# Patient Record
Sex: MALE | Race: WHITE | Employment: FULL TIME | URBAN - METROPOLITAN AREA
[De-identification: names, ages, dates, MRNs, and addresses within clinical notes are randomized per-mention and may not be internally consistent; named-entity substitution may affect disease eponyms.]

---

## 2017-12-15 ENCOUNTER — HOSPITAL ENCOUNTER (EMERGENCY)
Facility: CLINIC | Age: 52
Discharge: HOME | End: 2017-12-15
Attending: EMERGENCY MEDICINE | Admitting: EMERGENCY MEDICINE
Payer: COMMERCIAL

## 2017-12-15 ENCOUNTER — APPOINTMENT (OUTPATIENT)
Dept: GENERAL RADIOLOGY | Facility: CLINIC | Age: 52
End: 2017-12-15
Attending: EMERGENCY MEDICINE
Payer: COMMERCIAL

## 2017-12-15 VITALS
HEIGHT: 67 IN | SYSTOLIC BLOOD PRESSURE: 123 MMHG | OXYGEN SATURATION: 94 % | WEIGHT: 236 LBS | HEART RATE: 94 BPM | TEMPERATURE: 97.1 F | RESPIRATION RATE: 20 BRPM | BODY MASS INDEX: 37.04 KG/M2 | DIASTOLIC BLOOD PRESSURE: 73 MMHG

## 2017-12-15 DIAGNOSIS — R07.9 CHEST PAIN, UNSPECIFIED TYPE: ICD-10-CM

## 2017-12-15 LAB
ANION GAP SERPL CALCULATED.3IONS-SCNC: 7 MMOL/L (ref 3–14)
BASOPHILS # BLD AUTO: 0.1 10E9/L (ref 0–0.2)
BASOPHILS NFR BLD AUTO: 0.6 %
BUN SERPL-MCNC: 19 MG/DL (ref 7–30)
CALCIUM SERPL-MCNC: 8.5 MG/DL (ref 8.5–10.1)
CHLORIDE SERPL-SCNC: 105 MMOL/L (ref 94–109)
CO2 SERPL-SCNC: 27 MMOL/L (ref 20–32)
CREAT SERPL-MCNC: 1.92 MG/DL (ref 0.66–1.25)
DIFFERENTIAL METHOD BLD: NORMAL
EOSINOPHIL # BLD AUTO: 0.5 10E9/L (ref 0–0.7)
EOSINOPHIL NFR BLD AUTO: 4.5 %
ERYTHROCYTE [DISTWIDTH] IN BLOOD BY AUTOMATED COUNT: 13.2 % (ref 10–15)
GFR SERPL CREATININE-BSD FRML MDRD: 37 ML/MIN/1.7M2
GLUCOSE SERPL-MCNC: 111 MG/DL (ref 70–99)
HCT VFR BLD AUTO: 44.2 % (ref 40–53)
HGB BLD-MCNC: 14.9 G/DL (ref 13.3–17.7)
IMM GRANULOCYTES # BLD: 0 10E9/L (ref 0–0.4)
IMM GRANULOCYTES NFR BLD: 0.2 %
INTERPRETATION ECG - MUSE: NORMAL
LYMPHOCYTES # BLD AUTO: 2.5 10E9/L (ref 0.8–5.3)
LYMPHOCYTES NFR BLD AUTO: 24.3 %
MCH RBC QN AUTO: 30.8 PG (ref 26.5–33)
MCHC RBC AUTO-ENTMCNC: 33.7 G/DL (ref 31.5–36.5)
MCV RBC AUTO: 92 FL (ref 78–100)
MONOCYTES # BLD AUTO: 1.1 10E9/L (ref 0–1.3)
MONOCYTES NFR BLD AUTO: 10.5 %
NEUTROPHILS # BLD AUTO: 6 10E9/L (ref 1.6–8.3)
NEUTROPHILS NFR BLD AUTO: 59.9 %
PLATELET # BLD AUTO: 250 10E9/L (ref 150–450)
POTASSIUM SERPL-SCNC: 4.2 MMOL/L (ref 3.4–5.3)
RBC # BLD AUTO: 4.83 10E12/L (ref 4.4–5.9)
SODIUM SERPL-SCNC: 139 MMOL/L (ref 133–144)
TROPONIN I SERPL-MCNC: <0.015 UG/L (ref 0–0.04)
WBC # BLD AUTO: 10.1 10E9/L (ref 4–11)

## 2017-12-15 PROCEDURE — 71020 XR CHEST 2 VW: CPT

## 2017-12-15 PROCEDURE — 80048 BASIC METABOLIC PNL TOTAL CA: CPT | Performed by: EMERGENCY MEDICINE

## 2017-12-15 PROCEDURE — 93005 ELECTROCARDIOGRAM TRACING: CPT

## 2017-12-15 PROCEDURE — 84484 ASSAY OF TROPONIN QUANT: CPT | Performed by: EMERGENCY MEDICINE

## 2017-12-15 PROCEDURE — 85025 COMPLETE CBC W/AUTO DIFF WBC: CPT | Performed by: EMERGENCY MEDICINE

## 2017-12-15 PROCEDURE — 99285 EMERGENCY DEPT VISIT HI MDM: CPT | Mod: 25

## 2017-12-15 ASSESSMENT — ENCOUNTER SYMPTOMS
VOMITING: 0
NAUSEA: 0
COUGH: 0
SHORTNESS OF BREATH: 0
ARTHRALGIAS: 1
DIAPHORESIS: 0
LIGHT-HEADEDNESS: 0

## 2017-12-15 NOTE — ED AVS SNAPSHOT
Emergency Department    [address] [facility] [address]    Phone:  [phone]    Fax:  [phone]                                       Grayson Nam   MRN: [id number]    Department:   Emergency Department   Date of Visit:  12/15/2017           After Visit Summary Signature Page     I have received my discharge instructions, and my questions have been answered. I have discussed any challenges I see with this plan with the nurse or doctor.    ..........................................................................................................................................  Patient/Patient Representative Signature      ..........................................................................................................................................  Patient Representative Print Name and Relationship to Patient    ..................................................               ................................................  Date                                            Time    ..........................................................................................................................................  Reviewed by Signature/Title    ...................................................              ..............................................  Date                                                            Time

## 2017-12-15 NOTE — ED PROVIDER NOTES
History     Chief Complaint:  Chest Pain     HPI   Grayson Nam is a 52 year old male, with a history of pleurisy, hypertension, hyperlipidemia, and family history of cardiac disease, who presents with his wife to the ED for evaluation of left upper chest pain. The patient reports his waxing and waning chest pain began at 8:00PM yesterday while he was sitting to finish his work at home. The patient notes the pain is a dull sensation. The patient rates the pain as a 3/10. The patient reports he has left elbow pain that began a few days ago; his elbow pain now radiates into his hand. The patient denies any diaphoresis, lightheadedness, cough, shortness of breath, nausea, vomiting, or leg swelling.     CARDIAC RISK FACTORS:  Sex:    Male  Tobacco:   No  Hypertension:   Yes  Hyperlipidemia:  Yes  Diabetes:   No  Family History:  Yes    PE/DVT RISK FACTORS:  Sex:    Male  Hormones:   No  Tobacco:   No  Cancer:   No  Travel:   No  Surgery:   No  Other immobilization: No  Personal history:  No  Family history:  No    Allergies:  Atorvastatin      Medications:    PRAVASTATIN SODIUM PO   lisinopril (PRINIVIL,ZESTRIL) 5 MG tablet   Multiple Vitamin (MULTI-VITAMIN) per tablet   Omega-3 Fatty Acids (FISH OIL EXTRA STRENGTH PO)   Cholecalciferol (VITAMIN D PO)      Past Medical History:    Pleurisy   HTN  HLD    Past Surgical History:    Appendectomy     Family History:    MI    Social History:  Smoking status: Never smoker  Alcohol use: Occasional   Presents to ED with wife   Marital Status:   [2]     Review of Systems   Constitutional: Negative for diaphoresis.   Respiratory: Negative for cough and shortness of breath.    Cardiovascular: Positive for chest pain. Negative for leg swelling.   Gastrointestinal: Negative for nausea and vomiting.   Musculoskeletal: Positive for arthralgias.   Neurological: Negative for light-headedness.   All other systems reviewed and are negative.    Physical Exam     Patient  "Vitals for the past 24 hrs:   BP Temp Temp src Pulse Heart Rate Resp SpO2 Height Weight   12/15/17 0045 131/71 - - - 94 20 95 % - -   12/15/17 0026 127/78 97.1  F (36.2  C) Temporal 94 - 16 96 % 1.702 m (5' 7\") 107 kg (236 lb)     Physical Exam  Eye:  Pupils are equal, round, and reactive.  Extraocular movements intact.    ENT:  No rhinorrhea.  Moist mucus membranes.  Normal tongue and tonsil.    Cardiac:  Regular rate and rhythm.  No murmurs, gallops, or rubs.    Pulmonary:  Clear to auscultation bilaterally.  No wheezes, rales, or rhonchi.    Abdomen:  Positive bowel sounds.  Abdomen is soft and non-distended, without focal tenderness.    Musculoskeletal: No lower extremity edema or asymmetry. Normal movement of all extremities without evidence for deficit.    Skin:  Warm and dry without rashes.    Neurologic:  Non-focal exam without asymmetric weakness or numbness.     Psychiatric:  Normal affect with appropriate interaction with examiner.    Emergency Department Course     ECG (0:24:54):  Rate 95 bpm. AR interval 178. QRS duration 72. QT/QTc 334/419. P-R-T axes 54 16 55. Normal sinus rhythm. Low voltage QRS. Cannot rule out anterior infarct, age undetermined. Abnormal ECG. Interpreted at 0030 by Trierweiler, Chad A, MD.    Imaging:  Radiographic findings were communicated with the patient and family who voiced understanding of the findings.    XR Chest 2 Views  IMPRESSION: No infiltrates or other acute findings. Normal-sized  cardiac silhouette. No change. As ready by Radiology.    Laboratory:  Troponin I: <0.015  CBC: o/w WNL (WBC 10.1, HGB 14.9, )  BMP: Glucose 111(H), GFR estimate 37(L), Creatinine 1.92(H)      Emergency Department Course:  Past medical records, nursing notes, and vitals reviewed.  0037: I performed an exam of the patient and obtained history, as documented above.  IV inserted and blood drawn.    The patient was sent for a chest x-ray while in the emergency department, findings " above.    0155: I rechecked the patient. Findings and plan explained to the Patient and spouse. Patient discharged home with instructions regarding supportive care, medications, and reasons to return. The importance of close follow-up was reviewed.     Impression & Plan      Medical Decision Making:  This delightful 52-year-old gentleman with limited cardiac risk factors presents to us with foreign half hours of left sided upper chest wall discomfort.  I am able to reproduce it on exam.  He has no pulmonary embolism risk factors, and without associated pleuritic pain, shortness of breath, tachycardia, or hypoxia, I do not feel that he would require further workup for a pulmonary embolism.  Chest x-ray is clear.  EKG is normal.  Troponin is negative after 5 hours of symptoms.  With this, I feel he has essentially been ruled out for acute coronary syndrome.  He describes working out regularly, doing intense cardiovascular exercise and has not been having increasing discomfort or shortness of breath.  He has a low HEART score and would not require stress testing at this time.  The patient notes his pain is completely resolved during my final reassessment.  At this juncture, I feel he is safe for discharge home with outpatient follow-up and return cautioned for return of symptoms or other emergent concerns.     Diagnosis:    ICD-10-CM   1. Chest pain, unspecified type R07.9     Disposition: Patient discharged to home with wife     Jenny Mcallister  12/15/2017    EMERGENCY DEPARTMENT    I, Jennyharika Mcallister, am serving as a scribe at 12:37 AM on 12/15/2017 to document services personally performed by Trierweiler, Chad A, MD based on my observations and the provider's statements to me.        Trierweiler, Chad A, MD  12/15/17 0437

## 2017-12-15 NOTE — ED AVS SNAPSHOT
Emergency Department    [address] [facility] [address]    Phone:  [phone]    Fax:  [phone]                                       Grayson Nam   MRN: [id number]    Department:   Emergency Department   Date of Visit:  12/15/2017           Patient Information     Date Of Birth          1965        Your diagnoses for this visit were:     Chest pain, unspecified type        You were seen by Trierweiler, Chad A, MD.      Follow-up Information     Follow up with Aayush Bojorquez MD In 3 days.    Specialty:  Family Practice    Contact information:    [facility]  [address]  [place] [address] [phone]          Follow up with  Emergency Department.    Specialty:  EMERGENCY MEDICINE    Why:  If symptoms worsen    Contact information:    [address] [facility] [id number] [phone]        Discharge Instructions       Discharge Instructions  Chest Pain    You have been seen today for chest pain or discomfort.  At this time, your provider has found no signs that your chest pain is due to a serious or life-threatening condition, (or you have declined more testing and/or admission to the hospital). However, sometimes there is a serious problem that does not show up right away. Your evaluation today may not be complete and you may need further testing and evaluation.     Generally, every Emergency Department visit should have a follow-up clinic visit with either a primary or a specialty clinic/provider. Please follow-up as instructed by your emergency provider today.  Return to the Emergency Department if:    Your chest pain changes, gets worse, starts to happen more often, or comes with less activity.    You are newly short of breath.    You get very weak or tired.    You pass out or faint.    You have any new symptoms, like fever, cough, numb legs, or you cough up blood.    You have anything else that worries you.    Until you follow-up  with your regular provider, please do the following:    Take one aspirin daily unless you have an allergy or are told not to by your provider.    If a stress test appointment has been made, go to the appointment.    If you have questions, contact your regular provider.    Follow-up with your regular provider/clinic as directed; this is very important.    If you were given a prescription for medicine here today, be sure to read all of the information (including the package insert) that comes with your prescription.  This will include important information about the medicine, its side effects, and any warnings that you need to know about.  The pharmacist who fills the prescription can provide more information and answer questions you may have about the medicine.  If you have questions or concerns that the pharmacist cannot address, please call or return to the Emergency Department.       Remember that you can always come back to the Emergency Department if you are not able to see your regular provider in the amount of time listed above, if you get any new symptoms, or if there is anything that worries you.      24 Hour Appointment Hotline       To make an appointment at any [facility], call [phone] ([phone]). If you don't have a family doctor or clinic, we will help you find one. [facility] clinics are conveniently located to serve the needs of you and your family.             Review of your medicines      Our records show that you are taking the medicines listed below. If these are incorrect, please call your family doctor or clinic.        Dose / Directions Last dose taken    FISH OIL EXTRA STRENGTH PO        Take  by mouth daily.   Refills:  0        HYDROcodone-acetaminophen 7.5-500 MG/15ML solution   Commonly known as:  LORTAB   Dose:  10 mL   Quantity:  200 mL        Take 10 mLs by mouth 4 times daily as needed for pain.   Refills:  0        lisinopril 5 MG tablet   Commonly known as:   PRINIVIL/ZESTRIL   Dose:  5 mg        Take 5 mg by mouth daily.   Refills:  0        Multi-vitamin Tabs tablet   Dose:  1 tablet   Generic drug:  multivitamin, therapeutic with minerals        Take 1 tablet by mouth daily.   Refills:  0        PRAVASTATIN SODIUM PO   Dose:  20 mg        Take 20 mg by mouth.   Refills:  0        VITAMIN D PO        Take  by mouth daily.   Refills:  0                Procedures and tests performed during your visit     Basic metabolic panel    CBC with platelets differential    EKG 12 lead    Peripheral IV: Standard    Troponin I    XR Chest 2 Views      Orders Needing Specimen Collection     None      Pending Results     No orders found from 12/13/2017 to 12/16/2017.            Pending Culture Results     No orders found from 12/13/2017 to 12/16/2017.            Pending Results Instructions     If you had any lab results that were not finalized at the time of your Discharge, you can call the ED Lab Result RN at [phone]. You will be contacted by this team for any positive Lab results or changes in treatment. The nurses are available 7 days a week from 10A to 6:30P.  You can leave a message 24 hours per day and they will return your call.        Test Results From Your Hospital Stay        12/15/2017 12:54 AM      Component Results     Component Value Ref Range & Units Status    WBC 10.1 4.0 - 11.0 10e9/L Final    RBC Count 4.83 4.4 - 5.9 10e12/L Final    Hemoglobin 14.9 13.3 - 17.7 g/dL Final    Hematocrit 44.2 40.0 - 53.0 % Final    MCV 92 78 - 100 fl Final    MCH 30.8 26.5 - 33.0 pg Final    MCHC 33.7 31.5 - 36.5 g/dL Final    RDW 13.2 10.0 - 15.0 % Final    Platelet Count 250 150 - 450 10e9/L Final    Diff Method Automated Method  Final    % Neutrophils 59.9 % Final    % Lymphocytes 24.3 % Final    % Monocytes 10.5 % Final    % Eosinophils 4.5 % Final    % Basophils 0.6 % Final    % Immature Granulocytes 0.2 % Final    Absolute Neutrophil 6.0 1.6 - 8.3 10e9/L Final    Absolute  Lymphocytes 2.5 0.8 - 5.3 10e9/L Final    Absolute Monocytes 1.1 0.0 - 1.3 10e9/L Final    Absolute Eosinophils 0.5 0.0 - 0.7 10e9/L Final    Absolute Basophils 0.1 0.0 - 0.2 10e9/L Final    Abs Immature Granulocytes 0.0 0 - 0.4 10e9/L Final         12/15/2017  1:11 AM      Component Results     Component Value Ref Range & Units Status    Sodium 139 133 - 144 mmol/L Final    Potassium 4.2 3.4 - 5.3 mmol/L Final    Chloride 105 94 - 109 mmol/L Final    Carbon Dioxide 27 20 - 32 mmol/L Final    Anion Gap 7 3 - 14 mmol/L Final    Glucose 111 (H) 70 - 99 mg/dL Final    Urea Nitrogen 19 7 - 30 mg/dL Final    Creatinine 1.92 (H) 0.66 - 1.25 mg/dL Final    GFR Estimate 37 (L) >60 mL/min/1.7m2 Final    Non  GFR Calc    GFR Estimate If Black 44 (L) >60 mL/min/1.7m2 Final    African American GFR Calc    Calcium 8.5 8.5 - 10.1 mg/dL Final         12/15/2017  1:15 AM      Component Results     Component Value Ref Range & Units Status    Troponin I ES <0.015 0.000 - 0.045 ug/L Final    The 99th percentile for upper reference range is 0.045 ug/L.  Troponin values   in the range of 0.045 - 0.120 ug/L may be associated with risks of adverse   clinical events.           12/15/2017  1:05 AM      Narrative     XR CHEST 2 VW   12/15/2017 12:55 AM     INDICATION: Chest pain.    COMPARISON: 5/23/2013.        Impression     IMPRESSION: No infiltrates or other acute findings. Normal-sized  cardiac silhouette. No change.     ERIC KEYS MD                Clinical Quality Measure: Blood Pressure Screening     Your blood pressure was checked while you were in the emergency department today. The last reading we obtained was  BP: 131/71 . Please read the guidelines below about what these numbers mean and what you should do about them.  If your systolic blood pressure (the top number) is less than 120 and your diastolic blood pressure (the bottom number) is less than 80, then your blood pressure is normal. There is nothing  "more that you need to do about it.  If your systolic blood pressure (the top number) is 120-139 or your diastolic blood pressure (the bottom number) is 80-89, your blood pressure may be higher than it should be. You should have your blood pressure rechecked within a year by a primary care provider.  If your systolic blood pressure (the top number) is 140 or greater or your diastolic blood pressure (the bottom number) is 90 or greater, you may have high blood pressure. High blood pressure is treatable, but if left untreated over time it can put you at risk for heart attack, stroke, or kidney failure. You should have your blood pressure rechecked by a primary care provider within the next 4 weeks.  If your provider in the emergency department today gave you specific instructions to follow-up with your doctor or provider even sooner than that, you should follow that instruction and not wait for up to 4 weeks for your follow-up visit.        Thank you for choosing [facility]       Thank you for choosing [facility] for your care. Our goal is always to provide you with excellent care. Hearing back from our patients is one way we can continue to improve our services. Please take a few minutes to complete the written survey that you may receive in the mail after you visit with us. Thank you!        [employer] Information     [employer] lets you send messages to your doctor, view your test results, renew your prescriptions, schedule appointments and more. To sign up, go to [email or web address] . Click on \"Log in\" on the left side of the screen, which will take you to the Welcome page. Then click on \"Sign up Now\" on the right side of the page.     You will be asked to enter the access code listed below, as well as some personal information. Please follow the directions to create your username and password.     Your access code is: [email or web address]  Expires: 3/15/2018  2:12 AM     Your access code will  in 90 days. If you need help " or a new code, please call your [place] clinic or [phone].        Care EveryWhere ID     This is your Care EveryWhere ID. This could be used by other organizations to access your [place] medical records  [id number]        Equal Access to Services     FLORECITA MCBRIDE : Kyler Reyez, david toledo, drake gamez, ady kingston. So [facility] [phone].    ATENCIÓN: Si habla español, tiene a pugh disposición servicios gratuitos de asistencia lingüística. Llame al [phone].    We comply with applicable federal civil rights laws and [place] laws. We do not discriminate on the basis of race, color, national origin, age, disability, sex, sexual orientation, or gender identity.            After Visit Summary       This is your record. Keep this with you and show to your community pharmacist(s) and doctor(s) at your next visit.

## 2018-01-18 ENCOUNTER — TRANSFERRED RECORDS (OUTPATIENT)
Dept: HEALTH INFORMATION MANAGEMENT | Facility: CLINIC | Age: 53
End: 2018-01-18

## 2018-01-29 ENCOUNTER — OFFICE VISIT (OUTPATIENT)
Dept: SURGERY | Facility: CLINIC | Age: 53
End: 2018-01-29
Payer: COMMERCIAL

## 2018-01-29 VITALS
OXYGEN SATURATION: 96 % | HEART RATE: 78 BPM | SYSTOLIC BLOOD PRESSURE: 118 MMHG | TEMPERATURE: 97.8 F | DIASTOLIC BLOOD PRESSURE: 78 MMHG

## 2018-01-29 DIAGNOSIS — K40.90 LEFT INGUINAL HERNIA: Primary | ICD-10-CM

## 2018-01-29 PROCEDURE — 99244 OFF/OP CNSLTJ NEW/EST MOD 40: CPT | Performed by: SURGERY

## 2018-01-29 NOTE — MR AVS SNAPSHOT
"              After Visit Summary   2018    Grayson Nam    MRN: [id number]           Patient Information     Date Of Birth          1965        Visit Information        Provider Department      2018 12:30 PM Tahir Sena MD [facility] General Surgery       Follow-ups after your visit        Who to contact     If you have questions or need follow up information about today's clinic visit or your schedule please contact [facility] directly at [phone].  Normal or non-critical lab and imaging results will be communicated to you by [employer], letter or phone within 4 business days after the clinic has received the results. If you do not hear from us within 7 days, please contact the clinic through [employer] or phone. If you have a critical or abnormal lab result, we will notify you by phone as soon as possible.  Submit refill requests through [employer] or call your pharmacy and they will forward the refill request to us. Please allow 3 business days for your refill to be completed.          Additional Information About Your Visit        MyChart Information     [employer] lets you send messages to your doctor, view your test results, renew your prescriptions, schedule appointments and more. To sign up, go to [email or web address] . Click on \"Log in\" on the left side of the screen, which will take you to the Welcome page. Then click on \"Sign up Now\" on the right side of the page.     You will be asked to enter the access code listed below, as well as some personal information. Please follow the directions to create your username and password.     Your access code is: [email or web address]  Expires: 3/15/2018  2:12 AM     Your access code will  in 90 days. If you need help or a new code, please call your [place] clinic or [phone].        Care EveryWhere ID     This is your Care EveryWhere ID. This could be used by other organizations " to access your [place] medical records  [id number]        Your Vitals Were     Pulse Temperature Pulse Oximetry             78 97.8  F (36.6  C) 96%          Blood Pressure from Last 3 Encounters:   01/29/18 118/78   12/15/17 123/73   05/23/13 138/85    Weight from Last 3 Encounters:   12/15/17 236 lb (107 kg)   05/23/13 218 lb (98.9 kg)   09/22/12 229 lb 14.4 oz (104.3 kg)              Today, you had the following     No orders found for display       Primary Care Provider Office Phone # Fax #    Sadaf Kramer [phone]       [facility] [address]  [facility] [address]        Equal Access to Services     FLORECITA MCBRIDE : Hadii colin motao Soomaali, waaxda luqadaha, qaybta kaalmada adeegyada, ady cardoso . So [facility] [phone].    ATENCIÓN: Si habla español, tiene a pugh disposición servicios gratuitos de asistencia lingüística. Llame al [phone].    We comply with applicable federal civil rights laws and [place] laws. We do not discriminate on the basis of race, color, national origin, age, disability, sex, sexual orientation, or gender identity.            Thank you!     Thank you for choosing [facility]  for your care. Our goal is always to provide you with excellent care. Hearing back from our patients is one way we can continue to improve our services. Please take a few minutes to complete the written survey that you may receive in the mail after your visit with us. Thank you!             Your Updated Medication List - Protect others around you: Learn how to safely use, store and throw away your medicines at www.disposemymeds.org.          This list is accurate as of 1/29/18  1:08 PM.  Always use your most recent med list.                   Brand Name Dispense Instructions for use Diagnosis    BUPROPION HCL PO      Take 150 mg by mouth 2 times daily        FISH OIL EXTRA STRENGTH PO      Take  by mouth daily.         fluticasone-salmeterol 100-50 MCG/DOSE diskus inhaler    ADVAIR     Inhale 1 puff into the lungs 2 times daily        HYDROcodone-acetaminophen 7.5-500 MG/15ML solution    LORTAB    200 mL    Take 10 mLs by mouth 4 times daily as needed for pain.        * lisinopril 5 MG tablet    PRINIVIL/ZESTRIL     Take 5 mg by mouth daily.        * LISINOPRIL PO      Take 10 mg by mouth        Multi-vitamin Tabs tablet   Generic drug:  multivitamin, therapeutic with minerals      Take 1 tablet by mouth daily.        PRAVASTATIN SODIUM PO      Take 20 mg by mouth.        VENTOLIN HFA IN      Inhale 90 mcg into the lungs        VITAMIN D PO      Take  by mouth daily.        * Notice:  This list has 2 medication(s) that are the same as other medications prescribed for you. Read the directions carefully, and ask your doctor or other care provider to review them with you.

## 2018-01-29 NOTE — PROGRESS NOTES
Pain Location: groin    Pain type: dull    Bulge: Yes    Bulge reducible: Yes    Symptoms: None    Time Frame of Hernia: 6 month(s) ago    Ijeoma Hunter RN

## 2018-01-29 NOTE — LETTER
2018    Re: Grayson Nam, : 1965    HISTORY OF PRESENT ILLNESS:  Grayson Nam is a 52 year old male who is seen in consultation at the request of Dr. Kramer for evaluation of newly developed left inguinal hernia.  For some time now Mr. Nam has had some discomfort in his left groin but the recent did not became clear until recently.  His pain continued and he developed a bulge in the left groin.  His gastrointestinal function has remained normal.  He has been putting some effort of weight loss and has lost about 5 pounds recently.     REVIEW OF SYSTEMS:  Constitutional:  Negative for chills, fatigue, fever and weight change.  Eyes:  Negative for new vision problems.  ENT:  Negative for ENT pain.  Cardiovascular:  Negative for chest pain, palpitations.  Respiratory:  Negative for cough, dyspnea.  Gastrointestinal:  Negative for gastrointestinal disturbances.  Positive for left groin pain  Musculoskeletal:  Negative for new arthralgias or myalgias.  Integumentary: No new rashes nor masses.     Family History has been reviewed.     Vitals: /78 (BP Location: Right leg, Patient Position: Sitting, Cuff Size: Adult Regular)  Pulse 78  Temp 97.8  F (36.6  C)  SpO2 96%      EXAM:  GENERAL: Obese, alert and no distress   HEENT: moist mucus membranes, no scleral icterus  CARDIOVASCULAR:  RRR, No JVD  RESPIRATORY: non labored breathing  NECK: Neck supple. No noticeable masses.  ABDOMEN: soft, tender to palpation left groin with partially reducible bulge.  Right groin feels lax but no definitive hernias noted.  Extremities: warm and well perfused, no edema  SKIN: No suspicious lesions or rashes  LYMPH: Normal inguinal lymph nodes     LABS/Imaging: None at the moment     ASSESSMENT:  Grayson Nam suffers from partially reducible left inguinal hernia.     PLAN:  Laparoscopic left inguinal hernia repair.  He plans on losing some weight prior to proceeding to his operation, this  was highly encouraged.  Grayson Nam understands the risk, benefits, hopeful outcomes, and possible complications, both in the short and in the long term.  All his questions answered, he will like to proceed with the propose procedure in the near future.     It is my pleasure to participate in the care of Grayson Nam. Thank you for this consultation.      If you have any questions please give me a call.     Best regards,  Tahir Sena MD

## 2018-01-30 NOTE — PROGRESS NOTES
[facility] Consultation    CHIEF COMPLAINT:  Chief Complaint   Patient presents with     Consult     inguinal hernia left side       HISTORY OF PRESENT ILLNESS:  Grayson Nam is a 52 year old male who is seen in consultation at the request of Dr. Kramer for evaluation of newly developed left inguinal hernia.  For some time now Mr. Nam has had some discomfort in his left groin but the recent did not became clear until recently.  His pain continued and he developed a bulge in the left groin.  His gastrointestinal function has remained normal.  He has been putting some effort of weight loss and has lost about 5 pounds recently.    REVIEW OF SYSTEMS:  Constitutional:  Negative for chills, fatigue, fever and weight change.  Eyes:  Negative for new vision problems.  ENT:  Negative for ENT pain.  Cardiovascular:  Negative for chest pain, palpitations.  Respiratory:  Negative for cough, dyspnea.  Gastrointestinal:  Negative for gastrointestinal disturbances.  Positive for left groin pain  Musculoskeletal:  Negative for new arthralgias or myalgias.  Integumentary: No new rashes nor masses.    Past Medical History:   Diagnosis Date     Essential hypertension, benign     Hypertension, Benign     High cholesterol      History of blood transfusion      Kidney disease        Past Surgical History:   Procedure Laterality Date     APPENDECTOMY OPEN  1971       Family History has been reviewed.    Social History   Substance Use Topics     Smoking status: Never Smoker     Smokeless tobacco: Never Used     Alcohol use 0.6 - 1.2 oz/week     1 - 2 Cans of beer per week      Comment: occas.       Patient Active Problem List   Diagnosis     URI (upper respiratory infection)       Allergies   Allergen Reactions     Animal Dander      Atorvastatin      Novocain [Procaine]        Current Outpatient Prescriptions   Medication Sig Dispense Refill     BUPROPION HCL PO Take 150 mg by mouth 2 times daily        fluticasone-salmeterol (ADVAIR) 100-50 MCG/DOSE diskus inhaler Inhale 1 puff into the lungs 2 times daily       LISINOPRIL PO Take 10 mg by mouth       Albuterol Sulfate (VENTOLIN HFA IN) Inhale 90 mcg into the lungs       PRAVASTATIN SODIUM PO Take 20 mg by mouth.       HYDROcodone-acetaminophen (LORTAB) 7.5-500 MG/15ML solution Take 10 mLs by mouth 4 times daily as needed for pain. (Patient not taking: Reported on 1/29/2018) 200 mL 0     lisinopril (PRINIVIL,ZESTRIL) 5 MG tablet Take 5 mg by mouth daily.       Multiple Vitamin (MULTI-VITAMIN) per tablet Take 1 tablet by mouth daily.       Omega-3 Fatty Acids (FISH OIL EXTRA STRENGTH PO) Take  by mouth daily.       Cholecalciferol (VITAMIN D PO) Take  by mouth daily.         Vitals: /78 (BP Location: Right leg, Patient Position: Sitting, Cuff Size: Adult Regular)  Pulse 78  Temp 97.8  F (36.6  C)  SpO2 96%     EXAM:  GENERAL: Obese, alert and no distress   HEENT: moist mucus membranes, no scleral icterus  CARDIOVASCULAR:  RRR, No JVD  RESPIRATORY: non labored breathing  NECK: Neck supple. No noticeable masses.  ABDOMEN: soft, tender to palpation left groin with partially reducible bulge.  Right groin feels lax but no definitive hernias noted.  Extremities: warm and well perfused, no edema  SKIN: No suspicious lesions or rashes  LYMPH: Normal inguinal lymph nodes    LABS/Imaging: None at the moment    ASSESSMENT:  Grayson Nam suffers from partially reducible left inguinal hernia.    PLAN:  Laparoscopic left inguinal hernia repair.  He plans on losing some weight prior to proceeding to his operation, this was highly encouraged.  Grayson Nam understands the risk, benefits, hopeful outcomes, and possible complications, both in the short and in the long term.  All his questions answered, he will like to proceed with the propose procedure in the near future.    It is my pleasure to participate in the care of Grayson Nam. Thank you for  this consultation.     If you have any questions please give me a call.    Best regards,  Tahir Sena MD    Please route or send letter to:  Primary Care Provider (PCP), Referring Provider and Include Progress Note    Total time with patient visit: 30 minutes more than half spent in counseling, explanation of procedures and coordination of care.

## 2018-02-15 ENCOUNTER — ANESTHESIA EVENT (OUTPATIENT)
Dept: SURGERY | Facility: CLINIC | Age: 53
End: 2018-02-15
Payer: COMMERCIAL

## 2018-02-15 ENCOUNTER — APPOINTMENT (OUTPATIENT)
Dept: SURGERY | Facility: PHYSICIAN GROUP | Age: 53
End: 2018-02-15
Payer: COMMERCIAL

## 2018-02-15 ENCOUNTER — ANESTHESIA (OUTPATIENT)
Dept: SURGERY | Facility: CLINIC | Age: 53
End: 2018-02-15
Payer: COMMERCIAL

## 2018-02-15 ENCOUNTER — HOSPITAL ENCOUNTER (OUTPATIENT)
Facility: CLINIC | Age: 53
Discharge: HOME | End: 2018-02-15
Attending: SURGERY | Admitting: SURGERY
Payer: COMMERCIAL

## 2018-02-15 ENCOUNTER — SURGERY (OUTPATIENT)
Age: 53
End: 2018-02-15

## 2018-02-15 VITALS
RESPIRATION RATE: 16 BRPM | BODY MASS INDEX: 35 KG/M2 | WEIGHT: 223 LBS | SYSTOLIC BLOOD PRESSURE: 106 MMHG | DIASTOLIC BLOOD PRESSURE: 71 MMHG | HEIGHT: 67 IN | TEMPERATURE: 97.5 F | OXYGEN SATURATION: 95 %

## 2018-02-15 DIAGNOSIS — G89.18 ACUTE POST-OPERATIVE PAIN: ICD-10-CM

## 2018-02-15 DIAGNOSIS — K40.90 LEFT INGUINAL HERNIA: Primary | ICD-10-CM

## 2018-02-15 PROCEDURE — 27210794 ZZH OR GENERAL SUPPLY STERILE: Performed by: SURGERY

## 2018-02-15 PROCEDURE — 37000009 ZZH ANESTHESIA TECHNICAL FEE, EACH ADDTL 15 MIN: Performed by: SURGERY

## 2018-02-15 PROCEDURE — 49650 LAP ING HERNIA REPAIR INIT: CPT | Mod: LT | Performed by: SURGERY

## 2018-02-15 PROCEDURE — 36000056 ZZH SURGERY LEVEL 3 1ST 30 MIN: Performed by: SURGERY

## 2018-02-15 PROCEDURE — 71000013 ZZH RECOVERY PHASE 1 LEVEL 1 EA ADDTL HR: Performed by: SURGERY

## 2018-02-15 PROCEDURE — 25000125 ZZHC RX 250: Performed by: NURSE ANESTHETIST, CERTIFIED REGISTERED

## 2018-02-15 PROCEDURE — C1727 CATH, BAL TIS DIS, NON-VAS: HCPCS | Performed by: SURGERY

## 2018-02-15 PROCEDURE — 25000132 ZZH RX MED GY IP 250 OP 250 PS 637: Performed by: PHYSICIAN ASSISTANT

## 2018-02-15 PROCEDURE — 71000012 ZZH RECOVERY PHASE 1 LEVEL 1 FIRST HR: Performed by: SURGERY

## 2018-02-15 PROCEDURE — 71000027 ZZH RECOVERY PHASE 2 EACH 15 MINS: Performed by: SURGERY

## 2018-02-15 PROCEDURE — 25000125 ZZHC RX 250: Performed by: SURGERY

## 2018-02-15 PROCEDURE — 27210995 ZZH RX 272: Performed by: SURGERY

## 2018-02-15 PROCEDURE — 40000170 ZZH STATISTIC PRE-PROCEDURE ASSESSMENT II: Performed by: SURGERY

## 2018-02-15 PROCEDURE — 25000128 H RX IP 250 OP 636: Performed by: NURSE ANESTHETIST, CERTIFIED REGISTERED

## 2018-02-15 PROCEDURE — 25000128 H RX IP 250 OP 636: Performed by: SURGERY

## 2018-02-15 PROCEDURE — 49650 LAP ING HERNIA REPAIR INIT: CPT | Mod: AS | Performed by: PHYSICIAN ASSISTANT

## 2018-02-15 PROCEDURE — 37000008 ZZH ANESTHESIA TECHNICAL FEE, 1ST 30 MIN: Performed by: SURGERY

## 2018-02-15 PROCEDURE — C1781 MESH (IMPLANTABLE): HCPCS | Performed by: SURGERY

## 2018-02-15 PROCEDURE — 36000058 ZZH SURGERY LEVEL 3 EA 15 ADDTL MIN: Performed by: SURGERY

## 2018-02-15 PROCEDURE — 25000128 H RX IP 250 OP 636: Performed by: ANESTHESIOLOGY

## 2018-02-15 DEVICE — MESH PROGRIP LAPAROSCOPIC 5.9X3.9" PARIETEX SELF-FIX LPG1510: Type: IMPLANTABLE DEVICE | Site: GROIN | Status: FUNCTIONAL

## 2018-02-15 RX ORDER — EPHEDRINE SULFATE 50 MG/ML
INJECTION, SOLUTION INTRAMUSCULAR; INTRAVENOUS; SUBCUTANEOUS PRN
Status: DISCONTINUED | OUTPATIENT
Start: 2018-02-15 | End: 2018-02-15

## 2018-02-15 RX ORDER — SODIUM CHLORIDE, SODIUM LACTATE, POTASSIUM CHLORIDE, CALCIUM CHLORIDE 600; 310; 30; 20 MG/100ML; MG/100ML; MG/100ML; MG/100ML
INJECTION, SOLUTION INTRAVENOUS CONTINUOUS PRN
Status: DISCONTINUED | OUTPATIENT
Start: 2018-02-15 | End: 2018-02-15

## 2018-02-15 RX ORDER — SODIUM CHLORIDE, SODIUM LACTATE, POTASSIUM CHLORIDE, CALCIUM CHLORIDE 600; 310; 30; 20 MG/100ML; MG/100ML; MG/100ML; MG/100ML
INJECTION, SOLUTION INTRAVENOUS CONTINUOUS
Status: DISCONTINUED | OUTPATIENT
Start: 2018-02-15 | End: 2018-02-15 | Stop reason: HOSPADM

## 2018-02-15 RX ORDER — HYDROCODONE BITARTRATE AND ACETAMINOPHEN 7.5; 325 MG/15ML; MG/15ML
10-15 SOLUTION ORAL 4 TIMES DAILY PRN
Qty: 270 ML | Refills: 0 | Status: SHIPPED | OUTPATIENT
Start: 2018-02-15 | End: 2018-12-28

## 2018-02-15 RX ORDER — MEPERIDINE HYDROCHLORIDE 25 MG/ML
12.5 INJECTION INTRAMUSCULAR; INTRAVENOUS; SUBCUTANEOUS
Status: DISCONTINUED | OUTPATIENT
Start: 2018-02-15 | End: 2018-02-15 | Stop reason: HOSPADM

## 2018-02-15 RX ORDER — MAGNESIUM HYDROXIDE 1200 MG/15ML
LIQUID ORAL PRN
Status: DISCONTINUED | OUTPATIENT
Start: 2018-02-15 | End: 2018-02-15 | Stop reason: HOSPADM

## 2018-02-15 RX ORDER — NALOXONE HYDROCHLORIDE 0.4 MG/ML
.1-.4 INJECTION, SOLUTION INTRAMUSCULAR; INTRAVENOUS; SUBCUTANEOUS
Status: DISCONTINUED | OUTPATIENT
Start: 2018-02-15 | End: 2018-02-15 | Stop reason: HOSPADM

## 2018-02-15 RX ORDER — GLYCOPYRROLATE 0.2 MG/ML
INJECTION, SOLUTION INTRAMUSCULAR; INTRAVENOUS PRN
Status: DISCONTINUED | OUTPATIENT
Start: 2018-02-15 | End: 2018-02-15

## 2018-02-15 RX ORDER — ONDANSETRON 2 MG/ML
4 INJECTION INTRAMUSCULAR; INTRAVENOUS EVERY 30 MIN PRN
Status: DISCONTINUED | OUTPATIENT
Start: 2018-02-15 | End: 2018-02-15 | Stop reason: HOSPADM

## 2018-02-15 RX ORDER — HYDROCODONE BITARTRATE AND ACETAMINOPHEN 7.5; 325 MG/15ML; MG/15ML
15 SOLUTION ORAL
Status: COMPLETED | OUTPATIENT
Start: 2018-02-15 | End: 2018-02-15

## 2018-02-15 RX ORDER — DEXAMETHASONE SODIUM PHOSPHATE 4 MG/ML
INJECTION, SOLUTION INTRA-ARTICULAR; INTRALESIONAL; INTRAMUSCULAR; INTRAVENOUS; SOFT TISSUE PRN
Status: DISCONTINUED | OUTPATIENT
Start: 2018-02-15 | End: 2018-02-15

## 2018-02-15 RX ORDER — HYDROCODONE BITARTRATE AND ACETAMINOPHEN 7.5; 325 MG/15ML; MG/15ML
10 SOLUTION ORAL
Status: DISCONTINUED | OUTPATIENT
Start: 2018-02-15 | End: 2018-02-15

## 2018-02-15 RX ORDER — ONDANSETRON 4 MG/1
4 TABLET, ORALLY DISINTEGRATING ORAL EVERY 30 MIN PRN
Status: DISCONTINUED | OUTPATIENT
Start: 2018-02-15 | End: 2018-02-15 | Stop reason: HOSPADM

## 2018-02-15 RX ORDER — PHYSOSTIGMINE SALICYLATE 1 MG/ML
1.2 INJECTION INTRAVENOUS
Status: DISCONTINUED | OUTPATIENT
Start: 2018-02-15 | End: 2018-02-15 | Stop reason: HOSPADM

## 2018-02-15 RX ORDER — NEOSTIGMINE METHYLSULFATE 1 MG/ML
VIAL (ML) INJECTION PRN
Status: DISCONTINUED | OUTPATIENT
Start: 2018-02-15 | End: 2018-02-15

## 2018-02-15 RX ORDER — ONDANSETRON 2 MG/ML
INJECTION INTRAMUSCULAR; INTRAVENOUS PRN
Status: DISCONTINUED | OUTPATIENT
Start: 2018-02-15 | End: 2018-02-15

## 2018-02-15 RX ORDER — FENTANYL CITRATE 50 UG/ML
25-50 INJECTION, SOLUTION INTRAMUSCULAR; INTRAVENOUS
Status: DISCONTINUED | OUTPATIENT
Start: 2018-02-15 | End: 2018-02-15 | Stop reason: HOSPADM

## 2018-02-15 RX ORDER — HYDROMORPHONE HYDROCHLORIDE 1 MG/ML
.3-.5 INJECTION, SOLUTION INTRAMUSCULAR; INTRAVENOUS; SUBCUTANEOUS EVERY 10 MIN PRN
Status: DISCONTINUED | OUTPATIENT
Start: 2018-02-15 | End: 2018-02-15 | Stop reason: HOSPADM

## 2018-02-15 RX ORDER — FENTANYL CITRATE 50 UG/ML
INJECTION, SOLUTION INTRAMUSCULAR; INTRAVENOUS PRN
Status: DISCONTINUED | OUTPATIENT
Start: 2018-02-15 | End: 2018-02-15

## 2018-02-15 RX ORDER — PROPOFOL 10 MG/ML
INJECTION, EMULSION INTRAVENOUS CONTINUOUS PRN
Status: DISCONTINUED | OUTPATIENT
Start: 2018-02-15 | End: 2018-02-15

## 2018-02-15 RX ORDER — CEFAZOLIN SODIUM 1 G
1 VIAL (EA) INJECTION SEE ADMIN INSTRUCTIONS
Status: DISCONTINUED | OUTPATIENT
Start: 2018-02-15 | End: 2018-02-15 | Stop reason: HOSPADM

## 2018-02-15 RX ORDER — FENTANYL CITRATE 50 UG/ML
25-50 INJECTION, SOLUTION INTRAMUSCULAR; INTRAVENOUS EVERY 5 MIN PRN
Status: DISCONTINUED | OUTPATIENT
Start: 2018-02-15 | End: 2018-02-15 | Stop reason: HOSPADM

## 2018-02-15 RX ORDER — LIDOCAINE HYDROCHLORIDE 10 MG/ML
INJECTION, SOLUTION EPIDURAL; INFILTRATION; INTRACAUDAL; PERINEURAL
Status: DISCONTINUED
Start: 2018-02-15 | End: 2018-02-15 | Stop reason: HOSPADM

## 2018-02-15 RX ORDER — LIDOCAINE HYDROCHLORIDE 20 MG/ML
INJECTION, SOLUTION INFILTRATION; PERINEURAL PRN
Status: DISCONTINUED | OUTPATIENT
Start: 2018-02-15 | End: 2018-02-15

## 2018-02-15 RX ORDER — PROPOFOL 10 MG/ML
INJECTION, EMULSION INTRAVENOUS PRN
Status: DISCONTINUED | OUTPATIENT
Start: 2018-02-15 | End: 2018-02-15

## 2018-02-15 RX ORDER — CEFAZOLIN SODIUM 2 G/100ML
2 INJECTION, SOLUTION INTRAVENOUS
Status: COMPLETED | OUTPATIENT
Start: 2018-02-15 | End: 2018-02-15

## 2018-02-15 RX ADMIN — Medication 5 MG: at 13:32

## 2018-02-15 RX ADMIN — PHENYLEPHRINE HYDROCHLORIDE 200 MCG: 10 INJECTION INTRAVENOUS at 13:30

## 2018-02-15 RX ADMIN — FENTANYL CITRATE 50 MCG: 50 INJECTION, SOLUTION INTRAMUSCULAR; INTRAVENOUS at 15:05

## 2018-02-15 RX ADMIN — SODIUM CHLORIDE 50 ML: 900 IRRIGANT IRRIGATION at 14:09

## 2018-02-15 RX ADMIN — PROPOFOL 200 MCG/KG/MIN: 10 INJECTION, EMULSION INTRAVENOUS at 13:03

## 2018-02-15 RX ADMIN — PHENYLEPHRINE HYDROCHLORIDE 100 MCG: 10 INJECTION INTRAVENOUS at 14:17

## 2018-02-15 RX ADMIN — PHENYLEPHRINE HYDROCHLORIDE 200 MCG: 10 INJECTION INTRAVENOUS at 13:16

## 2018-02-15 RX ADMIN — FENTANYL CITRATE 50 MCG: 50 INJECTION, SOLUTION INTRAMUSCULAR; INTRAVENOUS at 13:03

## 2018-02-15 RX ADMIN — PHENYLEPHRINE HYDROCHLORIDE 200 MCG: 10 INJECTION INTRAVENOUS at 13:37

## 2018-02-15 RX ADMIN — SODIUM CHLORIDE, POTASSIUM CHLORIDE, SODIUM LACTATE AND CALCIUM CHLORIDE: 600; 310; 30; 20 INJECTION, SOLUTION INTRAVENOUS at 13:02

## 2018-02-15 RX ADMIN — PROPOFOL 200 MG: 10 INJECTION, EMULSION INTRAVENOUS at 13:03

## 2018-02-15 RX ADMIN — PHENYLEPHRINE HYDROCHLORIDE 200 MCG: 10 INJECTION INTRAVENOUS at 13:22

## 2018-02-15 RX ADMIN — ROCURONIUM BROMIDE 30 MG: 10 INJECTION INTRAVENOUS at 13:03

## 2018-02-15 RX ADMIN — MIDAZOLAM 2 MG: 1 INJECTION INTRAMUSCULAR; INTRAVENOUS at 13:02

## 2018-02-15 RX ADMIN — NEOSTIGMINE METHYLSULFATE 5 MG: 1 INJECTION INTRAMUSCULAR; INTRAVENOUS; SUBCUTANEOUS at 14:05

## 2018-02-15 RX ADMIN — HYDROCODONE BITARTRATE AND ACETAMINOPHEN 15 ML: 2.5; 108 SOLUTION ORAL at 16:15

## 2018-02-15 RX ADMIN — ONDANSETRON 4 MG: 2 INJECTION INTRAMUSCULAR; INTRAVENOUS at 13:09

## 2018-02-15 RX ADMIN — ROCURONIUM BROMIDE 10 MG: 10 INJECTION INTRAVENOUS at 13:15

## 2018-02-15 RX ADMIN — ROCURONIUM BROMIDE 10 MG: 10 INJECTION INTRAVENOUS at 13:32

## 2018-02-15 RX ADMIN — SODIUM CHLORIDE, POTASSIUM CHLORIDE, SODIUM LACTATE AND CALCIUM CHLORIDE: 600; 310; 30; 20 INJECTION, SOLUTION INTRAVENOUS at 14:04

## 2018-02-15 RX ADMIN — CEFAZOLIN SODIUM 2 G: 2 INJECTION, SOLUTION INTRAVENOUS at 13:06

## 2018-02-15 RX ADMIN — LIDOCAINE HYDROCHLORIDE 60 MG: 20 INJECTION, SOLUTION INFILTRATION; PERINEURAL at 13:03

## 2018-02-15 RX ADMIN — PHENYLEPHRINE HYDROCHLORIDE 100 MCG: 10 INJECTION INTRAVENOUS at 13:11

## 2018-02-15 RX ADMIN — LIDOCAINE HYDROCHLORIDE 35 ML: 10 INJECTION, SOLUTION INFILTRATION; PERINEURAL at 14:19

## 2018-02-15 RX ADMIN — DEXAMETHASONE SODIUM PHOSPHATE 4 MG: 4 INJECTION, SOLUTION INTRA-ARTICULAR; INTRALESIONAL; INTRAMUSCULAR; INTRAVENOUS; SOFT TISSUE at 13:09

## 2018-02-15 RX ADMIN — PHENYLEPHRINE HYDROCHLORIDE 100 MCG: 10 INJECTION INTRAVENOUS at 13:25

## 2018-02-15 RX ADMIN — PHENYLEPHRINE HYDROCHLORIDE 100 MCG: 10 INJECTION INTRAVENOUS at 14:10

## 2018-02-15 RX ADMIN — PHENYLEPHRINE HYDROCHLORIDE 100 MCG: 10 INJECTION INTRAVENOUS at 13:47

## 2018-02-15 RX ADMIN — PHENYLEPHRINE HYDROCHLORIDE 100 MCG: 10 INJECTION INTRAVENOUS at 13:55

## 2018-02-15 RX ADMIN — PHENYLEPHRINE HYDROCHLORIDE 100 MCG: 10 INJECTION INTRAVENOUS at 14:13

## 2018-02-15 RX ADMIN — PROPOFOL 50 MG: 10 INJECTION, EMULSION INTRAVENOUS at 14:12

## 2018-02-15 RX ADMIN — DEXMEDETOMIDINE HYDROCHLORIDE 12 MCG: 100 INJECTION, SOLUTION INTRAVENOUS at 13:09

## 2018-02-15 RX ADMIN — GLYCOPYRROLATE 0.8 MG: 0.2 INJECTION, SOLUTION INTRAMUSCULAR; INTRAVENOUS at 14:05

## 2018-02-15 NOTE — IP AVS SNAPSHOT
[facility]    [address]    [place] [address]    Phone:  [phone]    Fax:  [phone]                                       After Visit Summary   2/15/2018    Grayson Nam    MRN: [id number]           After Visit Summary Signature Page     I have received my discharge instructions, and my questions have been answered. I have discussed any challenges I see with this plan with the nurse or doctor.    ..........................................................................................................................................  Patient/Patient Representative Signature      ..........................................................................................................................................  Patient Representative Print Name and Relationship to Patient    ..................................................               ................................................  Date                                            Time    ..........................................................................................................................................  Reviewed by Signature/Title    ...................................................              ..............................................  Date                                                            Time

## 2018-02-15 NOTE — DISCHARGE INSTRUCTIONS
[facility]  Discharge Instructions: Post-Operative Laparoscopic Inguinal Hernia    ACTIVITY    Take frequent, short walks and increase your activity gradually.      Avoid strenuous physical activity or heavy lifting greater than 15 lbs. for 3-4 weeks.  You may climb stairs.    You may drive without restrictions when you are not using any prescription pain medication and feel comfortable in a car.    You may return to work/school when you are comfortable without any prescription pain medication.    WOUND CARE    You may remove your outer dressing or Band-Aids and shower 48 hours after the surgery.  Pat your incisions dry and leave them open to air.  Re-apply dressing (Band-Aids or gauze/tape) as needed for comfort or drainage.    You may have steri-strips (looks like white tape) on your incision.  You may peel off the steri-strips 2 weeks after your surgery if they have not peeled off on their own.     Do not soak your incisions in a tub or pool for 2 weeks.     Do not apply any lotions, creams, or ointments to your incisions.    A ridge under your incisions is normal and will gradually resolve.    DIET    Start with liquids, then gradually resume your regular diet as tolerated.     Drink plenty of fluids to stay hydrated.    PAIN    Expect some tenderness and discomfort at the incision site(s).  Use the prescribed pain medication at your discretion.  Expect gradual resolution of your pain over several days.    Do not take any additional acetaminophen/APAP/Tylenol until you are weaning off Lortab.    Do not drink alcohol or drive while you are taking pain medications.    You may apply ice to your incisions in 20 minute intervals as needed for the next 48 hours.  After that time, consider switching to heat if you prefer.    EXPECTATIONS    You may notice air in your scrotum and/or penis after the surgery.  This is due to the gas used during the surgery.  You can expect some  swelling and bruising involving the scrotum and/or penis as well as numbness.  These symptoms are expected and should gradually resolve in the next few days.  You may use ice to help with the swelling and try placing a rolled hand towel below your scrotum to help alleviate scrotal discomfort.  If you develop significant testicular or penile pain, please call our office and speak with a nurse.    Pain medications can cause constipation.  Limit use when possible.  Take over the counter stool softener/stimulant, such as Colace or Senna, 1-2 times a day with plenty of water.  You may take a mild over the counter laxative, such as Miralax or a suppository, as needed. You may discontinue these medications once you are having regular bowel movements and/or are no longer taking your narcotic pain medication.     You may have shoulder or upper back discomfort due to the gas used in surgery.  This is temporary and should resolve in 48-72 hours.  Short, frequent walks may help with this.    FOLLOW UP    Our office will contact you approximately 2 weeks to check on your progress and answer any questions you may have.  If you are doing well, you will not need to return for a follow up appointment.  If any concerns are identified over the phone, we will help you make an appointment to see a provider.     If you have not received a phone call, have any questions or concerns, or would like to be seen, please call us at [phone] and ask to speak with our nurse.  We are located at [address] [place] [address].    CALL OUR OFFICE -[phone] IF YOU HAVE:     Chills or fever above 101 F.    Increased redness, warmth, or drainage at your incisions.    Significant bleeding.    Pain not relieved by your pain medication or rest.    Increasing pain after the first 48 hours.    Any other concerns or questions.    Revised January 2018      **If you have questions or concerns about your procedure,  call   [facility] at [phone]**      Same Day Surgery Discharge Instructions for  Sedation and General Anesthesia       It's not unusual to feel dizzy, light-headed or faint for up to 24 hours after surgery or while taking pain medication.  If you have these symptoms: sit for a few minutes before standing and have someone assist you when you get up to walk or use the bathroom.      You should rest and relax for the next 24 hours. We recommend you make arrangements to have an adult stay with you for at least 24 hours after your discharge.  Avoid hazardous and strenuous activity.      DO NOT DRIVE any vehicle or operate mechanical equipment for 24 hours following the end of your surgery.  Even though you may feel normal, your reactions may be affected by the medication you have received.      Do not drink alcoholic beverages for 24 hours following surgery.       Slowly progress to your regular diet as you feel able. It's not unusual to feel nauseated and/or vomit after receiving anesthesia.  If you develop these symptoms, drink clear liquids (apple juice, ginger ale, broth, 7-up, etc. ) until you feel better.  If your nausea and vomiting persists for 24 hours, please notify your surgeon.        All narcotic pain medications, along with inactivity and anesthesia, can cause constipation. Drinking plenty of liquids and increasing fiber intake will help.      For any questions of a medical nature, call your surgeon.      Do not make important decisions for 24 hours.      If you had general anesthesia, you may have a sore throat for a couple of days related to the breathing tube used during surgery.  You may use Cepacol lozenges to help with this discomfort.  If it worsens or if you develop a fever, contact your surgeon.       If you feel your pain is not well managed with the pain medications prescribed by your surgeon, please contact your surgeon's office to let them know so they can address your concerns.

## 2018-02-15 NOTE — ANESTHESIA CARE TRANSFER NOTE
Patient: Grayson Nam    Procedure(s):  LAPAROSCOPIC LEFT INGUINAL HERNIA REPAIR WITH MESH - Wound Class: I-Clean    Diagnosis: LEFT INGUINAL HERNIA  Diagnosis Additional Information: No value filed.    Anesthesia Type:   General, LMA     Note:  Airway :Face Mask  Patient transferred to:PACU  Comments: Extubated awake in OR, exchanging well, to recovery, VSSHandoff Report: Identifed the Patient, Identified the Reponsible Provider, Reviewed the pertinent medical history, Discussed the surgical course, Reviewed Intra-OP anesthesia mangement and issues during anesthesia, Set expectations for post-procedure period and Allowed opportunity for questions and acknowledgement of understanding      Vitals: (Last set prior to Anesthesia Care Transfer)    CRNA VITALS  2/15/2018 1403 - 2/15/2018 1437      2/15/2018             Pulse: 86    SpO2: 92 %    Resp Rate (observed): 9                Electronically Signed By: LENIN Zambrano CRNA  February 15, 2018  2:37 PM

## 2018-02-15 NOTE — OP NOTE
Surgeon: Tahir Sena MD  1st Assistant: Horace Thorpe PA-C,The physicians assistant was medically necessary for their expertise in camera management, suctioning, suturing, and retraction.    PREOPERATIVE DIAGNOSIS:  left inguinal hernia.   POSTOPERATIVE DIAGNOSIS: left  indirect inguinal hernia.   PROCEDURE: Laparoscopic left inguinal hernia repair with mesh (totally extraperitoneal).   ANESTHESIA: General.   ESTIMATED BLOOD LOSS: Less than 5 mL.   OPERATIVE PROCEDURE: After induction of general endotracheal anesthesia, Grayson Nam's abdomen was prepped and draped in the usual sterile fashion. Through an infraumbilical incision, the right anterior rectus sheath was opened sharply and retrorectus dissection down to the space of Retzius. Balloon dissector was utilized and eventually pneumopelvis was established. Two additional 5 mm trocars were placed in the midline below the umbilicus. Dissection of the left groin was then conducted exposing the entire myopectineal orifice. It was then clear that the patient had an indirect inguinal hernia which was fully reduced. The vas deferens, spermatic vessels, inferior epigastric vessels and iliac vessels were all identified and protected. The peritoneum was peeled off from the retroperitoneal structures until enough space had been achieved for placement of the mesh. At this point, a progrip mesh was advanced into the surgical field and placed completely covering the myopectineal orifice. The mesh was secured in place with its adherent side to the abdominal and pelvic wall.  We then allowed the peritoneum to roll on top of the mesh holding it in good position.   We then explored the contralateral groin and vital structures were identified, no hernia noted. We then evacuated the pneumopelvis and removed the trocars under direct visualization without evidence of bleeding. The camera port was removed, small peritoneum was evacuated then the posterior and and the  anterior rectus sheath was then closed with multiple interrupted 0 Vicryl suture. Skin was approximated with 4-0 Monocryl. Steri-Strips and sterile dressing applied. No immediate complications.

## 2018-02-15 NOTE — IP AVS SNAPSHOT
MRN:[id number]                      After Visit Summary   2/15/2018    Grayson Nam    MRN: [id number]           Thank you!     Thank you for choosing [facility] for your care. Our goal is always to provide you with excellent care. Hearing back from our patients is one way we can continue to improve our services. Please take a few minutes to complete the written survey that you may receive in the mail after you visit with us. Thank you!        Patient Information     Date Of Birth          1965        About your hospital stay     You were admitted on:  February 15, 2018 You last received care in the:  [facility]    You were discharged on:  February 15, 2018       Who to Call     For medical emergencies, please call 911.  For non-urgent questions about your medical care, please call your primary care provider or clinic, [phone]  For questions related to your surgery, please call your surgery clinic        Attending Provider     Provider Tahir Sweet MD Surgery       Primary Care Provider Office Phone # Fax #    Sadaf Kramer [phone]      Further instructions from your care team       [facility]  Discharge Instructions: Post-Operative Laparoscopic Inguinal Hernia    ACTIVITY    Take frequent, short walks and increase your activity gradually.      Avoid strenuous physical activity or heavy lifting greater than 15 lbs. for 3-4 weeks.  You may climb stairs.    You may drive without restrictions when you are not using any prescription pain medication and feel comfortable in a car.    You may return to work/school when you are comfortable without any prescription pain medication.    WOUND CARE    You may remove your outer dressing or Band-Aids and shower 48 hours after the surgery.  Pat your incisions dry and leave them open to air.  Re-apply dressing (Band-Aids or gauze/tape) as needed  for comfort or drainage.    You may have steri-strips (looks like white tape) on your incision.  You may peel off the steri-strips 2 weeks after your surgery if they have not peeled off on their own.     Do not soak your incisions in a tub or pool for 2 weeks.     Do not apply any lotions, creams, or ointments to your incisions.    A ridge under your incisions is normal and will gradually resolve.    DIET    Start with liquids, then gradually resume your regular diet as tolerated.     Drink plenty of fluids to stay hydrated.    PAIN    Expect some tenderness and discomfort at the incision site(s).  Use the prescribed pain medication at your discretion.  Expect gradual resolution of your pain over several days.    Do not take any additional acetaminophen/APAP/Tylenol until you are weaning off Lortab.    Do not drink alcohol or drive while you are taking pain medications.    You may apply ice to your incisions in 20 minute intervals as needed for the next 48 hours.  After that time, consider switching to heat if you prefer.    EXPECTATIONS    You may notice air in your scrotum and/or penis after the surgery.  This is due to the gas used during the surgery.  You can expect some swelling and bruising involving the scrotum and/or penis as well as numbness.  These symptoms are expected and should gradually resolve in the next few days.  You may use ice to help with the swelling and try placing a rolled hand towel below your scrotum to help alleviate scrotal discomfort.  If you develop significant testicular or penile pain, please call our office and speak with a nurse.    Pain medications can cause constipation.  Limit use when possible.  Take over the counter stool softener/stimulant, such as Colace or Senna, 1-2 times a day with plenty of water.  You may take a mild over the counter laxative, such as Miralax or a suppository, as needed. You may discontinue these medications once you are having regular bowel movements  and/or are no longer taking your narcotic pain medication.     You may have shoulder or upper back discomfort due to the gas used in surgery.  This is temporary and should resolve in 48-72 hours.  Short, frequent walks may help with this.    FOLLOW UP    Our office will contact you approximately 2 weeks to check on your progress and answer any questions you may have.  If you are doing well, you will not need to return for a follow up appointment.  If any concerns are identified over the phone, we will help you make an appointment to see a provider.     If you have not received a phone call, have any questions or concerns, or would like to be seen, please call us at [phone] and ask to speak with our nurse.  We are located at [address] [place] [address].    CALL OUR OFFICE -[phone] IF YOU HAVE:     Chills or fever above 101 F.    Increased redness, warmth, or drainage at your incisions.    Significant bleeding.    Pain not relieved by your pain medication or rest.    Increasing pain after the first 48 hours.    Any other concerns or questions.    Revised January 2018      **If you have questions or concerns about your procedure,  call Dr. Sena at [phone]**      Same Day Surgery Discharge Instructions for  Sedation and General Anesthesia       It's not unusual to feel dizzy, light-headed or faint for up to 24 hours after surgery or while taking pain medication.  If you have these symptoms: sit for a few minutes before standing and have someone assist you when you get up to walk or use the bathroom.      You should rest and relax for the next 24 hours. We recommend you make arrangements to have an adult stay with you for at least 24 hours after your discharge.  Avoid hazardous and strenuous activity.      DO NOT DRIVE any vehicle or operate mechanical equipment for 24 hours following the end of your surgery.  Even though you may feel normal, your reactions may be affected by  "the medication you have received.      Do not drink alcoholic beverages for 24 hours following surgery.       Slowly progress to your regular diet as you feel able. It's not unusual to feel nauseated and/or vomit after receiving anesthesia.  If you develop these symptoms, drink clear liquids (apple juice, ginger ale, broth, 7-up, etc. ) until you feel better.  If your nausea and vomiting persists for 24 hours, please notify your surgeon.        All narcotic pain medications, along with inactivity and anesthesia, can cause constipation. Drinking plenty of liquids and increasing fiber intake will help.      For any questions of a medical nature, call your surgeon.      Do not make important decisions for 24 hours.      If you had general anesthesia, you may have a sore throat for a couple of days related to the breathing tube used during surgery.  You may use Cepacol lozenges to help with this discomfort.  If it worsens or if you develop a fever, contact your surgeon.       If you feel your pain is not well managed with the pain medications prescribed by your surgeon, please contact your surgeon's office to let them know so they can address your concerns.             Pending Results     Date and Time Order Name Status Description    12/15/2017 0000 EKG CARDIAC - HIM SCAN Preliminary             Admission Information     Date & Time Provider Department Dept. Phone    2/15/2018 Tahir Sena MD [facility] Same Day Surgery [phone]      Your Vitals Were     Blood Pressure Temperature Respirations Height Weight Pulse Oximetry    106/71 97.5  F (36.4  C) 16 1.702 m (5' 7\") 101.2 kg (223 lb) 95%    BMI (Body Mass Index)                   34.93 kg/m2           [employer] Information     [employer] lets you send messages to your doctor, view your test results, renew your prescriptions, schedule appointments and more. To sign up, go to [email or web address] . Click on \"Log in\" on the left side of the screen, which " "will take you to the Welcome page. Then click on \"Sign up Now\" on the right side of the page.     You will be asked to enter the access code listed below, as well as some personal information. Please follow the directions to create your username and password.     Your access code is: [email or web address]  Expires: 3/15/2018  2:12 AM     Your access code will  in 90 days. If you need help or a new code, please call your [place] clinic or [phone].        Care EveryWhere ID     This is your Care EveryWhere ID. This could be used by other organizations to access your [place] medical records  [id number]        Equal Access to Services     FLORECITA MCBRIDE : Kyler Reyez, david toledo, drake gamez, ady kingston. So [facility] [phone].    ATENCIÓN: Si habla español, tiene a pugh disposición servicios gratuitos de asistencia lingüística. Llame al [phone].    We comply with applicable federal civil rights laws and [place] laws. We do not discriminate on the basis of race, color, national origin, age, disability, sex, sexual orientation, or gender identity.               Review of your medicines      START taking        Dose / Directions    HYDROcodone-acetaminophen 7.5-325 MG/15ML solution   Used for:  Left inguinal hernia, Acute post-operative pain   Replaces:  HYDROcodone-acetaminophen 7.5-500 MG/15ML solution   Notes to Patient:  15 mL given at at 4:15PM        Dose:  10-15 mL   Take 10-15 mLs by mouth 4 times daily as needed for pain   Quantity:  270 mL   Refills:  0         CONTINUE these medicines which have NOT CHANGED        Dose / Directions    BUPROPION HCL ER (SR) PO        Dose:  150 mg   Take 150 mg by mouth 2 times daily   Refills:  0       fluticasone-salmeterol 100-50 MCG/DOSE diskus inhaler   Commonly known as:  ADVAIR        Dose:  1 puff   Inhale 1 puff into the lungs 2 times daily   Refills:  0       LISINOPRIL PO        Dose:  10 mg "   Take 10 mg by mouth daily   Refills:  0       PRAVASTATIN SODIUM PO        Dose:  20 mg   Take 20 mg by mouth every evening   Refills:  0       VENTOLIN HFA IN        Dose:  90 mcg   Inhale 90 mcg into the lungs   Refills:  0       VITAMIN D PO        Take  by mouth daily.   Refills:  0         STOP taking     HYDROcodone-acetaminophen 7.5-500 MG/15ML solution   Commonly known as:  LORTAB   Replaced by:  HYDROcodone-acetaminophen 7.5-325 MG/15ML solution                Where to get your medicines      Some of these will need a paper prescription and others can be bought over the counter. Ask your nurse if you have questions.     Bring a paper prescription for each of these medications     HYDROcodone-acetaminophen 7.5-325 MG/15ML solution                Protect others around you: Learn how to safely use, store and throw away your medicines at www.disposemymeds.org.        Information about OPIOIDS     PRESCRIPTION OPIOIDS: WHAT YOU NEED TO KNOW    Prescription opioids can be used to help relieve moderate to severe pain and are often prescribed following a surgery or injury, or for certain health conditions. These medications can be an important part of treatment but also come with serious risks. It is important to work with your health care provider to make sure you are getting the safest, most effective care.    WHAT ARE THE RISKS AND SIDE EFFECTS OF OPIOID USE?  Prescription opioids carry serious risks of addiction and overdose, especially with prolonged use. An opioid overdose, often marked by slowed breathing can cause sudden death. The use of prescription opioids can have a number of side effects as well, even when taken as directed:      Tolerance - meaning you might need to take more of a medication for the same pain relief    Physical dependence - meaning you have symptoms of withdrawal when a medication is stopped    Increased sensitivity to pain    Constipation    Nausea, vomiting, and dry  mouth    Sleepiness and dizziness    Confusion    Depression    Low levels of testosterone that can result in lower sex drive, energy, and strength    Itching and sweating    RISKS ARE GREATER WITH:    History of drug misuse, substance use disorder, or overdose    Mental health conditions (such as depression or anxiety)    Sleep apnea    Older age (65 years or older)    Pregnancy    Avoid alcohol while taking prescription opioids.   Also, unless specifically advised by your health care provider, medications to avoid include:    Benzodiazepines (such as Xanax or Valium)    Muscle relaxants (such as Soma or Flexeril)    Hypnotics (such as Ambien or Lunesta)    Other prescription opioids    KNOW YOUR OPTIONS:  Talk to your health care provider about ways to manage your pain that do not involve prescription opioids. Some of these options may actually work better and have fewer risks and side effects:    Pain relievers such as acetaminophen, ibuprofen, and naproxen    Some medications that are also used for depression or seizures    Physical therapy and exercise    Cognitive behavioral therapy, a psychological, goal-directed approach, in which patients learn how to modify physical, behavioral, and emotional triggers of pain and stress    IF YOU ARE PRESCRIBED OPIOIDS FOR PAIN:    Never take opioids in greater amounts or more often than prescribed    Follow up with your primary health care provider and work together to create a plan on how to manage your pain.    Talk about ways to help manage your pain that do not involve prescription opioids    Talk about all concerns and side effects    Help prevent misuse and abuse    Never sell or share prescription opioids    Never use another person's prescription opioids    Store prescription opioids in a secure place and out of reach of others (this may include visitors, children, friends, and family)    Visit www.cdc.gov/drugoverdose to learn about risks of opioid abuse and  overdose    If you believe you may be struggling with addiction, tell your health care provider and ask for guidance or call [facility]'s National Helpline at 2-043-168-HELP    LEARN MORE / www.cdc.gov/drugoverdose/prescribing/guideline.html    Safely dispose of unused prescription opioids: Find your local drug take-back programs and more information about the importance of safe disposal at www.doseofreality.mn.gov             Medication List: This is a list of all your medications and when to take them. Check marks below indicate your daily home schedule. Keep this list as a reference.      Medications           Morning Afternoon Evening Bedtime As Needed    BUPROPION HCL ER (SR) PO   Take 150 mg by mouth 2 times daily                                fluticasone-salmeterol 100-50 MCG/DOSE diskus inhaler   Commonly known as:  ADVAIR   Inhale 1 puff into the lungs 2 times daily                                HYDROcodone-acetaminophen 7.5-325 MG/15ML solution   Take 10-15 mLs by mouth 4 times daily as needed for pain   Last time this was given:  15 mLs on 2/15/2018  4:15 PM   Notes to Patient:  15 mL given at at 4:15PM                                LISINOPRIL PO   Take 10 mg by mouth daily                                PRAVASTATIN SODIUM PO   Take 20 mg by mouth every evening                                VENTOLIN HFA IN   Inhale 90 mcg into the lungs                                VITAMIN D PO   Take  by mouth daily.

## 2018-02-15 NOTE — ANESTHESIA POSTPROCEDURE EVALUATION
Patient: Grayson Nam    Procedure(s):  LAPAROSCOPIC LEFT INGUINAL HERNIA REPAIR WITH MESH - Wound Class: I-Clean    Diagnosis:LEFT INGUINAL HERNIA  Diagnosis Additional Information: No value filed.    Anesthesia Type:  General, LMA    Note:  Anesthesia Post Evaluation    Patient location during evaluation: PACU  Patient participation: Able to fully participate in evaluation  Level of consciousness: awake  Pain management: adequate  Airway patency: patent  Cardiovascular status: acceptable  Respiratory status: acceptable  Hydration status: acceptable  PONV: none     Anesthetic complications: None          Last vitals:  Vitals:    02/15/18 1500 02/15/18 1505 02/15/18 1510   BP: 100/67     Resp: 13 14 15   Temp: 36.4  C (97.5  F)  36.4  C (97.5  F)   SpO2: 96% 97% 97%         Electronically Signed By: Vitaliy Navarrete MD  February 15, 2018  3:25 PM

## 2018-02-15 NOTE — BRIEF OP NOTE
[facility] Brief Operative Note    Pre-operative diagnosis: LEFT INGUINAL HERNIA   Post-operative diagnosis Left Inguinal Hernia     Procedure: Procedure(s):  LAPAROSCOPIC LEFT INGUINAL HERNIA REPAIR WITH MESH - Wound Class: I-Clean   Surgeon(s): Surgeon(s) and Role:     * Tahir Sena MD - Primary     * Horace Thorpe PA-C - Assisting   Estimated blood loss: 5 mL    Specimens: * No specimens in log *   Findings: Progrip [id number] mesh used  No hernia appreciated on Right side.  See Operative Report for full details.  No complications noted.

## 2018-02-15 NOTE — ANESTHESIA PREPROCEDURE EVALUATION
Anesthesia Evaluation     . Pt has had prior anesthetic.     No history of anesthetic complications          ROS/MED HX    ENT/Pulmonary:     (+)asthma , . .    Neurologic:       Cardiovascular:     (+) hypertension----. : . . . :. .       METS/Exercise Tolerance:     Hematologic:         Musculoskeletal:         GI/Hepatic:         Renal/Genitourinary:     (+) chronic renal disease, type: CRI,       Endo:     (+) Obesity (BMI 35), .      Psychiatric:         Infectious Disease:         Malignancy:         Other:                     Physical Exam  Normal systems: cardiovascular and pulmonary    Airway   Mallampati: II  TM distance: >3 FB  Neck ROM: full    Dental     Cardiovascular       Pulmonary                     Anesthesia Plan      History & Physical Review  History and physical reviewed and following examination; no interval change.    ASA Status:  2 .    NPO Status:  > 8 hours    Plan for General and LMA with Intravenous and Propofol induction. Maintenance will be TIVA.    PONV prophylaxis:  Ondansetron (or other 5HT-3) and Dexamethasone or Solumedrol       Postoperative Care  Postoperative pain management:  IV analgesics and Oral pain medications.      Consents  Anesthetic plan, risks, benefits and alternatives discussed with:  Patient..        DPreop diagnosis: LEFT INGUINAL HERNIA  Procedure(s):  LAPAROSCOPIC HERNIORRHAPHY INGUINAL  Allergies   Allergen Reactions     Animal Dander      Atorvastatin      Novocain [Procaine]        No current facility-administered medications on file prior to encounter.   Current Outpatient Prescriptions on File Prior to Encounter:  fluticasone-salmeterol (ADVAIR) 100-50 MCG/DOSE diskus inhaler Inhale 1 puff into the lungs 2 times daily   LISINOPRIL PO Take 10 mg by mouth daily    PRAVASTATIN SODIUM PO Take 20 mg by mouth every evening    Albuterol Sulfate (VENTOLIN HFA IN) Inhale 90 mcg into the lungs   HYDROcodone-acetaminophen (LORTAB) 7.5-500 MG/15ML solution Take 10  mLs by mouth 4 times daily as needed for pain. (Patient not taking: Reported on 1/29/2018)   Cholecalciferol (VITAMIN D PO) Take  by mouth daily.     Hemoglobin   Date Value Ref Range Status   12/15/2017 14.9 13.3 - 17.7 g/dL Final     Potassium   Date Value Ref Range Status   12/15/2017 4.2 3.4 - 5.3 mmol/L Final                       .

## 2018-02-26 ENCOUNTER — OFFICE VISIT (OUTPATIENT)
Dept: SURGERY | Facility: CLINIC | Age: 53
End: 2018-02-26
Payer: COMMERCIAL

## 2018-02-26 DIAGNOSIS — Z09 SURGERY FOLLOW-UP: Primary | ICD-10-CM

## 2018-02-26 PROCEDURE — 99024 POSTOP FOLLOW-UP VISIT: CPT | Performed by: SURGERY

## 2018-02-26 NOTE — LETTER
2018    Re: Grayson Nam, : 1965    First postoperative visit for Mr. Nam after laparoscopic left inguinal hernia repair.  He has been doing quite well, pain was minimal, gastrointestinal function is back to normal.  There has been no fevers nor chills.     On exam his incisions have healed nicely without evidence of infection or hernia.     Good recovery after laparoscopic left inguinal hernia repair.  I reviewed the physical limitations he should follow for the next 2 weeks and he understands.     He will return to see us as needed, if questions please call us.     Best regards.    Tahir Sena MD    Surgeon: Tahir Sena MD  1st Assistant: Horace Thorpe PA-C,The physicians assistant was medically necessary for their expertise in camera management, suctioning, suturing, and retraction.     PREOPERATIVE DIAGNOSIS:  left inguinal hernia.   POSTOPERATIVE DIAGNOSIS: left  indirect inguinal hernia.   PROCEDURE: Laparoscopic left inguinal hernia repair with mesh (totally extraperitoneal).   ANESTHESIA: General.   ESTIMATED BLOOD LOSS: Less than 5 mL.   OPERATIVE PROCEDURE: After induction of general endotracheal anesthesia, Grayson Nam's abdomen was prepped and draped in the usual sterile fashion. Through an infraumbilical incision, the right anterior rectus sheath was opened sharply and retrorectus dissection down to the space of Retzius. Balloon dissector was utilized and eventually pneumopelvis was established. Two additional 5 mm trocars were placed in the midline below the umbilicus. Dissection of the left groin was then conducted exposing the entire myopectineal orifice. It was then clear that the patient had an indirect inguinal hernia which was fully reduced. The vas deferens, spermatic vessels, inferior epigastric vessels and iliac vessels were all identified and protected. The peritoneum was peeled off from the retroperitoneal structures until enough space had been  achieved for placement of the mesh. At this point, a progrip mesh was advanced into the surgical field and placed completely covering the myopectineal orifice. The mesh was secured in place with its adherent side to the abdominal and pelvic wall.  We then allowed the peritoneum to roll on top of the mesh holding it in good position.   We then explored the contralateral groin and vital structures were identified, no hernia noted. We then evacuated the pneumopelvis and removed the trocars under direct visualization without evidence of bleeding. The camera port was removed, small peritoneum was evacuated then the posterior and and the anterior rectus sheath was then closed with multiple interrupted 0 Vicryl suture. Skin was approximated with 4-0 Monocryl. Steri-Strips and sterile dressing applied. No immediate complications.

## 2018-02-26 NOTE — MR AVS SNAPSHOT
"              After Visit Summary   2018    Grayson Nam    MRN: [id number]           Patient Information     Date Of Birth          1965        Visit Information        Provider Department      2018 10:45 AM Tahir Sena MD [facility] General Surgery      Today's Diagnoses     Surgery follow-up    -  1       Follow-ups after your visit        Who to contact     If you have questions or need follow up information about today's clinic visit or your schedule please contact [facility] directly at [phone].  Normal or non-critical lab and imaging results will be communicated to you by [employer], letter or phone within 4 business days after the clinic has received the results. If you do not hear from us within 7 days, please contact the clinic through [employer] or phone. If you have a critical or abnormal lab result, we will notify you by phone as soon as possible.  Submit refill requests through [employer] or call your pharmacy and they will forward the refill request to us. Please allow 3 business days for your refill to be completed.          Additional Information About Your Visit        MyChart Information     [employer] lets you send messages to your doctor, view your test results, renew your prescriptions, schedule appointments and more. To sign up, go to [email or web address] . Click on \"Log in\" on the left side of the screen, which will take you to the Welcome page. Then click on \"Sign up Now\" on the right side of the page.     You will be asked to enter the access code listed below, as well as some personal information. Please follow the directions to create your username and password.     Your access code is: [email or web address]  Expires: 3/15/2018  2:12 AM     Your access code will  in 90 days. If you need help or a new code, please call your [place] clinic or [phone].        Care EveryWhere ID     This is your Care " EveryWhere ID. This could be used by other organizations to access your [place] medical records  [id number]         Blood Pressure from Last 3 Encounters:   02/15/18 106/71   01/29/18 118/78   12/15/17 123/73    Weight from Last 3 Encounters:   02/15/18 223 lb (101.2 kg)   12/15/17 236 lb (107 kg)   05/23/13 218 lb (98.9 kg)              Today, you had the following     No orders found for display       Primary Care Provider Office Phone # Fax #    Sadaf Kramer [phone]       [facility] [address]  [facility] [address]        Equal Access to Services     FLORECITA MCBRIDE : Hadii colin luis Somelissa, waaxda luqadaha, qaybta kaalmada adesheila, ady kingston. So [facility] [phone].    ATENCIÓN: Si habla español, tiene a pugh disposición servicios gratuitos de asistencia lingüística. Llame al [phone].    We comply with applicable federal civil rights laws and [place] laws. We do not discriminate on the basis of race, color, national origin, age, disability, sex, sexual orientation, or gender identity.            Thank you!     Thank you for choosing [facility]  for your care. Our goal is always to provide you with excellent care. Hearing back from our patients is one way we can continue to improve our services. Please take a few minutes to complete the written survey that you may receive in the mail after your visit with us. Thank you!             Your Updated Medication List - Protect others around you: Learn how to safely use, store and throw away your medicines at www.disposemymeds.org.          This list is accurate as of 2/26/18 10:48 AM.  Always use your most recent med list.                   Brand Name Dispense Instructions for use Diagnosis    BUPROPION HCL ER (SR) PO      Take 150 mg by mouth 2 times daily        fluticasone-salmeterol 100-50 MCG/DOSE diskus inhaler    ADVAIR     Inhale 1 puff into the lungs 2 times  daily        HYDROcodone-acetaminophen 7.5-325 MG/15ML solution     270 mL    Take 10-15 mLs by mouth 4 times daily as needed for pain    Left inguinal hernia, Acute post-operative pain       LISINOPRIL PO      Take 10 mg by mouth daily        PRAVASTATIN SODIUM PO      Take 20 mg by mouth every evening        VENTOLIN HFA IN      Inhale 90 mcg into the lungs        VITAMIN D PO      Take  by mouth daily.

## 2018-02-28 NOTE — PROGRESS NOTES
First postoperative visit for Mr. Nam after laparoscopic left inguinal hernia repair.  He has been doing quite well, pain was minimal, gastrointestinal function is back to normal.  There has been no fevers nor chills.    On exam his incisions have healed nicely without evidence of infection or hernia.    Good recovery after laparoscopic left inguinal hernia repair.  I reviewed the physical limitations he should follow for the next 2 weeks and he understands.    He will return to see us as needed, if questions please call us.    Best regards.    Please route or send letter to:  Primary Care Provider (PCP), Referring Provider, Include Op Note and Include Progress Note

## 2018-08-16 ENCOUNTER — TRANSFERRED RECORDS (OUTPATIENT)
Dept: HEALTH INFORMATION MANAGEMENT | Facility: CLINIC | Age: 53
End: 2018-08-16

## 2018-08-17 ENCOUNTER — HOSPITAL ENCOUNTER (EMERGENCY)
Facility: CLINIC | Age: 53
Discharge: HOME | End: 2018-08-17
Attending: EMERGENCY MEDICINE | Admitting: EMERGENCY MEDICINE
Payer: COMMERCIAL

## 2018-08-17 VITALS
RESPIRATION RATE: 16 BRPM | TEMPERATURE: 98.2 F | HEART RATE: 96 BPM | DIASTOLIC BLOOD PRESSURE: 88 MMHG | BODY MASS INDEX: 36.57 KG/M2 | WEIGHT: 233 LBS | HEIGHT: 67 IN | OXYGEN SATURATION: 95 % | SYSTOLIC BLOOD PRESSURE: 150 MMHG

## 2018-08-17 DIAGNOSIS — R74.8 ELEVATED CPK: ICD-10-CM

## 2018-08-17 LAB
ALBUMIN UR-MCNC: NEGATIVE MG/DL
ANION GAP SERPL CALCULATED.3IONS-SCNC: 7 MMOL/L (ref 3–14)
APPEARANCE UR: CLEAR
BASOPHILS # BLD AUTO: 0.1 10E9/L (ref 0–0.2)
BASOPHILS NFR BLD AUTO: 1.1 %
BILIRUB UR QL STRIP: NEGATIVE
BUN SERPL-MCNC: 17 MG/DL (ref 7–30)
CALCIUM SERPL-MCNC: 8.2 MG/DL (ref 8.5–10.1)
CHLORIDE SERPL-SCNC: 109 MMOL/L (ref 94–109)
CK SERPL-CCNC: 243 U/L (ref 30–300)
CO2 SERPL-SCNC: 27 MMOL/L (ref 20–32)
COLOR UR AUTO: NORMAL
CREAT SERPL-MCNC: 1.41 MG/DL (ref 0.66–1.25)
DIFFERENTIAL METHOD BLD: NORMAL
EOSINOPHIL # BLD AUTO: 0.2 10E9/L (ref 0–0.7)
EOSINOPHIL NFR BLD AUTO: 3.7 %
ERYTHROCYTE [DISTWIDTH] IN BLOOD BY AUTOMATED COUNT: 12.4 % (ref 10–15)
GFR SERPL CREATININE-BSD FRML MDRD: 52 ML/MIN/1.7M2
GLUCOSE SERPL-MCNC: 124 MG/DL (ref 70–99)
GLUCOSE UR STRIP-MCNC: NEGATIVE MG/DL
HCT VFR BLD AUTO: 43 % (ref 40–53)
HGB BLD-MCNC: 14.8 G/DL (ref 13.3–17.7)
HGB UR QL STRIP: NEGATIVE
IMM GRANULOCYTES # BLD: 0 10E9/L (ref 0–0.4)
IMM GRANULOCYTES NFR BLD: 0.5 %
KETONES UR STRIP-MCNC: NEGATIVE MG/DL
LEUKOCYTE ESTERASE UR QL STRIP: NEGATIVE
LYMPHOCYTES # BLD AUTO: 3.3 10E9/L (ref 0.8–5.3)
LYMPHOCYTES NFR BLD AUTO: 50.5 %
MCH RBC QN AUTO: 31 PG (ref 26.5–33)
MCHC RBC AUTO-ENTMCNC: 34.4 G/DL (ref 31.5–36.5)
MCV RBC AUTO: 90 FL (ref 78–100)
MONOCYTES # BLD AUTO: 0.6 10E9/L (ref 0–1.3)
MONOCYTES NFR BLD AUTO: 9.9 %
NEUTROPHILS # BLD AUTO: 2.2 10E9/L (ref 1.6–8.3)
NEUTROPHILS NFR BLD AUTO: 34.3 %
NITRATE UR QL: NEGATIVE
NRBC # BLD AUTO: 0 10*3/UL
NRBC BLD AUTO-RTO: 0 /100
PH UR STRIP: 5.5 PH (ref 5–7)
PLATELET # BLD AUTO: 172 10E9/L (ref 150–450)
POTASSIUM SERPL-SCNC: 4.1 MMOL/L (ref 3.4–5.3)
RBC # BLD AUTO: 4.78 10E12/L (ref 4.4–5.9)
RBC #/AREA URNS AUTO: 0 /HPF (ref 0–2)
SODIUM SERPL-SCNC: 143 MMOL/L (ref 133–144)
SOURCE: NORMAL
SP GR UR STRIP: 1.01 (ref 1–1.03)
UROBILINOGEN UR STRIP-MCNC: NORMAL MG/DL (ref 0–2)
WBC # BLD AUTO: 6.4 10E9/L (ref 4–11)
WBC #/AREA URNS AUTO: <1 /HPF (ref 0–5)

## 2018-08-17 PROCEDURE — 96360 HYDRATION IV INFUSION INIT: CPT

## 2018-08-17 PROCEDURE — 36415 COLL VENOUS BLD VENIPUNCTURE: CPT

## 2018-08-17 PROCEDURE — 80048 BASIC METABOLIC PNL TOTAL CA: CPT | Performed by: EMERGENCY MEDICINE

## 2018-08-17 PROCEDURE — 82550 ASSAY OF CK (CPK): CPT | Performed by: EMERGENCY MEDICINE

## 2018-08-17 PROCEDURE — 25000128 H RX IP 250 OP 636: Performed by: EMERGENCY MEDICINE

## 2018-08-17 PROCEDURE — 81001 URINALYSIS AUTO W/SCOPE: CPT | Performed by: EMERGENCY MEDICINE

## 2018-08-17 PROCEDURE — 99284 EMERGENCY DEPT VISIT MOD MDM: CPT | Mod: 25

## 2018-08-17 PROCEDURE — 85025 COMPLETE CBC W/AUTO DIFF WBC: CPT | Performed by: EMERGENCY MEDICINE

## 2018-08-17 PROCEDURE — 96361 HYDRATE IV INFUSION ADD-ON: CPT

## 2018-08-17 RX ORDER — SODIUM CHLORIDE 9 MG/ML
1000 INJECTION, SOLUTION INTRAVENOUS CONTINUOUS
Status: DISCONTINUED | OUTPATIENT
Start: 2018-08-17 | End: 2018-08-17 | Stop reason: HOSPADM

## 2018-08-17 RX ADMIN — SODIUM CHLORIDE 1000 ML: 9 INJECTION, SOLUTION INTRAVENOUS at 18:30

## 2018-08-17 ASSESSMENT — ENCOUNTER SYMPTOMS
ARTHRALGIAS: 1
MYALGIAS: 1
ACTIVITY CHANGE: 0
NECK STIFFNESS: 1

## 2018-08-17 NOTE — ED AVS SNAPSHOT
Emergency Department    [address] [place] [address]    Phone:  [phone]    Fax:  [phone]                                       Grayson Nam   MRN: [id number]    Department:   Emergency Department   Date of Visit:  8/17/2018           After Visit Summary Signature Page     I have received my discharge instructions, and my questions have been answered. I have discussed any challenges I see with this plan with the nurse or doctor.    ..........................................................................................................................................  Patient/Patient Representative Signature      ..........................................................................................................................................  Patient Representative Print Name and Relationship to Patient    ..................................................               ................................................  Date                                            Time    ..........................................................................................................................................  Reviewed by Signature/Title    ...................................................              ..............................................  Date                                                            Time

## 2018-08-17 NOTE — ED AVS SNAPSHOT
Emergency Department    [address] [facility] [address]    Phone:  [phone]    Fax:  [phone]                                       Grayson Nam   MRN: [id number]    Department:   Emergency Department   Date of Visit:  8/17/2018           Patient Information     Date Of Birth          1965        Your diagnoses for this visit were:     Elevated CPK        You were seen by Catina Montalvo MD.      Follow-up Information     Follow up with Sadaf Kramer    Why:  2 weeks.     Contact information:    [facility]  [address]  [facility] [address] [phone]          Discharge Instructions       Discontinue Pravastatin. Drink adequate fluids.       24 Hour Appointment Hotline       To make an appointment at any [facility], call [phone] ([phone]). If you don't have a family doctor or clinic, we will help you find one. [facility] clinics are conveniently located to serve the needs of you and your family.             Review of your medicines      Our records show that you are taking the medicines listed below. If these are incorrect, please call your family doctor or clinic.        Dose / Directions Last dose taken    BUPROPION HCL ER (SR) PO   Dose:  150 mg        Take 150 mg by mouth 2 times daily   Refills:  0        fluticasone-salmeterol 100-50 MCG/DOSE diskus inhaler   Commonly known as:  ADVAIR   Dose:  1 puff        Inhale 1 puff into the lungs 2 times daily   Refills:  0        HYDROcodone-acetaminophen 7.5-325 MG/15ML solution   Dose:  10-15 mL   Quantity:  270 mL        Take 10-15 mLs by mouth 4 times daily as needed for pain   Refills:  0        LISINOPRIL PO   Dose:  10 mg        Take 10 mg by mouth daily   Refills:  0        PRAVASTATIN SODIUM PO   Dose:  20 mg        Take 20 mg by mouth every evening   Refills:  0        VENTOLIN HFA IN   Dose:  90 mcg        Inhale 90 mcg into the lungs   Refills:  0        VITAMIN D  PO        Take  by mouth daily.   Refills:  0                Procedures and tests performed during your visit     Basic metabolic panel    CBC with platelets differential    CK total    UA with Microscopic      Orders Needing Specimen Collection     None      Pending Results     No orders found from 8/15/2018 to 8/18/2018.            Pending Culture Results     No orders found from 8/15/2018 to 8/18/2018.            Pending Results Instructions     If you had any lab results that were not finalized at the time of your Discharge, you can call the ED Lab Result RN at [phone]. You will be contacted by this team for any positive Lab results or changes in treatment. The nurses are available 7 days a week from 10A to 6:30P.  You can leave a message 24 hours per day and they will return your call.        Test Results From Your Hospital Stay        8/17/2018  8:37 PM      Component Results     Component Value Ref Range & Units Status    WBC 6.4 4.0 - 11.0 10e9/L Final    RBC Count 4.78 4.4 - 5.9 10e12/L Final    Hemoglobin 14.8 13.3 - 17.7 g/dL Final    Hematocrit 43.0 40.0 - 53.0 % Final    MCV 90 78 - 100 fl Final    MCH 31.0 26.5 - 33.0 pg Final    MCHC 34.4 31.5 - 36.5 g/dL Final    RDW 12.4 10.0 - 15.0 % Final    Platelet Count 172 150 - 450 10e9/L Final    Diff Method Automated Method  Final    % Neutrophils 34.3 % Final    % Lymphocytes 50.5 % Final    % Monocytes 9.9 % Final    % Eosinophils 3.7 % Final    % Basophils 1.1 % Final    % Immature Granulocytes 0.5 % Final    Nucleated RBCs 0 0 /100 Final    Absolute Neutrophil 2.2 1.6 - 8.3 10e9/L Final    Absolute Lymphocytes 3.3 0.8 - 5.3 10e9/L Final    Absolute Monocytes 0.6 0.0 - 1.3 10e9/L Final    Absolute Eosinophils 0.2 0.0 - 0.7 10e9/L Final    Absolute Basophils 0.1 0.0 - 0.2 10e9/L Final    Abs Immature Granulocytes 0.0 0 - 0.4 10e9/L Final    Absolute Nucleated RBC 0.0  Final         8/17/2018  8:50 PM      Component Results     Component Value Ref  Range & Units Status    Sodium 143 133 - 144 mmol/L Final    Potassium 4.1 3.4 - 5.3 mmol/L Final    Chloride 109 94 - 109 mmol/L Final    Carbon Dioxide 27 20 - 32 mmol/L Final    Anion Gap 7 3 - 14 mmol/L Final    Glucose 124 (H) 70 - 99 mg/dL Final    Urea Nitrogen 17 7 - 30 mg/dL Final    Creatinine 1.41 (H) 0.66 - 1.25 mg/dL Final    GFR Estimate 52 (L) >60 mL/min/1.7m2 Final    Non  GFR Calc    GFR Estimate If Black 63 >60 mL/min/1.7m2 Final    African American GFR Calc    Calcium 8.2 (L) 8.5 - 10.1 mg/dL Final         8/17/2018  8:53 PM      Component Results     Component Value Ref Range & Units Status    CK Total 243 30 - 300 U/L Final         8/17/2018  8:20 PM      Component Results     Component Value Ref Range & Units Status    Color Urine Straw  Final    Appearance Urine Clear  Final    Glucose Urine Negative NEG^Negative mg/dL Final    Bilirubin Urine Negative NEG^Negative Final    Ketones Urine Negative NEG^Negative mg/dL Final    Specific Gravity Urine 1.006 1.003 - 1.035 Final    Blood Urine Negative NEG^Negative Final    pH Urine 5.5 5.0 - 7.0 pH Final    Protein Albumin Urine Negative NEG^Negative mg/dL Final    Urobilinogen mg/dL Normal 0.0 - 2.0 mg/dL Final    Nitrite Urine Negative NEG^Negative Final    Leukocyte Esterase Urine Negative NEG^Negative Final    Source Midstream Urine  Final    WBC Urine <1 0 - 5 /HPF Final    RBC Urine 0 0 - 2 /HPF Final                Clinical Quality Measure: Blood Pressure Screening     Your blood pressure was checked while you were in the emergency department today. The last reading we obtained was  BP: 145/87 . Please read the guidelines below about what these numbers mean and what you should do about them.  If your systolic blood pressure (the top number) is less than 120 and your diastolic blood pressure (the bottom number) is less than 80, then your blood pressure is normal. There is nothing more that you need to do about it.  If your  "systolic blood pressure (the top number) is 120-139 or your diastolic blood pressure (the bottom number) is 80-89, your blood pressure may be higher than it should be. You should have your blood pressure rechecked within a year by a primary care provider.  If your systolic blood pressure (the top number) is 140 or greater or your diastolic blood pressure (the bottom number) is 90 or greater, you may have high blood pressure. High blood pressure is treatable, but if left untreated over time it can put you at risk for heart attack, stroke, or kidney failure. You should have your blood pressure rechecked by a primary care provider within the next 4 weeks.  If your provider in the emergency department today gave you specific instructions to follow-up with your doctor or provider even sooner than that, you should follow that instruction and not wait for up to 4 weeks for your follow-up visit.        Thank you for choosing [facility]       Thank you for choosing [facility] for your care. Our goal is always to provide you with excellent care. Hearing back from our patients is one way we can continue to improve our services. Please take a few minutes to complete the written survey that you may receive in the mail after you visit with us. Thank you!        [employer] Information     [employer] lets you send messages to your doctor, view your test results, renew your prescriptions, schedule appointments and more. To sign up, go to [email or web address] . Click on \"Log in\" on the left side of the screen, which will take you to the Welcome page. Then click on \"Sign up Now\" on the right side of the page.     You will be asked to enter the access code listed below, as well as some personal information. Please follow the directions to create your username and password.     Your access code is: [email or web address]  Expires: 11/15/2018  9:03 PM     Your access code will  in 90 days. If you need help or a new code, please call your [facility] " [facility] or [phone].        Care EveryWhere ID     This is your Care EveryWhere ID. This could be used by other organizations to access your [place] medical records  [id number]        Equal Access to Services     FLORECITA MCBRIDE : Kyler Reyez, wapurada luqadaha, qaybta kaalmada franco, ady kingston. So [facility] [phone].    ATENCIÓN: Si habla español, tiene a pugh disposición servicios gratuitos de asistencia lingüística. Llame al [phone].    We comply with applicable federal civil rights laws and [place] laws. We do not discriminate on the basis of race, color, national origin, age, disability, sex, sexual orientation, or gender identity.            After Visit Summary       This is your record. Keep this with you and show to your community pharmacist(s) and doctor(s) at your next visit.

## 2018-08-18 NOTE — ED PROVIDER NOTES
History     Chief Complaint:  Abnormal Labs    HPI   Grayson Nam is a 53 year old male, with hypertension, who presents with abnormal labs. Two days ago, he had some muscle aches in his upper back, base of neck and a headache, along with muscle aches in his arms and legs. He was seen at [facility] in [place] yesterday, and got a call today from MD that his labs are showing Rhabdo and advised to present to the ED. CK was 334, Creatinine was 1.62(close to baseline for him) , lyme testing was negative, sed rate was 6. He does endorse having muscle pain in legs, and forearms ongoing for the past 3 months. He also endorses joint pain in his fingers, elbows, and knees but denies any history of rheumatoid arthritis, lupus, or lyme's. Yesterday he drank 3 liters of water as advised. Denies recent falls or trauma. He was seen recently at 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 He has not recently had any physical exertion, no mowing and he has a desk job as an . He was on vacation last week, but was not near any woods and denies any tick bites. He is on Pravastatin for cholesterol.    Allergies:  Animal dander  Atorvastatin  Novocain     Medications:    Ventolin  Bupropion  Vitamin D  Advair  Hydrocodone  Lisinopril  Pravastatin     Past Medical History:    Adjustment disorder with anxiety  Diverticulosis  Essential hypertension, benign  High cholesterol  History of blood transfusion  Kidney disease  Mixed hyperlipemia  Proteinuria    Past Surgical History:    Appendectomy open  Colonoscopy  Laparoscopic herniorrhaphy inguinal, left    Family History:    Hypertension  Cerebrovascular disease    Social History:  Relationship status:   Tobacco use: Rarely.  Alcohol use: Yes, 0.6-1.2 oz per week. States rare alcohol and tobacco use.  The patient presents with his wife, son, daughter-in-law, and grandson.    He works as an , and does not do any physical labor at work,  "and rarely mows the lawn at home.  Marital Status:   [2]     Review of Systems   Constitutional: Negative for activity change.   Musculoskeletal: Positive for arthralgias, myalgias and neck stiffness.   Skin: Negative for rash.   All other systems reviewed and are negative.    Physical Exam   Vitals:  Patient Vitals for the past 24 hrs:   BP Temp Temp src Pulse Resp SpO2 Height Weight   08/17/18 2108 150/88 - - 96 - 95 % - -   08/17/18 1817 145/87 98.2  F (36.8  C) Oral 91 16 96 % 1.702 m (5' 7\") 105.7 kg (233 lb)        Physical Exam  Constitutional:  Oriented to person, place, and time.      Appears well-developed and well-nourished.   HENT:   Head:    Normocephalic and atraumatic.   Right Ear:   Tympanic membrane and external ear normal.   Left Ear:   Tympanic membrane and external ear normal.   Mouth/Throat:   Oropharynx is clear and moist.      Mucous membranes are normal.   Eyes:    Conjunctivae normal and EOM are normal.      Pupils are equal, round, and reactive to light.   Neck:    Normal range of motion. Neck supple.   Cardiovascular:  Normal rate, regular rhythm, S1 normal and S2 normal.      No gallop and no friction rub. No murmur heard.  Pulmonary/Chest:  Breath sounds normal. No respiratory distress.      No wheezes. No rhonchi. No rales.   Abdominal:   Soft. No hepatosplenomegaly. No tenderness.      No rebound and no CVA tenderness.   Musculoskeletal:  Mild right paracervical spasm. Right forearm tenderness.Normal range of motion.   Neurological:   Alert and oriented to person, place, and time. Normal strength.      GCS eye subscore is 4. GCS verbal subscore is 5.      GCS motor subscore is 6.   Skin:    Skin is warm and dry. No rash or abrasion.  Psychiatric:   Normal mood and affect.      Speech is normal and behavior is normal.      Judgment and thought content normal.      Cognition and memory are normal.      Emergency Department Course     Laboratory:  Laboratory findings were " communicated with the patient who voiced understanding of the findings.  CBC: AWNL (WBC 6.4, HGB 14.8, )  BMP: Glucose: 124(H), Creatinine: 1.41(H), GFR: 52(L), Calcium: 8.2(L) o/w WNL   CK total: 243  UA with microscopic: AWNL    Interventions:  2008 Normal Saline 1000 mL IV      Emergency Department Course:  Nursing notes and vitals reviewed.  I performed an exam of the patient as documented above.   IV was inserted and blood was drawn for laboratory testing, results above.  The patient provided a urine sample here in the emergency department. This was sent for laboratory testing, findings above.   2102 I updated the patient, and discussed discharge.  Findings and plan explained to the Patient. Patient discharged home with instructions regarding supportive care, medications, and reasons to return. The importance of close follow-up was reviewed.    I personally reviewed the laboratory results with the Patient and answered all related questions prior to discharge.    Impression & Plan      Medical Decision Making:  The patient is a 53-year-old male who is referred in for recheck of CPK and renal function.  He was seen in the clinic yesterday with muscle aches over the last 3 months and some headache and neck pain.  She dipak labs and called him today that his CPK was 334 and his creatinine was elevated although similar to previous and he was advised to report to the emergency department.  The patient's headache and neck pain have gotten better and he has only mild muscle aches at this time.  Lyme testing was done which was negative and the report of Ehrlichia was testing done which I do not see resulted.  The patient however denies that he goes outside where he thinks would have tick exposure.  Thankfully his laboratory work here is unremarkable.  He is on pravastatin and I will have him discontinue that.  He will follow-up with his clinic in 2 weeks as recommended by his doctor.  He can follow-up sooner if  worse.    Diagnosis:    ICD-10-CM    1. Elevated CPK R74.8         Disposition:   Discharged to home    CMS Diagnoses: None     Scribe Disclosure:  I, Nicole Terry, am serving as a scribe at 8:00 PM on 8/17/2018 to document services personally performed by Catina Montalvo MD, based on my observations and the provider's statements to me.     Nicole Terry  8/17/2018    EMERGENCY DEPARTMENT       Catina Montalvo MD  08/18/18 0942

## 2018-12-28 ENCOUNTER — TRANSFERRED RECORDS (OUTPATIENT)
Dept: HEALTH INFORMATION MANAGEMENT | Facility: CLINIC | Age: 53
End: 2018-12-28

## 2018-12-28 ENCOUNTER — OFFICE VISIT (OUTPATIENT)
Dept: ORTHOPEDICS | Facility: CLINIC | Age: 53
End: 2018-12-28
Payer: COMMERCIAL

## 2018-12-28 ENCOUNTER — ANCILLARY PROCEDURE (OUTPATIENT)
Dept: GENERAL RADIOLOGY | Facility: CLINIC | Age: 53
End: 2018-12-28
Attending: FAMILY MEDICINE
Payer: COMMERCIAL

## 2018-12-28 VITALS
WEIGHT: 233 LBS | HEIGHT: 67 IN | SYSTOLIC BLOOD PRESSURE: 133 MMHG | BODY MASS INDEX: 36.57 KG/M2 | DIASTOLIC BLOOD PRESSURE: 87 MMHG

## 2018-12-28 DIAGNOSIS — M25.562 LEFT KNEE PAIN, UNSPECIFIED CHRONICITY: Primary | ICD-10-CM

## 2018-12-28 DIAGNOSIS — M25.562 LEFT KNEE PAIN, UNSPECIFIED CHRONICITY: ICD-10-CM

## 2018-12-28 PROCEDURE — 99204 OFFICE O/P NEW MOD 45 MIN: CPT | Performed by: FAMILY MEDICINE

## 2018-12-28 PROCEDURE — 73562 X-RAY EXAM OF KNEE 3: CPT | Mod: FY | Performed by: FAMILY MEDICINE

## 2018-12-28 RX ORDER — OMEGA-3 FATTY ACIDS/FISH OIL 300-1000MG
CAPSULE ORAL
Status: ON HOLD | COMMUNITY
End: 2019-05-01

## 2018-12-28 RX ORDER — LOSARTAN POTASSIUM 25 MG/1
25 TABLET ORAL EVERY EVENING
COMMUNITY
Start: 2018-12-18 | End: 2019-05-22

## 2018-12-28 ASSESSMENT — MIFFLIN-ST. JEOR: SCORE: 1855.51

## 2018-12-28 NOTE — LETTER
12/28/2018         RE: Grayson Nam  [id number]  [facility] [address]        Dear Colleague,    Thank you for referring your patient, Grayson Nam, to the [facility]. Please see a copy of my visit note below.    HPI     [facility]   Clinic Visit s Dec 28, 2018    PCP: Sadaf Kramer is a 53 year old male who is seen as self referral for   Chief Complaint   Patient presents with     Left Knee - Pain       Injury: Patient describes injury from kneeling on his other knee.        Location of Pain: left knee posterior, nonradiating   Duration of Pain: 1 day(s)  Rating of Pain at worst: 8/10  Rating of Pain Currently: 3/10  Pain is better with: activity avoidance   Pain is worse with: stair climbing  Treatment so far consists of: no treatment tried to date  Associated symptoms: no distal numbness or tingling; denies swelling or warmth  Recent imaging completed: xrays form [facility] 7/2018  Prior History of related problems: noticed similar pain 6 months ago, he did some physical therapy     Social History: is employed as a/an     Past Medical History:   Diagnosis Date     Adjustment disorder with anxiety      Cough variant asthma      Diverticulosis      Essential hypertension, benign     Hypertension, Benign     High cholesterol      History of blood transfusion      Kidney disease     CKD stage 3     Mixed hyperlipidemia      Proteinuria        Patient Active Problem List    Diagnosis Date Noted     URI (upper respiratory infection) 09/22/2012     Priority: Medium       Family History   Problem Relation Age of Onset     Hypertension Father      Cerebrovascular Disease Father      Diabetes Maternal Grandmother      Cerebrovascular Disease Maternal Grandfather      Unknown/Adopted Paternal Grandmother      Unknown/Adopted Paternal Grandfather        Social History     Socioeconomic History     Marital  "status:      Spouse name: Not on file     Number of children: Not on file     Years of education: Not on file     Highest education level: Not on file   Social Needs     Financial resource strain: Not on file     Food insecurity - worry: Not on file     Food insecurity - inability: Not on file     Transportation needs - medical: Not on file     Transportation needs - non-medical: Not on file   Occupational History     Not on file   Tobacco Use     Smoking status: Never Smoker     Smokeless tobacco: Never Used   Substance and Sexual Activity     Alcohol use: Yes     Alcohol/week: 0.6 - 1.2 oz     Types: 1 - 2 Cans of beer per week     Comment: occas.       Past Surgical History:   Procedure Laterality Date     APPENDECTOMY OPEN  1971     COLONOSCOPY       LAPAROSCOPIC HERNIORRHAPHY INGUINAL Left 2/15/2018    Procedure: LAPAROSCOPIC HERNIORRHAPHY INGUINAL;  LAPAROSCOPIC LEFT INGUINAL HERNIA REPAIR WITH MESH;  Surgeon: Tahir Sena MD;  Location: [facility]           Review of Systems   Musculoskeletal: Positive for joint pain.   All other systems reviewed and are negative.        Physical Exam   Musculoskeletal:        Left knee: He exhibits no effusion.       /87   Ht 1.702 m (5' 7\")   Wt 105.7 kg (233 lb)   BMI 36.49 kg/m     Constitutional:well-developed, well-nourished, and in no distress.   Cardiovascular: Intact distal pulses.    Neurological: alert. Gait Normal:   Gait, station, stance, and balance appear normal for age  Skin: Skin is warm and dry.   Psychiatric: Mood and affect normal.   Respiratory: unlabored, speaks in full sentences  Lymph: no LAD, no lymphangitis          Left Knee Exam     Tenderness   The patient is experiencing tenderness in the lateral joint line (posterior capsule).    Range of Motion   Extension: normal   Flexion: normal     Tests   Lex:  Medial - negative Lateral - positive  Varus: negative Valgus: negative  Lachman:  Anterior - negative      Drawer:  Anterior - " negative     Posterior - negative  Patellar apprehension: negative    Other   Erythema: absent  Scars: absent  Sensation: normal  Pulse: present  Swelling: none  Effusion: no effusion present    Comments:  Intermittent locking symptoms are noted as occasionally he gets sharp pain with lateral Lex's type maneuver but repeating this provocative test seems to relieve these sharp flareups.  Multiple episodes observed during the visit today.          XR Knee Lt 3 Views7/26/2018  [facility]  Result Narrative   COMPARISON:  None.    FINDINGS:  3 views are obtained of the left knee. No fracture or malalignment. Minimal degenerative narrowing in the medial compartment. Small amount of fluid in the suprapatellar recess.    2 views obtained of the contralateral right knee for comparison. Minimal degenerative narrowing in the medial compartment.   Other Result Information   Jermain, Rad Results In - 07/26/2018  9:41 AM CDT  COMPARISON:  None.    FINDINGS:  3 views are obtained of the left knee. No fracture or malalignment. Minimal degenerative narrowing in the medial compartment. Small amount of fluid in the suprapatellar recess.    2 views obtained of the contralateral right knee for comparison. Minimal degenerative narrowing in the medial compartment.       X-ray images Ordered and independently reviewed by me in the office today with the patient. X-ray shows:   Recent Results (from the past 48 hour(s))   XR Knee Standing AP Bilat Annada Bilat Lat Left    Narrative    12/28/2018    No fracture, dislocation and or lesion. Normal alignment.  Joint space   maintained no significant arthritis. No appreciable soft tissue   abnormality       ASSESSMENT/PLAN    ICD-10-CM    1. Left knee pain, unspecified chronicity M25.562 XR Knee Standing AP Bilat Annada Bilat Lat Left     MR Knee Left w/o Contrast     Normal appearing x-rays but with intermittent catching symptoms, a loose body or unstable meniscus flap is a strong  possibility.  Discussed options, will send for an MRI and call with results, possible surgical referral if positive.    Again, thank you for allowing me to participate in the care of your patient.        Sincerely,        Eduardo Sepulveda MD

## 2018-12-28 NOTE — PROGRESS NOTES
[facility]   Clinic Visit s Dec 28, 2018    PCP: Sadaf Kramer      Grayson is a 53 year old male who is seen as self referral for   Chief Complaint   Patient presents with     Left Knee - Pain       Injury: Patient describes injury from kneeling on his other knee.        Location of Pain: left knee posterior, nonradiating   Duration of Pain: 1 day(s)  Rating of Pain at worst: 8/10  Rating of Pain Currently: 3/10  Pain is better with: activity avoidance   Pain is worse with: stair climbing  Treatment so far consists of: no treatment tried to date  Associated symptoms: no distal numbness or tingling; denies swelling or warmth  Recent imaging completed: xrays form [facility] 7/2018  Prior History of related problems: noticed similar pain 6 months ago, he did some physical therapy     Social History: is employed as a/an     Past Medical History:   Diagnosis Date     Adjustment disorder with anxiety      Cough variant asthma      Diverticulosis      Essential hypertension, benign     Hypertension, Benign     High cholesterol      History of blood transfusion      Kidney disease     CKD stage 3     Mixed hyperlipidemia      Proteinuria        Patient Active Problem List    Diagnosis Date Noted     URI (upper respiratory infection) 09/22/2012     Priority: Medium       Family History   Problem Relation Age of Onset     Hypertension Father      Cerebrovascular Disease Father      Diabetes Maternal Grandmother      Cerebrovascular Disease Maternal Grandfather      Unknown/Adopted Paternal Grandmother      Unknown/Adopted Paternal Grandfather        Social History     Socioeconomic History     Marital status:      Spouse name: Not on file     Number of children: Not on file     Years of education: Not on file     Highest education level: Not on file   Social Needs     Financial resource strain: Not on file     Food insecurity - worry: Not on file     Food insecurity -  "inability: Not on file     Transportation needs - medical: Not on file     Transportation needs - non-medical: Not on file   Occupational History     Not on file   Tobacco Use     Smoking status: Never Smoker     Smokeless tobacco: Never Used   Substance and Sexual Activity     Alcohol use: Yes     Alcohol/week: 0.6 - 1.2 oz     Types: 1 - 2 Cans of beer per week     Comment: occas.       Past Surgical History:   Procedure Laterality Date     APPENDECTOMY OPEN  1971     COLONOSCOPY       LAPAROSCOPIC HERNIORRHAPHY INGUINAL Left 2/15/2018    Procedure: LAPAROSCOPIC HERNIORRHAPHY INGUINAL;  LAPAROSCOPIC LEFT INGUINAL HERNIA REPAIR WITH MESH;  Surgeon: Tahir Sena MD;  Location: [facility]           Review of Systems   Musculoskeletal: Positive for joint pain.   All other systems reviewed and are negative.        Physical Exam   Musculoskeletal:        Left knee: He exhibits no effusion.       /87   Ht 1.702 m (5' 7\")   Wt 105.7 kg (233 lb)   BMI 36.49 kg/m    Constitutional:well-developed, well-nourished, and in no distress.   Cardiovascular: Intact distal pulses.    Neurological: alert. Gait Normal:   Gait, station, stance, and balance appear normal for age  Skin: Skin is warm and dry.   Psychiatric: Mood and affect normal.   Respiratory: unlabored, speaks in full sentences  Lymph: no LAD, no lymphangitis          Left Knee Exam     Tenderness   The patient is experiencing tenderness in the lateral joint line (posterior capsule).    Range of Motion   Extension: normal   Flexion: normal     Tests   Lex:  Medial - negative Lateral - positive  Varus: negative Valgus: negative  Lachman:  Anterior - negative      Drawer:  Anterior - negative     Posterior - negative  Patellar apprehension: negative    Other   Erythema: absent  Scars: absent  Sensation: normal  Pulse: present  Swelling: none  Effusion: no effusion present    Comments:  Intermittent locking symptoms are noted as occasionally he gets sharp pain " with lateral Lex's type maneuver but repeating this provocative test seems to relieve these sharp flareups.  Multiple episodes observed during the visit today.          XR Knee Lt 3 Views7/26/2018  [facility]  Result Narrative   COMPARISON:  None.    FINDINGS:  3 views are obtained of the left knee. No fracture or malalignment. Minimal degenerative narrowing in the medial compartment. Small amount of fluid in the suprapatellar recess.    2 views obtained of the contralateral right knee for comparison. Minimal degenerative narrowing in the medial compartment.   Other Result Information   Jermain, Rad Results In - 07/26/2018  9:41 AM CDT  COMPARISON:  None.    FINDINGS:  3 views are obtained of the left knee. No fracture or malalignment. Minimal degenerative narrowing in the medial compartment. Small amount of fluid in the suprapatellar recess.    2 views obtained of the contralateral right knee for comparison. Minimal degenerative narrowing in the medial compartment.       X-ray images Ordered and independently reviewed by me in the office today with the patient. X-ray shows:   Recent Results (from the past 48 hour(s))   XR Knee Standing AP Bilat Ola Bilat Lat Left    Narrative    12/28/2018    No fracture, dislocation and or lesion. Normal alignment.  Joint space   maintained no significant arthritis. No appreciable soft tissue   abnormality       ASSESSMENT/PLAN    ICD-10-CM    1. Left knee pain, unspecified chronicity M25.562 XR Knee Standing AP Bilat Ola Bilat Lat Left     MR Knee Left w/o Contrast     Normal appearing x-rays but with intermittent catching symptoms, a loose body or unstable meniscus flap is a strong possibility.  Discussed options, will send for an MRI and call with results, possible surgical referral if positive.

## 2019-01-08 ENCOUNTER — TELEPHONE (OUTPATIENT)
Dept: ORTHOPEDICS | Facility: CLINIC | Age: 54
End: 2019-01-08

## 2019-01-08 NOTE — TELEPHONE ENCOUNTER
Notified of MRI showing no evidence of internal derangement or chondromalacia, loose bodies, or meniscus tears.  The isolated finding of a Baker's cyst without known etiology.  Upon speaking with patient, he states that he has not had a locking episode since his visit here, and pain overall is better.  Therefore continued observation is recommended and avoidance of prolonged kneeling or deep squatting in general.  Follow-up as needed.

## 2019-04-16 NOTE — TELEPHONE ENCOUNTER
ONCOLOGY INTAKE: Records Information      APPT INFORMATION: 4/24/19 at 1:45PM  Referring provider:  Dr. Andrés Cheng  Referring provider s clinic:  [facility]  Reason for visit/diagnosis:  Kidney Mass    RECORDS INFORMATION:  Were the records received with the referral (via [employer])? No    Has patient been seen for any external appt for this diagnosis? Yes    If yes, where? [facility]    Has patient had any imaging or procedures outside of [facility] for this condition? Yes      If Yes, where? [facility]    ADDITIONAL INFORMATION:  Per pt's wife, pt was only seen at [facility] ([facility]). [facility] has been updated. Please obtain all imaging for review. No biopsy has been taken.

## 2019-04-22 NOTE — TELEPHONE ENCOUNTER
RECORDS STATUS - ALL OTHER DIAGNOSIS      RECORDS RECEIVED FROM: [facility]   DATE RECEIVED: 04/24/2019   NOTES STATUS DETAILS   OFFICE NOTE from referring provider Complete [facility] 04/16/19 Dr. Cheng   OFFICE NOTE from medical oncologist n/a    DISCHARGE SUMMARY from hospital n/a    DISCHARGE REPORT from the ER Complete [facility] 04/16/19 Dr. Cheng   OPERATIVE REPORT n/a    MEDICATION LIST Complete [facility]   CLINICAL TRIAL TREATMENTS TO DATE n/a    LABS     PATHOLOGY REPORTS n/a    ANYTHING RELATED TO DIAGNOSIS Complete [facility]   GENONOMIC TESTING     TYPE: n/a    IMAGING (NEED IMAGES & REPORT)     CT SCANS Complete [facility] ([facility]) 04/16/19 PACS   MRI n/a    MAMMO n/a    ULTRASOUND Complete [facility] (2009) PACS   PET n/a

## 2019-04-22 NOTE — TELEPHONE ENCOUNTER
Both the [facility] [place] and [facility] film room are closed. Need to call back during business hours, to request images. Appt not pending any other records. Will call tomorrow am.

## 2019-04-23 NOTE — TELEPHONE ENCOUNTER
[facility] US was pushed and resolved. In PACS.    [facility] CT was pushed and resovled. In PACS.

## 2019-04-24 ENCOUNTER — PRE VISIT (OUTPATIENT)
Dept: ONCOLOGY | Facility: CLINIC | Age: 54
End: 2019-04-24

## 2019-04-24 ENCOUNTER — ONCOLOGY VISIT (OUTPATIENT)
Dept: ONCOLOGY | Facility: CLINIC | Age: 54
End: 2019-04-24
Attending: UROLOGY
Payer: COMMERCIAL

## 2019-04-24 ENCOUNTER — HOSPITAL ENCOUNTER (OUTPATIENT)
Facility: CLINIC | Age: 54
Setting detail: SPECIMEN
End: 2019-04-24
Attending: UROLOGY
Payer: COMMERCIAL

## 2019-04-24 VITALS
SYSTOLIC BLOOD PRESSURE: 128 MMHG | DIASTOLIC BLOOD PRESSURE: 87 MMHG | RESPIRATION RATE: 18 BRPM | HEIGHT: 67 IN | TEMPERATURE: 98.9 F | OXYGEN SATURATION: 97 % | BODY MASS INDEX: 36.32 KG/M2 | HEART RATE: 89 BPM | WEIGHT: 231.4 LBS

## 2019-04-24 DIAGNOSIS — N28.89 RIGHT RENAL MASS: Primary | ICD-10-CM

## 2019-04-24 PROBLEM — E78.5 HYPERLIPIDEMIA: Status: ACTIVE | Noted: 2018-01-18

## 2019-04-24 PROBLEM — K57.30 DIVERTICULOSIS OF SIGMOID COLON: Status: ACTIVE | Noted: 2019-04-24

## 2019-04-24 PROBLEM — N18.2 CKD (CHRONIC KIDNEY DISEASE) STAGE 2, GFR 60-89 ML/MIN: Status: ACTIVE | Noted: 2018-08-16

## 2019-04-24 PROCEDURE — G0463 HOSPITAL OUTPT CLINIC VISIT: HCPCS

## 2019-04-24 PROCEDURE — 99204 OFFICE O/P NEW MOD 45 MIN: CPT | Performed by: UROLOGY

## 2019-04-24 RX ORDER — CEFAZOLIN SODIUM 1 G
1 VIAL (EA) INJECTION SEE ADMIN INSTRUCTIONS
Status: CANCELLED | OUTPATIENT
Start: 2019-04-24

## 2019-04-24 RX ORDER — TRAMADOL HYDROCHLORIDE 50 MG/1
TABLET ORAL
Refills: 0 | Status: ON HOLD | COMMUNITY
Start: 2019-04-18 | End: 2019-05-01

## 2019-04-24 ASSESSMENT — PAIN SCALES - GENERAL: PAINLEVEL: MILD PAIN (3)

## 2019-04-24 ASSESSMENT — MIFFLIN-ST. JEOR: SCORE: 1843.25

## 2019-04-24 NOTE — LETTER
4/24/2019         RE: Grayson Nam  [id number]  [facility] [address]        Dear Colleague,    Thank you for referring your patient, Grayson Nam, to the [facility]. Please see a copy of my visit note below.          Chief Complaint:   Right Renal Mass         History of Present Illness:    Grayson Nam is a very pleasant 54 year old male who presents with a history of an incidentally discovered right renal mass. He noted right flank/back pain approximately 2 weeks ago. This was bothersome enough that he presented to ED on 4/16 and was incidentally noted to have a large right renal mass. This mass is approximately 8 cm and is rather centrally located in kidney. No vein thrombus noted. No evidence of lymphadenopathy or other obvious evidence of metastatic disease. He has no hematuria or dysuria.    He does note finding of painless right-sided scrotal swelling that has been gradually worsening over past few weeks.         Past Medical History:     Past Medical History:   Diagnosis Date     Adjustment disorder with anxiety      Cough variant asthma      Diverticulosis      Essential hypertension, benign     Hypertension, Benign     High cholesterol      History of blood transfusion      Kidney disease     CKD stage 3     Mixed hyperlipidemia      Proteinuria           Past Surgical History:     Past Surgical History:   Procedure Laterality Date     APPENDECTOMY OPEN  1971     COLONOSCOPY       LAPAROSCOPIC HERNIORRHAPHY INGUINAL Left 2/15/2018    Procedure: LAPAROSCOPIC HERNIORRHAPHY INGUINAL;  LAPAROSCOPIC LEFT INGUINAL HERNIA REPAIR WITH MESH;  Surgeon: Tahir Sena MD;  Location: [facility]          Medications     Current Outpatient Medications   Medication     Albuterol Sulfate (VENTOLIN HFA IN)     BUPROPION HCL ER, SR, PO     Cholecalciferol (VITAMIN D PO)     fluticasone-salmeterol (ADVAIR) 100-50 MCG/DOSE diskus inhaler     losartan (COZAAR) 25 MG  tablet     omega 3 1000 MG CAPS     ranitidine (ZANTAC) 150 MG tablet     traMADol (ULTRAM) 50 MG tablet     No current facility-administered medications for this visit.           Family History:     Family History   Problem Relation Age of Onset     Hypertension Father      Cerebrovascular Disease Father      Diabetes Maternal Grandmother      Cerebrovascular Disease Maternal Grandfather      Unknown/Adopted Paternal Grandmother      Unknown/Adopted Paternal Grandfather    No  Malignancy         Social History:     Social History     Socioeconomic History     Marital status:      Spouse name: Not on file     Number of children: Not on file     Years of education: Not on file     Highest education level: Not on file   Occupational History     Not on file   Social Needs     Financial resource strain: Not on file     Food insecurity:     Worry: Not on file     Inability: Not on file     Transportation needs:     Medical: Not on file     Non-medical: Not on file   Tobacco Use     Smoking status: Never Smoker     Smokeless tobacco: Never Used   Substance and Sexual Activity     Alcohol use: Yes     Alcohol/week: 0.6 - 1.2 oz     Types: 1 - 2 Cans of beer per week     Comment: occas.     Drug use: No     Sexual activity: Not on file   Lifestyle     Physical activity:     Days per week: Not on file     Minutes per session: Not on file     Stress: Not on file   Relationships     Social connections:     Talks on phone: Not on file     Gets together: Not on file     Attends [employer] service: Not on file     Active member of club or organization: Not on file     Attends meetings of clubs or organizations: Not on file     Relationship status: Not on file     Intimate partner violence:     Fear of current or ex partner: Not on file     Emotionally abused: Not on file     Physically abused: Not on file     Forced sexual activity: Not on file   Other Topics Concern     Not on file   Social History Narrative     Not on  "file   Works as  for [employer] [place]         Allergies:   Animal dander; Atorvastatin; and Novocain [procaine]         Review of Systems:  From intake questionnaire     Skin: negative  Eyes: negative  Ears/Nose/Throat: negative  Respiratory: No shortness of breath, dyspnea on exertion, cough, or hemoptysis  Cardiovascular: No chest pain or palpitations  Gastrointestinal: negative; no nausea/vomiting, constipation or diarrhea  Genitourinary: as per HPI  Musculoskeletal: negative  Neurologic: negative  Psychiatric: negative  Hematologic/Lymphatic/Immunologic: negative  Endocrine: negative         Physical Exam:     Patient is a 54 year old  male   Vitals: Blood pressure 128/87, pulse 89, temperature 98.9  F (37.2  C), temperature source Oral, resp. rate 18, height 1.702 m (5' 7\"), weight 105 kg (231 lb 6.4 oz), SpO2 97 %.  Constitutional: Body mass index is 36.24 kg/m .  Alert, no acute distress, oriented, conversant  Eyes: no scleral icterus; extraocular muscles intact, moist conjunctivae  Neck: trachea midline, no thyromegaly  Ears/nose/mouth: throat/mouth:normal, good dentition  Respiratory: no respiratory distress, or pursed lip breathing  Cardiovascular: pulses strong and intact; no obvious jugular venous distension present  Gastrointestinal: soft, nontender, no organomegaly or masses. Right-sided mass not clearly palpable.  Lymphatics: No inguinal adenopathy  Musculoskeltal: extremities normal, no peripheral edema  Skin: no suspicious lesions or rashes  Neuro: Alert, oriented, speech and mentation normal  Psych: affect and mood normal, alert and oriented to person, place and time  Gait: Normal  : Bilateral testes descended and palpable with no lesions or tenderness. Right varicocele noted, Grade III.      Labs and Pathology:    I reviewed all applicable laboratory and pathology data and went over findings with patient  Significant for   Lab Results   Component Value Date    CR 1.41 " 08/17/2018    CR 1.92 12/15/2017    CR 1.31 05/23/2013       Imaging:    I reviewed all applicable imaging and went over findings with patient.    CT abd/pelvis 4/16/2019  IMPRESSION:  1. Enhancing 8 cm mass in the right kidney is a neoplasm likely representing renal cell carcinoma.   2. Indeterminate 7 mm pulmonary nodule in the right lower lobe.   3. No other signs of metastatic disease or other significant finding in the lower chest, abdomen or pelvis.      Outside and Past Medical records:    I spent 10 minutes reviewing outside and past medical records.         Assessment and Plan:     Assessment: 54 year old male with newly discovered right renal mass, suspicious for renal cell carcinoma. We had an extensive discussion about the significance of a localized, enhancing kidney mass.  We discussed that approximately 80% of enhancing renal masses turn out to be kidney cancer upon removal, while 20% are found to be benign.  Although certain features such as size, gender and tumor characteristics can change these risks. Given size and appearance of this mass, it likely represents renal cell carcinoma.    We discussed the role of renal mass biopsy including the fact that renal mass biopsy is very safe with current techniques (risk of tumor seeding far below 1%).  Though renal mass biopsy is usually quite accurate 90-95% of the time, it can be inaccurate and it can be non-diagnostic.  Furthermore, the majority of patients find they just need to proceed to surgery anyway.     We discussed the options for treatment of a localized kidney mass including active surveillance, thermal ablation, and surgical extirpation. Given size of this mass and location, surveillance and ablation are not appropriate options.    Furthermore, we discussed the relative merits of partial nephrectomy vs. radical nephrectomy and the theoretic basis behind maximal nephron preservation.  Mass is not amenable to partial nephrectomy. We also  discussed the advantages and disadvantages and roles of open surgery vs. laparoscopic (and Da Jesse assisted) surgery.    Risks and benefits of laparoscopic nephrectomy were discussed in details. Risks of surgery including bleeding, infection, MI, stroke, DVT/PE, bowel injury and injury to other surrounding structures were discussed. In addition, we discussed potential for positive surgical margins,vascular injury, and potential need for conversion to an open procedure.  All questions were answered.  A written informed consent will be finalized on the morning of the procedure. The risks, benefits, alternatives, and personnel involved in the procedure were discussed. The anticipated post-operative course was explained.    Patient states he has had chest CT, and will obtain these images for me to review, to allow for completion of staging.    Plan:  Schedule for right laparoscopic nephrectomy    Orders  Orders Placed This Encounter   Procedures     Christy-Operative Worksheet  (Urology General)     Lino Esparza MD  Urology  [facility]  Clinic Phone [phone]        Again, thank you for allowing me to participate in the care of your patient.        Sincerely,        Lino Esparza MD

## 2019-04-24 NOTE — PROGRESS NOTES
Chief Complaint:   Right Renal Mass         History of Present Illness:    Grayson Nam is a very pleasant 54 year old male who presents with a history of an incidentally discovered right renal mass. He noted right flank/back pain approximately 2 weeks ago. This was bothersome enough that he presented to ED on 4/16 and was incidentally noted to have a large right renal mass. This mass is approximately 8 cm and is rather centrally located in kidney. No vein thrombus noted. No evidence of lymphadenopathy or other obvious evidence of metastatic disease. He has no hematuria or dysuria.    He does note finding of painless right-sided scrotal swelling that has been gradually worsening over past few weeks.         Past Medical History:     Past Medical History:   Diagnosis Date     Adjustment disorder with anxiety      Cough variant asthma      Diverticulosis      Essential hypertension, benign     Hypertension, Benign     High cholesterol      History of blood transfusion      Kidney disease     CKD stage 3     Mixed hyperlipidemia      Proteinuria           Past Surgical History:     Past Surgical History:   Procedure Laterality Date     APPENDECTOMY OPEN  1971     COLONOSCOPY       LAPAROSCOPIC HERNIORRHAPHY INGUINAL Left 2/15/2018    Procedure: LAPAROSCOPIC HERNIORRHAPHY INGUINAL;  LAPAROSCOPIC LEFT INGUINAL HERNIA REPAIR WITH MESH;  Surgeon: Tahir Sena MD;  Location: [facility]          Medications     Current Outpatient Medications   Medication     Albuterol Sulfate (VENTOLIN HFA IN)     BUPROPION HCL ER, SR, PO     Cholecalciferol (VITAMIN D PO)     fluticasone-salmeterol (ADVAIR) 100-50 MCG/DOSE diskus inhaler     losartan (COZAAR) 25 MG tablet     omega 3 1000 MG CAPS     ranitidine (ZANTAC) 150 MG tablet     traMADol (ULTRAM) 50 MG tablet     No current facility-administered medications for this visit.           Family History:     Family History   Problem Relation Age of Onset     Hypertension  Father      Cerebrovascular Disease Father      Diabetes Maternal Grandmother      Cerebrovascular Disease Maternal Grandfather      Unknown/Adopted Paternal Grandmother      Unknown/Adopted Paternal Grandfather    No  Malignancy         Social History:     Social History     Socioeconomic History     Marital status:      Spouse name: Not on file     Number of children: Not on file     Years of education: Not on file     Highest education level: Not on file   Occupational History     Not on file   Social Needs     Financial resource strain: Not on file     Food insecurity:     Worry: Not on file     Inability: Not on file     Transportation needs:     Medical: Not on file     Non-medical: Not on file   Tobacco Use     Smoking status: Never Smoker     Smokeless tobacco: Never Used   Substance and Sexual Activity     Alcohol use: Yes     Alcohol/week: 0.6 - 1.2 oz     Types: 1 - 2 Cans of beer per week     Comment: occas.     Drug use: No     Sexual activity: Not on file   Lifestyle     Physical activity:     Days per week: Not on file     Minutes per session: Not on file     Stress: Not on file   Relationships     Social connections:     Talks on phone: Not on file     Gets together: Not on file     Attends Jew service: Not on file     Active member of club or organization: Not on file     Attends meetings of clubs or organizations: Not on file     Relationship status: Not on file     Intimate partner violence:     Fear of current or ex partner: Not on file     Emotionally abused: Not on file     Physically abused: Not on file     Forced sexual activity: Not on file   Other Topics Concern     Not on file   Social History Narrative     Not on file   Works as  for [employer]         Allergies:   Animal dander; Atorvastatin; and Novocain [procaine]         Review of Systems:  From intake questionnaire     Skin: negative  Eyes: negative  Ears/Nose/Throat: negative  Respiratory: No  "shortness of breath, dyspnea on exertion, cough, or hemoptysis  Cardiovascular: No chest pain or palpitations  Gastrointestinal: negative; no nausea/vomiting, constipation or diarrhea  Genitourinary: as per HPI  Musculoskeletal: negative  Neurologic: negative  Psychiatric: negative  Hematologic/Lymphatic/Immunologic: negative  Endocrine: negative         Physical Exam:     Patient is a 54 year old  male   Vitals: Blood pressure 128/87, pulse 89, temperature 98.9  F (37.2  C), temperature source Oral, resp. rate 18, height 1.702 m (5' 7\"), weight 105 kg (231 lb 6.4 oz), SpO2 97 %.  Constitutional: Body mass index is 36.24 kg/m .  Alert, no acute distress, oriented, conversant  Eyes: no scleral icterus; extraocular muscles intact, moist conjunctivae  Neck: trachea midline, no thyromegaly  Ears/nose/mouth: throat/mouth:normal, good dentition  Respiratory: no respiratory distress, or pursed lip breathing  Cardiovascular: pulses strong and intact; no obvious jugular venous distension present  Gastrointestinal: soft, nontender, no organomegaly or masses. Right-sided mass not clearly palpable.  Lymphatics: No inguinal adenopathy  Musculoskeltal: extremities normal, no peripheral edema  Skin: no suspicious lesions or rashes  Neuro: Alert, oriented, speech and mentation normal  Psych: affect and mood normal, alert and oriented to person, place and time  Gait: Normal  : Bilateral testes descended and palpable with no lesions or tenderness. Right varicocele noted, Grade III.      Labs and Pathology:    I reviewed all applicable laboratory and pathology data and went over findings with patient  Significant for   Lab Results   Component Value Date    CR 1.41 08/17/2018    CR 1.92 12/15/2017    CR 1.31 05/23/2013       Imaging:    I reviewed all applicable imaging and went over findings with patient.    CT abd/pelvis 4/16/2019  IMPRESSION:  1. Enhancing 8 cm mass in the right kidney is a neoplasm likely representing renal cell " carcinoma.   2. Indeterminate 7 mm pulmonary nodule in the right lower lobe.   3. No other signs of metastatic disease or other significant finding in the lower chest, abdomen or pelvis.      Outside and Past Medical records:    I spent 10 minutes reviewing outside and past medical records.         Assessment and Plan:     Assessment: 54 year old male with newly discovered right renal mass, suspicious for renal cell carcinoma. We had an extensive discussion about the significance of a localized, enhancing kidney mass.  We discussed that approximately 80% of enhancing renal masses turn out to be kidney cancer upon removal, while 20% are found to be benign.  Although certain features such as size, gender and tumor characteristics can change these risks. Given size and appearance of this mass, it likely represents renal cell carcinoma.    We discussed the role of renal mass biopsy including the fact that renal mass biopsy is very safe with current techniques (risk of tumor seeding far below 1%).  Though renal mass biopsy is usually quite accurate 90-95% of the time, it can be inaccurate and it can be non-diagnostic.  Furthermore, the majority of patients find they just need to proceed to surgery anyway.     We discussed the options for treatment of a localized kidney mass including active surveillance, thermal ablation, and surgical extirpation. Given size of this mass and location, surveillance and ablation are not appropriate options.    Furthermore, we discussed the relative merits of partial nephrectomy vs. radical nephrectomy and the theoretic basis behind maximal nephron preservation.  Mass is not amenable to partial nephrectomy. We also discussed the advantages and disadvantages and roles of open surgery vs. laparoscopic (and Da Jesse assisted) surgery.    Risks and benefits of laparoscopic nephrectomy were discussed in details. Risks of surgery including bleeding, infection, MI, stroke, DVT/PE, bowel injury and  injury to other surrounding structures were discussed. In addition, we discussed potential for positive surgical margins,vascular injury, and potential need for conversion to an open procedure.  All questions were answered.  A written informed consent will be finalized on the morning of the procedure. The risks, benefits, alternatives, and personnel involved in the procedure were discussed. The anticipated post-operative course was explained.    Patient states he has had chest CT, and will obtain these images for me to review, to allow for completion of staging.    Plan:  Schedule for right laparoscopic nephrectomy    Orders  Orders Placed This Encounter   Procedures     Christy-Operative Worksheet  (Urology General)     Lino Esparza MD  Urology  [facility]  Clinic Phone [phone]

## 2019-04-24 NOTE — NURSING NOTE
"Oncology Rooming Note    April 24, 2019 1:45 PM   Grayson Nam is a 54 year old male who presents for:    Chief Complaint   Patient presents with     Oncology Clinic Visit     NEW PATIENT CONSULT     Initial Vitals: /87   Pulse 89   Temp 98.9  F (37.2  C) (Oral)   Resp 18   Ht 1.702 m (5' 7\")   Wt 105 kg (231 lb 6.4 oz)   SpO2 97%   BMI 36.24 kg/m   Estimated body mass index is 36.24 kg/m  as calculated from the following:    Height as of this encounter: 1.702 m (5' 7\").    Weight as of this encounter: 105 kg (231 lb 6.4 oz). Body surface area is 2.23 meters squared.  Mild Pain (3) Comment: Data Unavailable   No LMP for male patient.  Allergies reviewed: Yes  Medications reviewed: Yes    Medications: Medication refills not needed today.  Pharmacy [facility] entered into [facility]: [facility] [address] [place] [address] [address] [place] [facility] [place] [facility] [address]    Clinical concerns: new patient consult  - scrotum issue      Lima Nice CMA              "

## 2019-04-26 ENCOUNTER — TRANSFERRED RECORDS (OUTPATIENT)
Dept: HEALTH INFORMATION MANAGEMENT | Facility: CLINIC | Age: 54
End: 2019-04-26

## 2019-05-01 ENCOUNTER — ANESTHESIA EVENT (OUTPATIENT)
Dept: SURGERY | Facility: CLINIC | Age: 54
DRG: 657 | End: 2019-05-01
Payer: COMMERCIAL

## 2019-05-01 ENCOUNTER — ANESTHESIA (OUTPATIENT)
Dept: SURGERY | Facility: CLINIC | Age: 54
DRG: 657 | End: 2019-05-01
Payer: COMMERCIAL

## 2019-05-01 ENCOUNTER — HOSPITAL ENCOUNTER (INPATIENT)
Facility: CLINIC | Age: 54
LOS: 3 days | Discharge: HOME | DRG: 657 | End: 2019-05-04
Attending: UROLOGY | Admitting: UROLOGY
Payer: COMMERCIAL

## 2019-05-01 DIAGNOSIS — N28.89 RIGHT RENAL MASS: ICD-10-CM

## 2019-05-01 DIAGNOSIS — N18.2 CKD (CHRONIC KIDNEY DISEASE) STAGE 2, GFR 60-89 ML/MIN: Primary | ICD-10-CM

## 2019-05-01 LAB
ABO + RH BLD: NORMAL
ABO + RH BLD: NORMAL
ANION GAP SERPL CALCULATED.3IONS-SCNC: 8 MMOL/L (ref 3–14)
BLD GP AB SCN SERPL QL: NORMAL
BLOOD BANK CMNT PATIENT-IMP: NORMAL
BUN SERPL-MCNC: 16 MG/DL (ref 7–30)
CALCIUM SERPL-MCNC: 8.4 MG/DL (ref 8.5–10.1)
CHLORIDE SERPL-SCNC: 107 MMOL/L (ref 94–109)
CO2 SERPL-SCNC: 27 MMOL/L (ref 20–32)
CREAT SERPL-MCNC: 1.56 MG/DL (ref 0.66–1.25)
GFR SERPL CREATININE-BSD FRML MDRD: 49 ML/MIN/{1.73_M2}
GLUCOSE SERPL-MCNC: 140 MG/DL (ref 70–99)
POTASSIUM SERPL-SCNC: 4 MMOL/L (ref 3.4–5.3)
POTASSIUM SERPL-SCNC: 4.1 MMOL/L (ref 3.4–5.3)
SODIUM SERPL-SCNC: 142 MMOL/L (ref 133–144)
SPECIMEN EXP DATE BLD: NORMAL

## 2019-05-01 PROCEDURE — 25000128 H RX IP 250 OP 636: Performed by: NURSE ANESTHETIST, CERTIFIED REGISTERED

## 2019-05-01 PROCEDURE — 88341 IMHCHEM/IMCYTCHM EA ADD ANTB: CPT | Performed by: UROLOGY

## 2019-05-01 PROCEDURE — 36000093 ZZH SURGERY LEVEL 4 1ST 30 MIN: Performed by: UROLOGY

## 2019-05-01 PROCEDURE — 25000132 ZZH RX MED GY IP 250 OP 250 PS 637: Performed by: ANESTHESIOLOGY

## 2019-05-01 PROCEDURE — 25800025 ZZH RX 258: Performed by: UROLOGY

## 2019-05-01 PROCEDURE — 37000008 ZZH ANESTHESIA TECHNICAL FEE, 1ST 30 MIN: Performed by: UROLOGY

## 2019-05-01 PROCEDURE — 84132 ASSAY OF SERUM POTASSIUM: CPT | Performed by: ANESTHESIOLOGY

## 2019-05-01 PROCEDURE — 25000125 ZZHC RX 250: Performed by: NURSE ANESTHETIST, CERTIFIED REGISTERED

## 2019-05-01 PROCEDURE — 37000009 ZZH ANESTHESIA TECHNICAL FEE, EACH ADDTL 15 MIN: Performed by: UROLOGY

## 2019-05-01 PROCEDURE — 80048 BASIC METABOLIC PNL TOTAL CA: CPT | Performed by: UROLOGY

## 2019-05-01 PROCEDURE — 40000170 ZZH STATISTIC PRE-PROCEDURE ASSESSMENT II: Performed by: UROLOGY

## 2019-05-01 PROCEDURE — 25000132 ZZH RX MED GY IP 250 OP 250 PS 637: Performed by: NURSE ANESTHETIST, CERTIFIED REGISTERED

## 2019-05-01 PROCEDURE — 25800030 ZZH RX IP 258 OP 636: Performed by: UROLOGY

## 2019-05-01 PROCEDURE — 25000125 ZZHC RX 250: Performed by: UROLOGY

## 2019-05-01 PROCEDURE — 88307 TISSUE EXAM BY PATHOLOGIST: CPT | Performed by: UROLOGY

## 2019-05-01 PROCEDURE — 71000012 ZZH RECOVERY PHASE 1 LEVEL 1 FIRST HR: Performed by: UROLOGY

## 2019-05-01 PROCEDURE — 36415 COLL VENOUS BLD VENIPUNCTURE: CPT | Performed by: ANESTHESIOLOGY

## 2019-05-01 PROCEDURE — 36415 COLL VENOUS BLD VENIPUNCTURE: CPT | Performed by: UROLOGY

## 2019-05-01 PROCEDURE — 25000132 ZZH RX MED GY IP 250 OP 250 PS 637: Performed by: UROLOGY

## 2019-05-01 PROCEDURE — 86901 BLOOD TYPING SEROLOGIC RH(D): CPT | Performed by: ANESTHESIOLOGY

## 2019-05-01 PROCEDURE — 25800030 ZZH RX IP 258 OP 636: Performed by: ANESTHESIOLOGY

## 2019-05-01 PROCEDURE — 25800030 ZZH RX IP 258 OP 636: Performed by: NURSE ANESTHETIST, CERTIFIED REGISTERED

## 2019-05-01 PROCEDURE — 25000566 ZZH SEVOFLURANE, EA 15 MIN: Performed by: UROLOGY

## 2019-05-01 PROCEDURE — 36000063 ZZH SURGERY LEVEL 4 EA 15 ADDTL MIN: Performed by: UROLOGY

## 2019-05-01 PROCEDURE — 12000000 ZZH R&B MED SURG/OB

## 2019-05-01 PROCEDURE — 86850 RBC ANTIBODY SCREEN: CPT | Performed by: ANESTHESIOLOGY

## 2019-05-01 PROCEDURE — 25000128 H RX IP 250 OP 636: Performed by: UROLOGY

## 2019-05-01 PROCEDURE — 88342 IMHCHEM/IMCYTCHM 1ST ANTB: CPT | Mod: 26 | Performed by: UROLOGY

## 2019-05-01 PROCEDURE — 50545 LAPARO RADICAL NEPHRECTOMY: CPT | Mod: RT | Performed by: UROLOGY

## 2019-05-01 PROCEDURE — 85027 COMPLETE CBC AUTOMATED: CPT | Performed by: UROLOGY

## 2019-05-01 PROCEDURE — 88342 IMHCHEM/IMCYTCHM 1ST ANTB: CPT | Performed by: UROLOGY

## 2019-05-01 PROCEDURE — 0TT00ZZ RESECTION OF RIGHT KIDNEY, OPEN APPROACH: ICD-10-PCS | Performed by: UROLOGY

## 2019-05-01 PROCEDURE — 86900 BLOOD TYPING SEROLOGIC ABO: CPT | Performed by: ANESTHESIOLOGY

## 2019-05-01 PROCEDURE — 27210794 ZZH OR GENERAL SUPPLY STERILE: Performed by: UROLOGY

## 2019-05-01 RX ORDER — PROCHLORPERAZINE MALEATE 10 MG
10 TABLET ORAL EVERY 6 HOURS PRN
Status: DISCONTINUED | OUTPATIENT
Start: 2019-05-01 | End: 2019-05-04 | Stop reason: HOSPADM

## 2019-05-01 RX ORDER — MAGNESIUM HYDROXIDE 1200 MG/15ML
LIQUID ORAL PRN
Status: DISCONTINUED | OUTPATIENT
Start: 2019-05-01 | End: 2019-05-01 | Stop reason: HOSPADM

## 2019-05-01 RX ORDER — ONDANSETRON 4 MG/1
4 TABLET, ORALLY DISINTEGRATING ORAL EVERY 6 HOURS PRN
Status: DISCONTINUED | OUTPATIENT
Start: 2019-05-01 | End: 2019-05-04 | Stop reason: HOSPADM

## 2019-05-01 RX ORDER — ALBUTEROL SULFATE 90 UG/1
1-2 AEROSOL, METERED RESPIRATORY (INHALATION) EVERY 6 HOURS PRN
Status: DISCONTINUED | OUTPATIENT
Start: 2019-05-01 | End: 2019-05-04 | Stop reason: HOSPADM

## 2019-05-01 RX ORDER — ALBUTEROL SULFATE 0.83 MG/ML
2.5 SOLUTION RESPIRATORY (INHALATION) EVERY 4 HOURS PRN
Status: DISCONTINUED | OUTPATIENT
Start: 2019-05-01 | End: 2019-05-01 | Stop reason: HOSPADM

## 2019-05-01 RX ORDER — FENTANYL CITRATE 50 UG/ML
25-50 INJECTION, SOLUTION INTRAMUSCULAR; INTRAVENOUS
Status: DISCONTINUED | OUTPATIENT
Start: 2019-05-01 | End: 2019-05-01 | Stop reason: HOSPADM

## 2019-05-01 RX ORDER — LIDOCAINE HYDROCHLORIDE 20 MG/ML
INJECTION, SOLUTION INFILTRATION; PERINEURAL PRN
Status: DISCONTINUED | OUTPATIENT
Start: 2019-05-01 | End: 2019-05-01

## 2019-05-01 RX ORDER — ACETAMINOPHEN 325 MG/1
650 TABLET ORAL EVERY 4 HOURS PRN
Status: DISCONTINUED | OUTPATIENT
Start: 2019-05-04 | End: 2019-05-04 | Stop reason: HOSPADM

## 2019-05-01 RX ORDER — ERGOCALCIFEROL 1.25 MG/1
50000 CAPSULE, LIQUID FILLED ORAL WEEKLY
COMMUNITY

## 2019-05-01 RX ORDER — HYDRALAZINE HYDROCHLORIDE 20 MG/ML
2.5-5 INJECTION INTRAMUSCULAR; INTRAVENOUS EVERY 10 MIN PRN
Status: DISCONTINUED | OUTPATIENT
Start: 2019-05-01 | End: 2019-05-01 | Stop reason: HOSPADM

## 2019-05-01 RX ORDER — SODIUM CHLORIDE, SODIUM LACTATE, POTASSIUM CHLORIDE, CALCIUM CHLORIDE 600; 310; 30; 20 MG/100ML; MG/100ML; MG/100ML; MG/100ML
INJECTION, SOLUTION INTRAVENOUS CONTINUOUS
Status: DISCONTINUED | OUTPATIENT
Start: 2019-05-01 | End: 2019-05-01 | Stop reason: HOSPADM

## 2019-05-01 RX ORDER — LOSARTAN POTASSIUM 25 MG/1
25 TABLET ORAL EVERY EVENING
Status: DISCONTINUED | OUTPATIENT
Start: 2019-05-01 | End: 2019-05-04 | Stop reason: HOSPADM

## 2019-05-01 RX ORDER — GLYCOPYRROLATE 0.2 MG/ML
INJECTION, SOLUTION INTRAMUSCULAR; INTRAVENOUS PRN
Status: DISCONTINUED | OUTPATIENT
Start: 2019-05-01 | End: 2019-05-01

## 2019-05-01 RX ORDER — PROPOFOL 10 MG/ML
INJECTION, EMULSION INTRAVENOUS PRN
Status: DISCONTINUED | OUTPATIENT
Start: 2019-05-01 | End: 2019-05-01

## 2019-05-01 RX ORDER — BUPIVACAINE HYDROCHLORIDE 5 MG/ML
INJECTION, SOLUTION PERINEURAL PRN
Status: DISCONTINUED | OUTPATIENT
Start: 2019-05-01 | End: 2019-05-01 | Stop reason: HOSPADM

## 2019-05-01 RX ORDER — CHLORAL HYDRATE 500 MG
2 CAPSULE ORAL 2 TIMES DAILY
COMMUNITY
End: 2019-12-06

## 2019-05-01 RX ORDER — LOSARTAN POTASSIUM 50 MG/1
50 TABLET ORAL EVERY MORNING
Status: DISCONTINUED | OUTPATIENT
Start: 2019-05-02 | End: 2019-05-04 | Stop reason: HOSPADM

## 2019-05-01 RX ORDER — NALOXONE HYDROCHLORIDE 0.4 MG/ML
.1-.4 INJECTION, SOLUTION INTRAMUSCULAR; INTRAVENOUS; SUBCUTANEOUS
Status: DISCONTINUED | OUTPATIENT
Start: 2019-05-01 | End: 2019-05-04 | Stop reason: HOSPADM

## 2019-05-01 RX ORDER — ALBUTEROL SULFATE 90 UG/1
1-2 AEROSOL, METERED RESPIRATORY (INHALATION) EVERY 6 HOURS PRN
COMMUNITY

## 2019-05-01 RX ORDER — ALBUTEROL SULFATE 90 UG/1
AEROSOL, METERED RESPIRATORY (INHALATION) PRN
Status: DISCONTINUED | OUTPATIENT
Start: 2019-05-01 | End: 2019-05-01

## 2019-05-01 RX ORDER — FENTANYL CITRATE 50 UG/ML
INJECTION, SOLUTION INTRAMUSCULAR; INTRAVENOUS PRN
Status: DISCONTINUED | OUTPATIENT
Start: 2019-05-01 | End: 2019-05-01

## 2019-05-01 RX ORDER — NALOXONE HYDROCHLORIDE 0.4 MG/ML
.1-.4 INJECTION, SOLUTION INTRAMUSCULAR; INTRAVENOUS; SUBCUTANEOUS
Status: DISCONTINUED | OUTPATIENT
Start: 2019-05-01 | End: 2019-05-01

## 2019-05-01 RX ORDER — CEFAZOLIN SODIUM 2 G/100ML
2 INJECTION, SOLUTION INTRAVENOUS
Status: COMPLETED | OUTPATIENT
Start: 2019-05-01 | End: 2019-05-01

## 2019-05-01 RX ORDER — SODIUM CHLORIDE, SODIUM LACTATE, POTASSIUM CHLORIDE, CALCIUM CHLORIDE 600; 310; 30; 20 MG/100ML; MG/100ML; MG/100ML; MG/100ML
INJECTION, SOLUTION INTRAVENOUS CONTINUOUS PRN
Status: DISCONTINUED | OUTPATIENT
Start: 2019-05-01 | End: 2019-05-01

## 2019-05-01 RX ORDER — ONDANSETRON 2 MG/ML
INJECTION INTRAMUSCULAR; INTRAVENOUS PRN
Status: DISCONTINUED | OUTPATIENT
Start: 2019-05-01 | End: 2019-05-01

## 2019-05-01 RX ORDER — SODIUM CHLORIDE 9 MG/ML
INJECTION, SOLUTION INTRAVENOUS CONTINUOUS
Status: DISCONTINUED | OUTPATIENT
Start: 2019-05-01 | End: 2019-05-02

## 2019-05-01 RX ORDER — HYDROMORPHONE HYDROCHLORIDE 1 MG/ML
.3-.5 INJECTION, SOLUTION INTRAMUSCULAR; INTRAVENOUS; SUBCUTANEOUS
Status: DISCONTINUED | OUTPATIENT
Start: 2019-05-01 | End: 2019-05-04 | Stop reason: HOSPADM

## 2019-05-01 RX ORDER — ONDANSETRON 2 MG/ML
4 INJECTION INTRAMUSCULAR; INTRAVENOUS EVERY 30 MIN PRN
Status: DISCONTINUED | OUTPATIENT
Start: 2019-05-01 | End: 2019-05-01 | Stop reason: HOSPADM

## 2019-05-01 RX ORDER — BUPROPION HYDROCHLORIDE 150 MG/1
150 TABLET, EXTENDED RELEASE ORAL 2 TIMES DAILY
Status: DISCONTINUED | OUTPATIENT
Start: 2019-05-01 | End: 2019-05-04 | Stop reason: HOSPADM

## 2019-05-01 RX ORDER — NEOSTIGMINE METHYLSULFATE 1 MG/ML
VIAL (ML) INJECTION PRN
Status: DISCONTINUED | OUTPATIENT
Start: 2019-05-01 | End: 2019-05-01

## 2019-05-01 RX ORDER — ACETAMINOPHEN 325 MG/1
975 TABLET ORAL EVERY 8 HOURS
Status: COMPLETED | OUTPATIENT
Start: 2019-05-01 | End: 2019-05-04

## 2019-05-01 RX ORDER — CALCIUM CARBONATE 500 MG/1
1000 TABLET, CHEWABLE ORAL 4 TIMES DAILY PRN
Status: DISCONTINUED | OUTPATIENT
Start: 2019-05-01 | End: 2019-05-04 | Stop reason: HOSPADM

## 2019-05-01 RX ORDER — ONDANSETRON 2 MG/ML
4 INJECTION INTRAMUSCULAR; INTRAVENOUS EVERY 6 HOURS PRN
Status: DISCONTINUED | OUTPATIENT
Start: 2019-05-01 | End: 2019-05-04 | Stop reason: HOSPADM

## 2019-05-01 RX ORDER — HYDROMORPHONE HYDROCHLORIDE 1 MG/ML
.3-.5 INJECTION, SOLUTION INTRAMUSCULAR; INTRAVENOUS; SUBCUTANEOUS EVERY 5 MIN PRN
Status: DISCONTINUED | OUTPATIENT
Start: 2019-05-01 | End: 2019-05-01 | Stop reason: HOSPADM

## 2019-05-01 RX ORDER — GABAPENTIN 300 MG/1
300 CAPSULE ORAL ONCE
Status: COMPLETED | OUTPATIENT
Start: 2019-05-01 | End: 2019-05-01

## 2019-05-01 RX ORDER — BUPROPION HYDROCHLORIDE 150 MG/1
150 TABLET, EXTENDED RELEASE ORAL 2 TIMES DAILY
COMMUNITY
End: 2019-12-06

## 2019-05-01 RX ORDER — OXYCODONE HYDROCHLORIDE 5 MG/1
5-10 TABLET ORAL
Status: DISCONTINUED | OUTPATIENT
Start: 2019-05-01 | End: 2019-05-04 | Stop reason: HOSPADM

## 2019-05-01 RX ORDER — LIDOCAINE 40 MG/G
CREAM TOPICAL
Status: DISCONTINUED | OUTPATIENT
Start: 2019-05-01 | End: 2019-05-04 | Stop reason: HOSPADM

## 2019-05-01 RX ORDER — ACETAMINOPHEN 500 MG
1000 TABLET ORAL ONCE
Status: COMPLETED | OUTPATIENT
Start: 2019-05-01 | End: 2019-05-01

## 2019-05-01 RX ORDER — PROPOFOL 10 MG/ML
INJECTION, EMULSION INTRAVENOUS CONTINUOUS PRN
Status: DISCONTINUED | OUTPATIENT
Start: 2019-05-01 | End: 2019-05-01

## 2019-05-01 RX ORDER — CEFAZOLIN SODIUM 1 G/3ML
1 INJECTION, POWDER, FOR SOLUTION INTRAMUSCULAR; INTRAVENOUS SEE ADMIN INSTRUCTIONS
Status: DISCONTINUED | OUTPATIENT
Start: 2019-05-01 | End: 2019-05-01 | Stop reason: HOSPADM

## 2019-05-01 RX ORDER — AMOXICILLIN 250 MG
2 CAPSULE ORAL 2 TIMES DAILY
Status: DISCONTINUED | OUTPATIENT
Start: 2019-05-01 | End: 2019-05-04 | Stop reason: HOSPADM

## 2019-05-01 RX ORDER — HYDROXYZINE HYDROCHLORIDE 25 MG/1
25 TABLET, FILM COATED ORAL ONCE
Status: COMPLETED | OUTPATIENT
Start: 2019-05-01 | End: 2019-05-01

## 2019-05-01 RX ORDER — DEXAMETHASONE SODIUM PHOSPHATE 4 MG/ML
INJECTION, SOLUTION INTRA-ARTICULAR; INTRALESIONAL; INTRAMUSCULAR; INTRAVENOUS; SOFT TISSUE PRN
Status: DISCONTINUED | OUTPATIENT
Start: 2019-05-01 | End: 2019-05-01

## 2019-05-01 RX ORDER — HEPARIN SODIUM 5000 [USP'U]/.5ML
5000 INJECTION, SOLUTION INTRAVENOUS; SUBCUTANEOUS EVERY 8 HOURS
Status: DISCONTINUED | OUTPATIENT
Start: 2019-05-02 | End: 2019-05-04 | Stop reason: HOSPADM

## 2019-05-01 RX ORDER — VECURONIUM BROMIDE 1 MG/ML
INJECTION, POWDER, LYOPHILIZED, FOR SOLUTION INTRAVENOUS PRN
Status: DISCONTINUED | OUTPATIENT
Start: 2019-05-01 | End: 2019-05-01

## 2019-05-01 RX ORDER — AMOXICILLIN 250 MG
1 CAPSULE ORAL 2 TIMES DAILY
Status: DISCONTINUED | OUTPATIENT
Start: 2019-05-01 | End: 2019-05-04 | Stop reason: HOSPADM

## 2019-05-01 RX ORDER — ONDANSETRON 4 MG/1
4 TABLET, ORALLY DISINTEGRATING ORAL EVERY 30 MIN PRN
Status: DISCONTINUED | OUTPATIENT
Start: 2019-05-01 | End: 2019-05-01 | Stop reason: HOSPADM

## 2019-05-01 RX ORDER — LOSARTAN POTASSIUM 50 MG/1
50 TABLET ORAL DAILY
COMMUNITY
End: 2019-05-22

## 2019-05-01 RX ADMIN — PHENYLEPHRINE HYDROCHLORIDE 100 MCG: 10 INJECTION, SOLUTION INTRAMUSCULAR; INTRAVENOUS; SUBCUTANEOUS at 15:13

## 2019-05-01 RX ADMIN — GLYCOPYRROLATE 1 MG: 0.2 INJECTION, SOLUTION INTRAMUSCULAR; INTRAVENOUS at 18:11

## 2019-05-01 RX ADMIN — SODIUM CHLORIDE: 9 INJECTION, SOLUTION INTRAVENOUS at 20:28

## 2019-05-01 RX ADMIN — HYDROMORPHONE HYDROCHLORIDE 0.25 MG: 1 INJECTION, SOLUTION INTRAMUSCULAR; INTRAVENOUS; SUBCUTANEOUS at 16:38

## 2019-05-01 RX ADMIN — PHENYLEPHRINE HYDROCHLORIDE 200 MCG: 10 INJECTION, SOLUTION INTRAMUSCULAR; INTRAVENOUS; SUBCUTANEOUS at 15:03

## 2019-05-01 RX ADMIN — SODIUM CHLORIDE, POTASSIUM CHLORIDE, SODIUM LACTATE AND CALCIUM CHLORIDE: 600; 310; 30; 20 INJECTION, SOLUTION INTRAVENOUS at 13:52

## 2019-05-01 RX ADMIN — SODIUM CHLORIDE, POTASSIUM CHLORIDE, SODIUM LACTATE AND CALCIUM CHLORIDE: 600; 310; 30; 20 INJECTION, SOLUTION INTRAVENOUS at 15:40

## 2019-05-01 RX ADMIN — ACETAMINOPHEN 1000 MG: 500 TABLET, FILM COATED ORAL at 14:45

## 2019-05-01 RX ADMIN — LOSARTAN POTASSIUM 25 MG: 25 TABLET ORAL at 21:56

## 2019-05-01 RX ADMIN — ACETAMINOPHEN 975 MG: 325 TABLET, FILM COATED ORAL at 21:52

## 2019-05-01 RX ADMIN — VECURONIUM BROMIDE 1 MG: 1 INJECTION, POWDER, LYOPHILIZED, FOR SOLUTION INTRAVENOUS at 17:14

## 2019-05-01 RX ADMIN — BUPROPION HYDROCHLORIDE 150 MG: 150 TABLET, EXTENDED RELEASE ORAL at 21:53

## 2019-05-01 RX ADMIN — HYDROMORPHONE HYDROCHLORIDE 0.25 MG: 1 INJECTION, SOLUTION INTRAMUSCULAR; INTRAVENOUS; SUBCUTANEOUS at 16:33

## 2019-05-01 RX ADMIN — SENNOSIDES AND DOCUSATE SODIUM 2 TABLET: 8.6; 5 TABLET ORAL at 21:53

## 2019-05-01 RX ADMIN — VECURONIUM BROMIDE 1 MG: 1 INJECTION, POWDER, LYOPHILIZED, FOR SOLUTION INTRAVENOUS at 15:43

## 2019-05-01 RX ADMIN — SODIUM CHLORIDE, POTASSIUM CHLORIDE, SODIUM LACTATE AND CALCIUM CHLORIDE: 600; 310; 30; 20 INJECTION, SOLUTION INTRAVENOUS at 14:56

## 2019-05-01 RX ADMIN — VECURONIUM BROMIDE 2 MG: 1 INJECTION, POWDER, LYOPHILIZED, FOR SOLUTION INTRAVENOUS at 16:50

## 2019-05-01 RX ADMIN — DEXMEDETOMIDINE HYDROCHLORIDE 8 MCG: 100 INJECTION, SOLUTION INTRAVENOUS at 16:46

## 2019-05-01 RX ADMIN — RANITIDINE 150 MG: 150 TABLET ORAL at 21:53

## 2019-05-01 RX ADMIN — PHENYLEPHRINE HYDROCHLORIDE 100 MCG: 10 INJECTION, SOLUTION INTRAMUSCULAR; INTRAVENOUS; SUBCUTANEOUS at 15:20

## 2019-05-01 RX ADMIN — PHENYLEPHRINE HYDROCHLORIDE 0.3 MCG/KG/MIN: 10 INJECTION, SOLUTION INTRAMUSCULAR; INTRAVENOUS; SUBCUTANEOUS at 15:46

## 2019-05-01 RX ADMIN — ONDANSETRON 4 MG: 2 INJECTION INTRAMUSCULAR; INTRAVENOUS at 15:25

## 2019-05-01 RX ADMIN — DEXMEDETOMIDINE HYDROCHLORIDE 8 MCG: 100 INJECTION, SOLUTION INTRAVENOUS at 15:50

## 2019-05-01 RX ADMIN — CEFAZOLIN SODIUM 2 G: 2 INJECTION, SOLUTION INTRAVENOUS at 15:20

## 2019-05-01 RX ADMIN — HYDROMORPHONE HYDROCHLORIDE 0.5 MG: 1 INJECTION, SOLUTION INTRAMUSCULAR; INTRAVENOUS; SUBCUTANEOUS at 20:55

## 2019-05-01 RX ADMIN — DEXMEDETOMIDINE HYDROCHLORIDE 4 MCG: 100 INJECTION, SOLUTION INTRAVENOUS at 16:47

## 2019-05-01 RX ADMIN — PROPOFOL 25 MCG/KG/MIN: 10 INJECTION, EMULSION INTRAVENOUS at 15:10

## 2019-05-01 RX ADMIN — VECURONIUM BROMIDE 3 MG: 1 INJECTION, POWDER, LYOPHILIZED, FOR SOLUTION INTRAVENOUS at 15:40

## 2019-05-01 RX ADMIN — HYDROXYZINE HYDROCHLORIDE 25 MG: 25 TABLET ORAL at 14:45

## 2019-05-01 RX ADMIN — ROCURONIUM BROMIDE 50 MG: 10 INJECTION INTRAVENOUS at 14:57

## 2019-05-01 RX ADMIN — LIDOCAINE HYDROCHLORIDE 100 MG: 20 INJECTION, SOLUTION INFILTRATION; PERINEURAL at 14:57

## 2019-05-01 RX ADMIN — ONDANSETRON 4 MG: 2 INJECTION INTRAMUSCULAR; INTRAVENOUS at 18:11

## 2019-05-01 RX ADMIN — CEFAZOLIN SODIUM 1 G: 2 INJECTION, SOLUTION INTRAVENOUS at 17:20

## 2019-05-01 RX ADMIN — FENTANYL CITRATE 100 MCG: 50 INJECTION, SOLUTION INTRAMUSCULAR; INTRAVENOUS at 14:57

## 2019-05-01 RX ADMIN — PHENYLEPHRINE HYDROCHLORIDE 100 MCG: 10 INJECTION, SOLUTION INTRAMUSCULAR; INTRAVENOUS; SUBCUTANEOUS at 15:32

## 2019-05-01 RX ADMIN — PHENYLEPHRINE HYDROCHLORIDE 100 MCG: 10 INJECTION, SOLUTION INTRAMUSCULAR; INTRAVENOUS; SUBCUTANEOUS at 15:39

## 2019-05-01 RX ADMIN — NEOSTIGMINE METHYLSULFATE 5 MG: 1 INJECTION, SOLUTION INTRAVENOUS at 18:11

## 2019-05-01 RX ADMIN — PHENYLEPHRINE HYDROCHLORIDE 100 MCG: 10 INJECTION, SOLUTION INTRAMUSCULAR; INTRAVENOUS; SUBCUTANEOUS at 15:44

## 2019-05-01 RX ADMIN — VECURONIUM BROMIDE 3 MG: 1 INJECTION, POWDER, LYOPHILIZED, FOR SOLUTION INTRAVENOUS at 16:34

## 2019-05-01 RX ADMIN — PROPOFOL 150 MG: 10 INJECTION, EMULSION INTRAVENOUS at 14:57

## 2019-05-01 RX ADMIN — ALBUTEROL SULFATE 6 PUFF: 90 AEROSOL, METERED RESPIRATORY (INHALATION) at 18:14

## 2019-05-01 RX ADMIN — MIDAZOLAM 2 MG: 1 INJECTION INTRAMUSCULAR; INTRAVENOUS at 14:52

## 2019-05-01 RX ADMIN — DEXAMETHASONE SODIUM PHOSPHATE 4 MG: 4 INJECTION, SOLUTION INTRA-ARTICULAR; INTRALESIONAL; INTRAMUSCULAR; INTRAVENOUS; SOFT TISSUE at 15:30

## 2019-05-01 RX ADMIN — SODIUM CHLORIDE, POTASSIUM CHLORIDE, SODIUM LACTATE AND CALCIUM CHLORIDE: 600; 310; 30; 20 INJECTION, SOLUTION INTRAVENOUS at 17:13

## 2019-05-01 RX ADMIN — GABAPENTIN 300 MG: 300 CAPSULE ORAL at 14:45

## 2019-05-01 RX ADMIN — ONDANSETRON 4 MG: 2 INJECTION INTRAMUSCULAR; INTRAVENOUS at 20:55

## 2019-05-01 ASSESSMENT — LIFESTYLE VARIABLES: TOBACCO_USE: 0

## 2019-05-01 ASSESSMENT — COPD QUESTIONNAIRES: COPD: 0

## 2019-05-01 ASSESSMENT — ACTIVITIES OF DAILY LIVING (ADL): ADLS_ACUITY_SCORE: 11

## 2019-05-01 ASSESSMENT — ENCOUNTER SYMPTOMS
DYSRHYTHMIAS: 0
SEIZURES: 0

## 2019-05-01 ASSESSMENT — MIFFLIN-ST. JEOR: SCORE: 1830.38

## 2019-05-01 NOTE — OP NOTE
OPERATIVE REPORT  5/1/2019     PREOPERATIVE DIAGNOSIS:  Right renal mass    POSTOPERATIVE DIAGNOSIS:  Right renal mass    PROCEDURES PERFORMED:   1. Right laparoscopic radical nephrectomy    STAFF SURGEON: Dr. Lino Esparza MD   ASSISTANT(S): Kirt Martines MD  ANESTHESIA: General    ESTIMATED BLOOD LOSS: 25  cc  DRAINS/TUBES: 16Fr siegel catheter    IV FLUIDS: Please see anesthesia record  COMPLICATIONS: None.   SPECIMEN:  ID Type Source Tests Collected by Time Destination   A : RIGHT KIDNEY Tissue Kidney, Right SURGICAL PATHOLOGY EXAM Lino Esparza MD 5/1/2019  5:33 PM        SIGNIFICANT FINDINGS:   1.  Gross examination of the kidney showed renal mass without significant involvement of surrounding organs.    BRIEF OPERATIVE INDICATIONS: Garyson Nam is a(n) 54 year old male with incidental finding of a right renal mass. After a discussion of all risks, benefits, and alternatives, the patient elected to proceed with the above stated procedure.     OPERATIVE DETAILS: Informed consent was obtained and the patient was brought to the operating room where general anesthesia was induced. She was given appropriate preoperative antibiotics. She was initially positioned modified flank position where she was prepped and draped in the standard sterile fashion.  We were careful to pad all pressure points.  We then performed a timeout, verifying the correct patient's site and procedure to be performed.     The Veress needle was inserted into the abdomen. After confirmatory drop test, the abdomen was insufflated. We then proceeded to place three 12 mm non-dilating ports to triangulate the kidney. An additional 5 mm port was placed for a liver retractor. We mobilized the colon from  the lateral aspect of the abdominal wall and reflect it medially. Gerota's fascia was then opened and the lower pole of the kidney mobilized. The gonadal vein was identified and dissected away from the kidney. The ureter was also  identified and divided with Hem-o-lock clips. Dissection was carried cephalad to the renal hilum. The following vessels were identified:  Renal Vein: 1 and Renal Artery: 1.  These were carefully dissected and freed from surrounding strctures. The artery was notable for a superior insertion point, and early branching. Given the anatomy of the hilum, the artery, while visible, was no easily accessible for clips or stapler given large vein. As such, the artery and vein were divided with a 45 mm Endo-KIRA stapler with the vein and artery taken together. A second load was used additional hilar lymphatic and vascular tissue. Dissection was then carried towards the upper pole where the remaining attachments were divided using cautery. The adrenal gland was spared. The lateral attachments until the kidney were divided and kidney was completely mobilized was completely free. At this point it was placed into a 15 mm EndoCatch bag. Hemostasis was obtained. The specimen was extracted through a modified, muscle-splitting, Song incision and sent to pathology.   The extraction incision was closed with #0 Vicryl for the fascia. The skin incisions were closed with 4-0 Monocryl. The wounds were dressed with Dermabond. The patient was then awakened and taken to the PACU in stable condition.    The patient tolerated the procedure well and there were no apparent complications. The patient  was transported to the postanesthesia care unit in stable condition.      Lino Esparza MD  Urology  [facility]  Clinic Phone [phone]

## 2019-05-01 NOTE — PROGRESS NOTES
Admission medication history interview status for the 5/1/2019  admission is complete. See EPIC admission navigator for prior to admission medications     Medication history source reliability:Good    Medication history interview source(s):Patient    Medication history resources (including written lists, pill bottles, clinic record):Patient brought a list with him- Verified Losartan with [facility] pharmacist    Primary pharmacy[facility]    Additional medication history information not noted on PTA med list :None    Time spent in this activity: 60 minutes    Prior to Admission medications    Medication Sig Last Dose Taking? Auth Provider   albuterol (PROAIR HFA/PROVENTIL HFA/VENTOLIN HFA) 108 (90 Base) MCG/ACT inhaler Inhale 1-2 puffs into the lungs every 6 hours as needed for shortness of breath / dyspnea or wheezing More than a Month at PRN Yes Reported, Patient   buPROPion (WELLBUTRIN SR) 150 MG 12 hr tablet Take 150 mg by mouth 2 times daily 5/1/2019 at 0930 Yes Reported, Patient   fish oil-omega-3 fatty acids 1000 MG capsule Take 2 g by mouth 2 times daily 4/24/2019 at PM Yes Reported, Patient   fluticasone-salmeterol (ADVAIR) 100-50 MCG/DOSE diskus inhaler Inhale 1 puff into the lungs 2 times daily as needed  More than a Month at PRN Yes Reported, Patient   losartan (COZAAR) 25 MG tablet Take 25 mg by mouth every evening  4/30/2019 at PM Yes Reported, Patient   losartan (COZAAR) 50 MG tablet Take 50 mg by mouth daily 5/1/2019 at 0930 Yes Reported, Patient   ranitidine (ZANTAC) 150 MG tablet Take 150 mg by mouth 2 times daily  5/1/2019 at 0930 Yes Reported, Patient   vitamin D2 (ERGOCALCIFEROL) 25444 units (1250 mcg) capsule Take 50,000 Units by mouth once a week 4/28/2019 at AM Yes Reported, Patient

## 2019-05-01 NOTE — ANESTHESIA PREPROCEDURE EVALUATION
Anesthesia Pre-Procedure Evaluation    Patient: Grayson Nam   MRN: [id number] : 1965          Preoperative Diagnosis: RIGHT RENAL MASS    Procedure(s):  RIGHT RADICAL LAPAROSCOPIC NEPHRECTOMY, POSSIBLE OPEN    Past Medical History:   Diagnosis Date     Adjustment disorder with anxiety      Cough variant asthma      Depressive disorder      Diverticulosis      Essential hypertension, benign     Hypertension, Benign     High cholesterol      History of blood transfusion      Kidney disease     CKD stage 3     Mixed hyperlipidemia      Proteinuria      Past Surgical History:   Procedure Laterality Date     APPENDECTOMY OPEN       COLONOSCOPY       LAPAROSCOPIC HERNIORRHAPHY INGUINAL Left 2/15/2018    Procedure: LAPAROSCOPIC HERNIORRHAPHY INGUINAL;  LAPAROSCOPIC LEFT INGUINAL HERNIA REPAIR WITH MESH;  Surgeon: Tahir Sena MD;  [facility]     Allergies   Allergen Reactions     Animal Dander      Atorvastatin      Novocain [Procaine]      Prior to Admission medications    Medication Sig Start Date End Date Taking? Authorizing Provider   albuterol (PROAIR HFA/PROVENTIL HFA/VENTOLIN HFA) 108 (90 Base) MCG/ACT inhaler Inhale 1-2 puffs into the lungs every 6 hours as needed for shortness of breath / dyspnea or wheezing   Yes Reported, Patient   buPROPion (WELLBUTRIN SR) 150 MG 12 hr tablet Take 150 mg by mouth 2 times daily   Yes Reported, Patient   fish oil-omega-3 fatty acids 1000 MG capsule Take 2 g by mouth 2 times daily   Yes Reported, Patient   fluticasone-salmeterol (ADVAIR) 100-50 MCG/DOSE diskus inhaler Inhale 1 puff into the lungs 2 times daily as needed    Yes Reported, Patient   losartan (COZAAR) 25 MG tablet Take 25 mg by mouth every evening  18  Yes Reported, Patient   losartan (COZAAR) 50 MG tablet Take 50 mg by mouth daily   Yes Reported, Patient   ranitidine (ZANTAC) 150 MG tablet Take 150 mg by mouth 2 times daily  18  Yes Reported, Patient   vitamin D2  (ERGOCALCIFEROL) 97526 units (1250 mcg) capsule Take 50,000 Units by mouth once a week   Yes Reported, Patient     ECG: NSR, no st or twave abnormalities.   Stress: Interpretation Summary    * STRESS ECHO.    * Stress Echo is negative for inducible wall motion abnormalities.    * Stress EKG is negative for ischemic changes.    * No chest pain noted.    * 10 METS and  85 % max predicted heart rate (MPHR) achieved.    * Average aerobic capacity.    * RESTING ECHO.    * The left ventricular systolic function is normal, estimated LVEF 60-65%.    * There is no hemodynamically significant disease.    Anesthesia Evaluation     . Pt has had prior anesthetic. Type: General    No history of anesthetic complications          ROS/MED HX    ENT/Pulmonary:     (+)asthma , . .   (-) tobacco use, COPD, sleep apnea and recent URI   Neurologic:      (-) seizures, CVA, Neuropathy and migraines   Cardiovascular:     (+) Dyslipidemia, hypertension----. : . . . :. .      (-) CAD, ENGLE, arrhythmias and valvular problems/murmurs   METS/Exercise Tolerance:  >4 METS   Hematologic:        (-) history of blood clots, anemia and other hematologic disorder   Musculoskeletal:        (-) arthritis   GI/Hepatic:        (-) GERD and liver disease   Renal/Genitourinary:     (+) chronic renal disease, type: CRI,    (-) nephrolithiasis   Endo:     (+) Obesity, .   (-) Type I DM, Type II DM and thyroid disease   Psychiatric:     (+) psychiatric history anxiety      Infectious Disease:        (-) Recent Fever   Malignancy:         Other:                          Physical Exam  Normal systems: cardiovascular, pulmonary and dental    Airway   Mallampati: II  TM distance: >3 FB  Neck ROM: full    Dental     Cardiovascular   Rhythm and rate: regular and normal  (-) no murmur    Pulmonary    breath sounds clear to auscultation            Lab Results   Component Value Date    WBC 6.4 08/17/2018    HGB 14.8 08/17/2018    HCT 43.0 08/17/2018     08/17/2018  "    08/17/2018    POTASSIUM 4.0 05/01/2019    CHLORIDE 109 08/17/2018    CO2 27 08/17/2018    BUN 17 08/17/2018    CR 1.41 (H) 08/17/2018     (H) 08/17/2018    JOSE 8.2 (L) 08/17/2018       Preop Vitals  BP Readings from Last 3 Encounters:   04/24/19 128/87   12/28/18 133/87   08/17/18 150/88    Pulse Readings from Last 3 Encounters:   04/24/19 89   08/17/18 96   01/29/18 78      Resp Readings from Last 3 Encounters:   04/24/19 18   08/17/18 16   02/15/18 16    SpO2 Readings from Last 3 Encounters:   04/24/19 97%   08/17/18 95%   02/15/18 95%      Temp Readings from Last 1 Encounters:   04/24/19 37.2  C (98.9  F) (Oral)    Ht Readings from Last 1 Encounters:   05/01/19 1.702 m (5' 7\")      Wt Readings from Last 1 Encounters:   05/01/19 103.7 kg (228 lb 9 oz)    Estimated body mass index is 35.8 kg/m  as calculated from the following:    Height as of this encounter: 1.702 m (5' 7\").    Weight as of this encounter: 103.7 kg (228 lb 9 oz).       Anesthesia Plan      History & Physical Review      ASA Status:  2 .    NPO Status:  > 8 hours    Plan for General and ETT with Propofol induction. Maintenance will be Balanced.    PONV prophylaxis:  Ondansetron (or other 5HT-3) and Dexamethasone or Solumedrol  Additional equipment: 2nd IV and Videolaryngoscope Pre-op tylenol, gabapentin, hydroxyzine  Background propofol infusion  Toradol  Hydromorphone  Albuterol prior to emergence        Postoperative Care  Postoperative pain management:  Multi-modal analgesia.      Consents  Anesthetic plan, risks, benefits and alternatives discussed with:  Patient..                 Phuong Stephenson MD  "

## 2019-05-01 NOTE — ANESTHESIA CARE TRANSFER NOTE
Patient: Grayson Nam    Procedure(s):  RIGHT LAPAROSCOPIC RADICAL NEPHRECTOMY    Diagnosis: RIGHT RENAL MASS  Diagnosis Additional Information: No value filed.    Anesthesia Type:   General, ETT     Note:  Airway :Face Mask  Patient transferred to:PACU  Handoff Report: Identifed the Patient, Identified the Reponsible Provider, Reviewed the pertinent medical history, Discussed the surgical course, Reviewed Intra-OP anesthesia mangement and issues during anesthesia, Set expectations for post-procedure period and Allowed opportunity for questions and acknowledgement of understanding      Vitals: (Last set prior to Anesthesia Care Transfer)    CRNA VITALS  5/1/2019 1808 - 5/1/2019 1846      5/1/2019             Pulse:  87    SpO2:  100 %    Resp Rate (observed):  21                Electronically Signed By: LENIN Yanez CRNA  May 1, 2019  6:46 PM

## 2019-05-02 LAB
ANION GAP SERPL CALCULATED.3IONS-SCNC: 8 MMOL/L (ref 3–14)
BUN SERPL-MCNC: 16 MG/DL (ref 7–30)
CALCIUM SERPL-MCNC: 8.1 MG/DL (ref 8.5–10.1)
CHLORIDE SERPL-SCNC: 107 MMOL/L (ref 94–109)
CO2 SERPL-SCNC: 24 MMOL/L (ref 20–32)
CREAT SERPL-MCNC: 1.83 MG/DL (ref 0.66–1.25)
ERYTHROCYTE [DISTWIDTH] IN BLOOD BY AUTOMATED COUNT: 12.5 % (ref 10–15)
ERYTHROCYTE [DISTWIDTH] IN BLOOD BY AUTOMATED COUNT: 12.7 % (ref 10–15)
GFR SERPL CREATININE-BSD FRML MDRD: 41 ML/MIN/{1.73_M2}
GLUCOSE SERPL-MCNC: 127 MG/DL (ref 70–99)
HCT VFR BLD AUTO: 40.3 % (ref 40–53)
HCT VFR BLD AUTO: 41.8 % (ref 40–53)
HGB BLD-MCNC: 13.8 G/DL (ref 13.3–17.7)
HGB BLD-MCNC: 14.3 G/DL (ref 13.3–17.7)
MCH RBC QN AUTO: 31.2 PG (ref 26.5–33)
MCH RBC QN AUTO: 31.4 PG (ref 26.5–33)
MCHC RBC AUTO-ENTMCNC: 34.2 G/DL (ref 31.5–36.5)
MCHC RBC AUTO-ENTMCNC: 34.2 G/DL (ref 31.5–36.5)
MCV RBC AUTO: 91 FL (ref 78–100)
MCV RBC AUTO: 92 FL (ref 78–100)
PLATELET # BLD AUTO: 284 10E9/L (ref 150–450)
PLATELET # BLD AUTO: 303 10E9/L (ref 150–450)
POTASSIUM SERPL-SCNC: 4.2 MMOL/L (ref 3.4–5.3)
RBC # BLD AUTO: 4.43 10E12/L (ref 4.4–5.9)
RBC # BLD AUTO: 4.56 10E12/L (ref 4.4–5.9)
SODIUM SERPL-SCNC: 139 MMOL/L (ref 133–144)
WBC # BLD AUTO: 12.7 10E9/L (ref 4–11)
WBC # BLD AUTO: 13.3 10E9/L (ref 4–11)

## 2019-05-02 PROCEDURE — 36415 COLL VENOUS BLD VENIPUNCTURE: CPT | Performed by: UROLOGY

## 2019-05-02 PROCEDURE — 25800030 ZZH RX IP 258 OP 636: Performed by: UROLOGY

## 2019-05-02 PROCEDURE — 25000128 H RX IP 250 OP 636: Performed by: UROLOGY

## 2019-05-02 PROCEDURE — 12000000 ZZH R&B MED SURG/OB

## 2019-05-02 PROCEDURE — 85027 COMPLETE CBC AUTOMATED: CPT | Performed by: UROLOGY

## 2019-05-02 PROCEDURE — 25000132 ZZH RX MED GY IP 250 OP 250 PS 637: Performed by: UROLOGY

## 2019-05-02 PROCEDURE — 80048 BASIC METABOLIC PNL TOTAL CA: CPT | Performed by: UROLOGY

## 2019-05-02 RX ORDER — OXYCODONE HYDROCHLORIDE 5 MG/1
5-10 TABLET ORAL
Qty: 20 TABLET | Refills: 0 | Status: SHIPPED | OUTPATIENT
Start: 2019-05-02 | End: 2019-05-22

## 2019-05-02 RX ADMIN — LOSARTAN POTASSIUM 50 MG: 50 TABLET ORAL at 08:38

## 2019-05-02 RX ADMIN — SODIUM CHLORIDE: 9 INJECTION, SOLUTION INTRAVENOUS at 13:02

## 2019-05-02 RX ADMIN — HEPARIN SODIUM 5000 UNITS: 5000 INJECTION, SOLUTION INTRAVENOUS; SUBCUTANEOUS at 22:13

## 2019-05-02 RX ADMIN — LOSARTAN POTASSIUM 25 MG: 25 TABLET ORAL at 22:14

## 2019-05-02 RX ADMIN — RANITIDINE 150 MG: 150 TABLET ORAL at 08:37

## 2019-05-02 RX ADMIN — SENNOSIDES AND DOCUSATE SODIUM 2 TABLET: 8.6; 5 TABLET ORAL at 22:13

## 2019-05-02 RX ADMIN — OXYCODONE HYDROCHLORIDE 5 MG: 5 TABLET ORAL at 04:40

## 2019-05-02 RX ADMIN — HYDROMORPHONE HYDROCHLORIDE 0.5 MG: 1 INJECTION, SOLUTION INTRAMUSCULAR; INTRAVENOUS; SUBCUTANEOUS at 00:31

## 2019-05-02 RX ADMIN — ACETAMINOPHEN 975 MG: 325 TABLET, FILM COATED ORAL at 04:40

## 2019-05-02 RX ADMIN — HEPARIN SODIUM 5000 UNITS: 5000 INJECTION, SOLUTION INTRAVENOUS; SUBCUTANEOUS at 14:03

## 2019-05-02 RX ADMIN — BUPROPION HYDROCHLORIDE 150 MG: 150 TABLET, EXTENDED RELEASE ORAL at 08:37

## 2019-05-02 RX ADMIN — OXYCODONE HYDROCHLORIDE 5 MG: 5 TABLET ORAL at 08:34

## 2019-05-02 RX ADMIN — ACETAMINOPHEN 975 MG: 325 TABLET, FILM COATED ORAL at 19:24

## 2019-05-02 RX ADMIN — OXYCODONE HYDROCHLORIDE 5 MG: 5 TABLET ORAL at 19:23

## 2019-05-02 RX ADMIN — SENNOSIDES AND DOCUSATE SODIUM 1 TABLET: 8.6; 5 TABLET ORAL at 08:37

## 2019-05-02 RX ADMIN — ACETAMINOPHEN 975 MG: 325 TABLET, FILM COATED ORAL at 12:11

## 2019-05-02 RX ADMIN — BUPROPION HYDROCHLORIDE 150 MG: 150 TABLET, EXTENDED RELEASE ORAL at 22:13

## 2019-05-02 RX ADMIN — OXYCODONE HYDROCHLORIDE 10 MG: 5 TABLET ORAL at 22:13

## 2019-05-02 RX ADMIN — SODIUM CHLORIDE: 9 INJECTION, SOLUTION INTRAVENOUS at 05:05

## 2019-05-02 RX ADMIN — OXYCODONE HYDROCHLORIDE 5 MG: 5 TABLET ORAL at 15:14

## 2019-05-02 ASSESSMENT — ACTIVITIES OF DAILY LIVING (ADL)
ADLS_ACUITY_SCORE: 11
ADLS_ACUITY_SCORE: 13
ADLS_ACUITY_SCORE: 11
ADLS_ACUITY_SCORE: 11

## 2019-05-02 ASSESSMENT — MIFFLIN-ST. JEOR: SCORE: 1295.76

## 2019-05-02 NOTE — PLAN OF CARE
Pain controlled with PRN Oxycodone 5 mg x2 and scheduled tylenol. Tolerating CL diet. Burping, no flatus yet. Ambulated in gan with SBA x 2, up in chair. Voided after siegel removed. Plan for discharge tomorrow if passing gas, tolerating diet.

## 2019-05-02 NOTE — PLAN OF CARE
A&Ox4. VSS on 2L NC, except Temp max 99.0. On capnography-WNL. L/s diminished. Lap/incisions w/ dermabond-CDI ex scant dried drainage. CMS intact. Pain managed w/ iv dilaudid, oxycodone and scheduled Tylenol. Gave iv zofran x1 for nausea w/ good relief. Hypoactive BS. No flatus yet. Bond patent-clear yellow urine. PIV infusing. IS at 2000. Urology following. Continue to monitor.

## 2019-05-02 NOTE — ANESTHESIA POSTPROCEDURE EVALUATION
Patient: Grayson Nam    Procedure(s):  RIGHT LAPAROSCOPIC RADICAL NEPHRECTOMY    Diagnosis:RIGHT RENAL MASS  Diagnosis Additional Information: No value filed.    Anesthesia Type:  General, ETT    Note:  Anesthesia Post Evaluation    Patient location during evaluation: PACU  Patient participation: Able to fully participate in evaluation  Level of consciousness: awake  Pain management: adequate  Airway patency: patent  Cardiovascular status: acceptable  Respiratory status: acceptable  Hydration status: acceptable  PONV: controlled     Anesthetic complications: None          Last vitals:  Vitals:    05/01/19 2033 05/01/19 2055 05/01/19 2058   BP: 131/73  133/74   Pulse:      Resp: 19 18 18   Temp: 35.2  C (95.3  F)     SpO2: 93%  97%         Electronically Signed By: Meagan Castro MD  May 1, 2019  9:51 PM

## 2019-05-02 NOTE — PROGRESS NOTES
Urology    No flatus  Pain controlled with oxycodone  Tolerating clears    Abd S/NT/ND  Incisions C/D/I  Urine clear  hgb 13.8    A/P: Doing well  Wait to advance diet  Ambulate

## 2019-05-03 LAB
ANION GAP SERPL CALCULATED.3IONS-SCNC: 6 MMOL/L (ref 3–14)
BUN SERPL-MCNC: 17 MG/DL (ref 7–30)
CALCIUM SERPL-MCNC: 8.7 MG/DL (ref 8.5–10.1)
CHLORIDE SERPL-SCNC: 108 MMOL/L (ref 94–109)
CO2 SERPL-SCNC: 25 MMOL/L (ref 20–32)
CREAT SERPL-MCNC: 2.02 MG/DL (ref 0.66–1.25)
CREAT SERPL-MCNC: 2.1 MG/DL (ref 0.66–1.25)
GFR SERPL CREATININE-BSD FRML MDRD: 34 ML/MIN/{1.73_M2}
GFR SERPL CREATININE-BSD FRML MDRD: 36 ML/MIN/{1.73_M2}
GLUCOSE SERPL-MCNC: 99 MG/DL (ref 70–99)
POTASSIUM SERPL-SCNC: 4.1 MMOL/L (ref 3.4–5.3)
SODIUM SERPL-SCNC: 139 MMOL/L (ref 133–144)

## 2019-05-03 PROCEDURE — 25000128 H RX IP 250 OP 636: Performed by: UROLOGY

## 2019-05-03 PROCEDURE — 36415 COLL VENOUS BLD VENIPUNCTURE: CPT | Performed by: PHYSICIAN ASSISTANT

## 2019-05-03 PROCEDURE — 80048 BASIC METABOLIC PNL TOTAL CA: CPT | Performed by: UROLOGY

## 2019-05-03 PROCEDURE — 25000132 ZZH RX MED GY IP 250 OP 250 PS 637: Performed by: PHYSICIAN ASSISTANT

## 2019-05-03 PROCEDURE — 12000000 ZZH R&B MED SURG/OB

## 2019-05-03 PROCEDURE — 25000132 ZZH RX MED GY IP 250 OP 250 PS 637: Performed by: UROLOGY

## 2019-05-03 PROCEDURE — 82565 ASSAY OF CREATININE: CPT | Performed by: PHYSICIAN ASSISTANT

## 2019-05-03 PROCEDURE — 36415 COLL VENOUS BLD VENIPUNCTURE: CPT | Performed by: UROLOGY

## 2019-05-03 RX ORDER — BISACODYL 10 MG
10 SUPPOSITORY, RECTAL RECTAL DAILY PRN
Status: DISCONTINUED | OUTPATIENT
Start: 2019-05-03 | End: 2019-05-04 | Stop reason: HOSPADM

## 2019-05-03 RX ADMIN — OXYCODONE HYDROCHLORIDE 10 MG: 5 TABLET ORAL at 02:03

## 2019-05-03 RX ADMIN — HEPARIN SODIUM 5000 UNITS: 5000 INJECTION, SOLUTION INTRAVENOUS; SUBCUTANEOUS at 05:01

## 2019-05-03 RX ADMIN — HEPARIN SODIUM 5000 UNITS: 5000 INJECTION, SOLUTION INTRAVENOUS; SUBCUTANEOUS at 22:50

## 2019-05-03 RX ADMIN — HEPARIN SODIUM 5000 UNITS: 5000 INJECTION, SOLUTION INTRAVENOUS; SUBCUTANEOUS at 15:24

## 2019-05-03 RX ADMIN — ACETAMINOPHEN 975 MG: 325 TABLET, FILM COATED ORAL at 21:38

## 2019-05-03 RX ADMIN — SENNOSIDES AND DOCUSATE SODIUM 2 TABLET: 8.6; 5 TABLET ORAL at 21:39

## 2019-05-03 RX ADMIN — LOSARTAN POTASSIUM 25 MG: 25 TABLET ORAL at 21:39

## 2019-05-03 RX ADMIN — ACETAMINOPHEN 975 MG: 325 TABLET, FILM COATED ORAL at 05:02

## 2019-05-03 RX ADMIN — Medication 10 MG: at 15:13

## 2019-05-03 RX ADMIN — BUPROPION HYDROCHLORIDE 150 MG: 150 TABLET, EXTENDED RELEASE ORAL at 08:33

## 2019-05-03 RX ADMIN — ACETAMINOPHEN 975 MG: 325 TABLET, FILM COATED ORAL at 11:52

## 2019-05-03 RX ADMIN — BUPROPION HYDROCHLORIDE 150 MG: 150 TABLET, EXTENDED RELEASE ORAL at 21:39

## 2019-05-03 RX ADMIN — SENNOSIDES AND DOCUSATE SODIUM 2 TABLET: 8.6; 5 TABLET ORAL at 08:32

## 2019-05-03 RX ADMIN — LOSARTAN POTASSIUM 50 MG: 50 TABLET ORAL at 08:32

## 2019-05-03 RX ADMIN — OXYCODONE HYDROCHLORIDE 5 MG: 5 TABLET ORAL at 15:12

## 2019-05-03 RX ADMIN — RANITIDINE 150 MG: 150 TABLET ORAL at 08:32

## 2019-05-03 RX ADMIN — OXYCODONE HYDROCHLORIDE 5 MG: 5 TABLET ORAL at 08:32

## 2019-05-03 ASSESSMENT — ACTIVITIES OF DAILY LIVING (ADL)
ADLS_ACUITY_SCORE: 11
ADLS_ACUITY_SCORE: 10
ADLS_ACUITY_SCORE: 11
ADLS_ACUITY_SCORE: 10
ADLS_ACUITY_SCORE: 11
ADLS_ACUITY_SCORE: 10

## 2019-05-03 ASSESSMENT — MIFFLIN-ST. JEOR: SCORE: 1852.33

## 2019-05-03 NOTE — PLAN OF CARE
Up with SBA, ambulate in halls. Tolerating clears. No passing flatus, abdomen distended.   Tylenol and oxy for pain.

## 2019-05-03 NOTE — PLAN OF CARE
Patient is POD 2 of right lap nephrectomy. Patient A&Ox4. VSS. Up SBA to bathroom.R PIV painful while flushing, removed IV. C/o abdominal pain, gave oxycodone x1 and on scheduled Tylenol. No flatus. Lap sites and right incision, CDI with dried drainage. Calls appropriately and family at bedside.

## 2019-05-03 NOTE — PROVIDER NOTIFICATION
Text page sent to Doctor Esparza after attempting to call the clinic:    Pt feeling constipated, received suppository at 1513, no results. Pt requesting an enema or other intervention.    Dr. Esparza called and stated pt likely has ileus, and enema is not an option at this time, ambulation and time needed.  MD spoke to pt over the phone.

## 2019-05-03 NOTE — PROVIDER NOTIFICATION
MD Notification    Notified Person: MD    Notified Person Name: Dr. Clarke    Notification Date/Time: 5/2/2019 9:27 PM     Notification Interaction:    Purpose of Notification: Pt requesting IVF stopped    Orders Received: ok to stop IVF     Comments:

## 2019-05-03 NOTE — PLAN OF CARE
Abdominal surgical pain controlled with Oxycodone 5 mg PRN and scheduled tylenol. Tolerated full liquid diet well  this AM, good bowel sounds, no flatus yet. Orders to ADAT, low fiber ordered, explained to patient to go slowly, small servings. Has been ambulating in room and gan independently, up in chair, has been out of bed since early AM. Voiding adequately, encouraged fluids. Creat level has creeping up, plan discharge home pending creat results tomorrow.

## 2019-05-03 NOTE — PROGRESS NOTES
Urology    Hungry  Pain controlled with oxycodone  Tolerated clears  Ambulating  Making good urine    Abd S/NT/ND  Incisions C/D/I  Urine clear      A/P: Doing well, POD 2 Right laparoscopic radical nephrectomy  Full liquids, YARELY  Ambulate  AURY, expected, but will follow closely. Recheck at 12pm  Dispo: Late today or early AM.    Aleah Velazquez PA-C  [facility]  [phone]

## 2019-05-04 VITALS
HEIGHT: 67 IN | TEMPERATURE: 95.9 F | RESPIRATION RATE: 14 BRPM | SYSTOLIC BLOOD PRESSURE: 126 MMHG | WEIGHT: 228.4 LBS | BODY MASS INDEX: 35.85 KG/M2 | OXYGEN SATURATION: 98 % | HEART RATE: 89 BPM | DIASTOLIC BLOOD PRESSURE: 79 MMHG

## 2019-05-04 LAB
CREAT SERPL-MCNC: 2.17 MG/DL (ref 0.66–1.25)
GFR SERPL CREATININE-BSD FRML MDRD: 33 ML/MIN/{1.73_M2}
PLATELET # BLD AUTO: 267 10E9/L (ref 150–450)

## 2019-05-04 PROCEDURE — 25000132 ZZH RX MED GY IP 250 OP 250 PS 637: Performed by: UROLOGY

## 2019-05-04 PROCEDURE — 36415 COLL VENOUS BLD VENIPUNCTURE: CPT | Performed by: UROLOGY

## 2019-05-04 PROCEDURE — 25000131 ZZH RX MED GY IP 250 OP 636 PS 637: Performed by: UROLOGY

## 2019-05-04 PROCEDURE — 25000128 H RX IP 250 OP 636: Performed by: UROLOGY

## 2019-05-04 PROCEDURE — 82565 ASSAY OF CREATININE: CPT | Performed by: UROLOGY

## 2019-05-04 PROCEDURE — 85049 AUTOMATED PLATELET COUNT: CPT | Performed by: UROLOGY

## 2019-05-04 RX ORDER — POLYETHYLENE GLYCOL 3350 17 G/17G
17 POWDER, FOR SOLUTION ORAL 2 TIMES DAILY PRN
Status: DISCONTINUED | OUTPATIENT
Start: 2019-05-04 | End: 2019-05-04 | Stop reason: HOSPADM

## 2019-05-04 RX ADMIN — ONDANSETRON 4 MG: 4 TABLET, ORALLY DISINTEGRATING ORAL at 03:41

## 2019-05-04 RX ADMIN — ACETAMINOPHEN 975 MG: 325 TABLET, FILM COATED ORAL at 12:41

## 2019-05-04 RX ADMIN — LOSARTAN POTASSIUM 50 MG: 50 TABLET ORAL at 08:53

## 2019-05-04 RX ADMIN — MAGNESIUM HYDROXIDE 30 ML: 400 SUSPENSION ORAL at 08:53

## 2019-05-04 RX ADMIN — HEPARIN SODIUM 5000 UNITS: 5000 INJECTION, SOLUTION INTRAVENOUS; SUBCUTANEOUS at 05:01

## 2019-05-04 RX ADMIN — BUPROPION HYDROCHLORIDE 150 MG: 150 TABLET, EXTENDED RELEASE ORAL at 08:53

## 2019-05-04 RX ADMIN — SENNOSIDES AND DOCUSATE SODIUM 2 TABLET: 8.6; 5 TABLET ORAL at 08:53

## 2019-05-04 RX ADMIN — ACETAMINOPHEN 975 MG: 325 TABLET, FILM COATED ORAL at 03:18

## 2019-05-04 RX ADMIN — RANITIDINE 150 MG: 150 TABLET ORAL at 08:53

## 2019-05-04 ASSESSMENT — ACTIVITIES OF DAILY LIVING (ADL)
ADLS_ACUITY_SCORE: 10

## 2019-05-04 ASSESSMENT — MIFFLIN-ST. JEOR: SCORE: 1829.65

## 2019-05-04 NOTE — PLAN OF CARE
VSS, pain well controlled, Discharge instructions reviewed with patient and spouse. OP nutrition consult placed and instructed to follow up with PMD re:labs and hospitilization. Will follow up with surgery as scheduled.

## 2019-05-04 NOTE — PLAN OF CARE
A&O. VSS. Lung sounds clear. Bowel sounds faint and hypoactive, flatus-, BM-. Pt stated the abdominal pain/fullness that he thought was constipation has started to improve. Pt educated about bowel function post surgery, encouraged to ambulate frequently, and limit diet and go slowly. Pt did not have dinner, intake of ice/water. Incisions with liquid bandage, bruising/ erythema around. Declined oxycodone for pain this shift, had scheduled tylenol and used ice pack. Up independently.

## 2019-05-04 NOTE — PLAN OF CARE
Patient is POD3 of right laparoscopic nephrectomy. Patient is A&Ox4. VSS. Denies significant pain. Up independently to bathroom. On low fiber diet, no flatus and hypoactive bowels. Patient does c/o some constipation. Patient c/o some nausea, gave Zofran x1. No PIV. 3 lap sites noted and right side incision, ecchymotic around sites noted. Possible discharge home 5/4 if bowel functions adequate.

## 2019-05-07 ENCOUNTER — DOCUMENTATION ONLY (OUTPATIENT)
Dept: OTHER | Facility: CLINIC | Age: 54
End: 2019-05-07

## 2019-05-07 NOTE — DISCHARGE SUMMARY
[facility] Discharge Summary    Patient: Grayson Nam    MRN: [id number]   : 1965         Date of Admission:  2019  Date of Discharge::  2019 12:44 PM  Admitting Physician:  Lino Esparza  Discharge Physician:  Lino Esparza             Admission Diagnoses:   Right renal mass  Past Medical History:   Diagnosis Date     Adjustment disorder with anxiety      Cough variant asthma      Depressive disorder      Diverticulosis      Essential hypertension, benign     Hypertension, Benign     High cholesterol      History of blood transfusion      Kidney disease     CKD stage 3     Mixed hyperlipidemia      Proteinuria            Discharge Diagnosis:   Right Renal Cell Carcinoma    Past Medical History:   Diagnosis Date     Adjustment disorder with anxiety      Cough variant asthma      Depressive disorder      Diverticulosis      Essential hypertension, benign     Hypertension, Benign     High cholesterol      History of blood transfusion      Kidney disease     CKD stage 3     Mixed hyperlipidemia      Proteinuria              Procedures:   Procedure(s): Right laparoscopic nephrectomy              Discharge Medications:     Discharge Medication List as of 2019 11:55 AM      START taking these medications    Details   oxyCODONE (ROXICODONE) 5 MG tablet Take 1-2 tablets (5-10 mg) by mouth every 3 hours as needed for moderate to severe pain, Disp-20 tablet, R-0, Local Print         CONTINUE these medications which have NOT CHANGED    Details   albuterol (PROAIR HFA/PROVENTIL HFA/VENTOLIN HFA) 108 (90 Base) MCG/ACT inhaler Inhale 1-2 puffs into the lungs every 6 hours as needed for shortness of breath / dyspnea or wheezing, Historical      buPROPion (WELLBUTRIN SR) 150 MG 12 hr tablet Take 150 mg by mouth 2 times daily, Historical      fish oil-omega-3 fatty acids 1000 MG capsule Take 2 g by mouth 2 times daily, Historical      fluticasone-salmeterol (ADVAIR) 100-50 MCG/DOSE diskus inhaler  Inhale 1 puff into the lungs 2 times daily as needed , Historical      !! losartan (COZAAR) 25 MG tablet Take 25 mg by mouth every evening , Historical      !! losartan (COZAAR) 50 MG tablet Take 50 mg by mouth daily, Historical      ranitidine (ZANTAC) 150 MG tablet Take 150 mg by mouth 2 times daily , Historical      vitamin D2 (ERGOCALCIFEROL) 69169 units (1250 mcg) capsule Take 50,000 Units by mouth once a week, Historical       !! - Potential duplicate medications found. Please discuss with provider.               Consultations:   No consultations were requested during this admission          Brief History of Illness:   54 year old male with incidental finding of right renal mass who presents for nephrectomy and is admitted post-op.           Hospital Course:   The patient was admitted to the hospital and underwent the above named procedures.  For specific details, please refer to the dictated operative report in the patient's chart.  In summary, the patient tolerated the procedure well and there were no intraoperative complications.  Following the procedure the patient was admitted to the floor for routine post operative care.      The patient's overall hospital course was uneventful, and they remained hemodynamically stable, and afebrile for the hospital stay.  Diet was able to be advanced as tolerated to a regular diet, which was tolerated well before discharge. Patient also had return of antegrade bowel function prior to discharging from the hospital.  Labs also remained stable.  At this time the patient was determined stable for discharge from the hospital.            Discharge Instructions and Follow-Up:   Discharge diet: Regular   Discharge activity: No lifting, or strenuous exercise for 6 week(s)   Discharge follow-up: Follow up with me in 2 weeks   Wound care: Keep wound clean and dry  May get incision wet in shower but do not soak or scrub           Discharge Disposition:   Discharged to home       Attestation: I have reviewed today's vital signs, notes, medications, labs and imaging.    Lino Esparza MD  May 7, 2019

## 2019-05-08 LAB — COPATH REPORT: NORMAL

## 2019-05-10 ENCOUNTER — PATIENT OUTREACH (OUTPATIENT)
Dept: ONCOLOGY | Facility: CLINIC | Age: 54
End: 2019-05-10

## 2019-05-10 NOTE — PROGRESS NOTES
S-(situation): Pt called regarding he received his pathology and labs which were abnormal. He was instructed to contact the surgeon to address elevated creatinine and decreased GFR. Pt wondering if nephrologist may be recommended?    B-(background): Chromophobe renal cell carcinoma.     A-(assessment): PT states he feeling better after surgery.     R-(recommendations): Communication will be sent to Dr. Esparza.     Return communication received from Dr. Esparza with response, labs are as expected. Plan to follow-up as scheduled, labs will be repeated and evaluated.     Writer contacted Pt, reassured Pt with plan, Dr. Esparza is pleased he is recovering and feeling well. No further questions of concerns. Pt agrees with plan.    Felecia Chinchilla, RICHARDN, RN, OCN  RN Cancer Care Coordinator  [facility]  [phone]

## 2019-05-22 ENCOUNTER — ONCOLOGY VISIT (OUTPATIENT)
Dept: ONCOLOGY | Facility: CLINIC | Age: 54
End: 2019-05-22
Attending: UROLOGY
Payer: COMMERCIAL

## 2019-05-22 ENCOUNTER — HOSPITAL ENCOUNTER (OUTPATIENT)
Facility: CLINIC | Age: 54
Setting detail: SPECIMEN
Discharge: HOME | End: 2019-05-22
Attending: UROLOGY | Admitting: UROLOGY
Payer: COMMERCIAL

## 2019-05-22 VITALS
HEART RATE: 77 BPM | WEIGHT: 219.2 LBS | DIASTOLIC BLOOD PRESSURE: 83 MMHG | OXYGEN SATURATION: 97 % | HEIGHT: 67 IN | BODY MASS INDEX: 34.4 KG/M2 | RESPIRATION RATE: 18 BRPM | TEMPERATURE: 98 F | SYSTOLIC BLOOD PRESSURE: 124 MMHG

## 2019-05-22 DIAGNOSIS — C64.1 RENAL CELL CARCINOMA, RIGHT (H): Primary | ICD-10-CM

## 2019-05-22 LAB
ANION GAP SERPL CALCULATED.3IONS-SCNC: 6 MMOL/L (ref 3–14)
BUN SERPL-MCNC: 20 MG/DL (ref 7–30)
CALCIUM SERPL-MCNC: 9.5 MG/DL (ref 8.5–10.1)
CHLORIDE SERPL-SCNC: 105 MMOL/L (ref 94–109)
CO2 SERPL-SCNC: 28 MMOL/L (ref 20–32)
CREAT SERPL-MCNC: 2.23 MG/DL (ref 0.66–1.25)
GFR SERPL CREATININE-BSD FRML MDRD: 32 ML/MIN/{1.73_M2}
GLUCOSE SERPL-MCNC: 90 MG/DL (ref 70–99)
POTASSIUM SERPL-SCNC: 4.6 MMOL/L (ref 3.4–5.3)
SODIUM SERPL-SCNC: 139 MMOL/L (ref 133–144)

## 2019-05-22 PROCEDURE — 99024 POSTOP FOLLOW-UP VISIT: CPT | Performed by: UROLOGY

## 2019-05-22 PROCEDURE — 36415 COLL VENOUS BLD VENIPUNCTURE: CPT

## 2019-05-22 PROCEDURE — 80048 BASIC METABOLIC PNL TOTAL CA: CPT | Performed by: UROLOGY

## 2019-05-22 PROCEDURE — G0463 HOSPITAL OUTPT CLINIC VISIT: HCPCS

## 2019-05-22 RX ORDER — AMLODIPINE BESYLATE 2.5 MG/1
2.5 TABLET ORAL AT BEDTIME
Refills: 1 | COMMUNITY
Start: 2019-05-18

## 2019-05-22 ASSESSMENT — MIFFLIN-ST. JEOR: SCORE: 1787.91

## 2019-05-22 ASSESSMENT — PAIN SCALES - GENERAL: PAINLEVEL: NO PAIN (0)

## 2019-05-22 NOTE — NURSING NOTE
"Oncology Rooming Note    May 22, 2019 1:41 PM   Grayson Nam is a 54 year old male who presents for:    Chief Complaint   Patient presents with     Oncology Clinic Visit     Right renal mass      Initial Vitals: /83   Pulse 77   Temp 98  F (36.7  C) (Oral)   Resp 18   Ht 1.702 m (5' 7\")   Wt 99.4 kg (219 lb 3.2 oz)   SpO2 97%   BMI 34.33 kg/m   Estimated body mass index is 34.33 kg/m  as calculated from the following:    Height as of this encounter: 1.702 m (5' 7\").    Weight as of this encounter: 99.4 kg (219 lb 3.2 oz). Body surface area is 2.17 meters squared.  No Pain (0) Comment: Data Unavailable   No LMP for male patient.  Allergies reviewed: Yes  Medications reviewed: Yes    Medications: Medication refills not needed today.  Pharmacy name entered into lifecake: [facility] [address] [place] [address] [address] AT [facility] [address]    Clinical concerns: f/u      Lima Nice CMA              "

## 2019-05-22 NOTE — PROGRESS NOTES
"  CHIEF COMPLAINT   It was my pleasure to see Grayson Nam who is a 54 year old male for follow-up of renal cell carcinoma.      HPI   Grayson Nam is a very pleasant 54 year old male who presents with a history of right renal cell carcinoma. He is s/p right lap nephrectomy 5/1/2019. He has recovered well, without complication. Minimal pain at this point. Overall feeling well.     Right Renal Cell Carcinoma  FINAL DIAGNOSIS:   Right kidney, radical nephrectomy   - Chromophobe renal cell carcinoma   - Tumor size: 10.0 x 7.5 x 6.5 cm   - Margins: Negative for tumor   - Renal sinus negative for tumor invasion   - No evidence of renal vein involvement   - No Lymphovascular space invasion identified   - No adrenal gland present   - pT2a, pNX     PHYSICAL EXAM  Patient is a 54 year old  male   Vitals: Blood pressure 124/83, pulse 77, temperature 98  F (36.7  C), temperature source Oral, resp. rate 18, height 1.702 m (5' 7\"), weight 99.4 kg (219 lb 3.2 oz), SpO2 97 %.  General Appearance Adult: Body mass index is 34.33 kg/m .  Alert, no acute distress, oriented  HENT: throat/mouth:normal, good dentition  Lungs: no respiratory distress, or pursed lip breathing  Heart: No obvious jugular venous distension present  Abdomen: soft, nontender, no organomegaly or masses  Incisions healing well  Back: no CVAT  Musculoskeltal: extremities normal, no peripheral edema  Skin: no suspicious lesions or rashes  Neuro: Alert, oriented, speech and mentation normal  Psych: affect and mood normal  Gait: Normal    ASSESSMENT and PLAN  54 year old male with pT2a renal cell carcinoma, chromophobe, s/p right lap radical nephrectomy 5/1/2019. We dicussed this pathologic finding in detail, and plans for subsequent disease surveillance. Given this final pathology of his tumor, we discussed that there is no standard adjuvant therapy in this setting. We discussed plans for follow-up with surveillance scans. Will plan CT, CXR, and labs " in 3 months. We also discussed this creatinine levels and that it is expected for them increase some following surgery, but that this should level out at a new baseline. For now is doing well. I offered a referral to nephrology if Cr continues to rise, but he declines at this time.    - Follow-up 3 months with CT, CXR, and labs    Lino Esparza MD  Urology  [facility]  Clinic Phone [phone]

## 2019-05-22 NOTE — LETTER
"    5/22/2019         RE: Grayson Nam  [address] [facility] [address]        Dear Colleague,    Thank you for referring your patient, Grayson Nam, to the [facility]. Please see a copy of my visit note below.      CHIEF COMPLAINT   It was my pleasure to see Grayson Nam who is a 54 year old male for follow-up of renal cell carcinoma.      HPI   Grayson Nam is a very pleasant 54 year old male who presents with a history of right renal cell carcinoma. He is s/p right lap nephrectomy 5/1/2019. He has recovered well, without complication. Minimal pain at this point. Overall feeling well.     Right Renal Cell Carcinoma  FINAL DIAGNOSIS:   Right kidney, radical nephrectomy   - Chromophobe renal cell carcinoma   - Tumor size: 10.0 x 7.5 x 6.5 cm   - Margins: Negative for tumor   - Renal sinus negative for tumor invasion   - No evidence of renal vein involvement   - No Lymphovascular space invasion identified   - No adrenal gland present   - pT2a, pNX     PHYSICAL EXAM  Patient is a 54 year old  male   Vitals: Blood pressure 124/83, pulse 77, temperature 98  F (36.7  C), temperature source Oral, resp. rate 18, height 1.702 m (5' 7\"), weight 99.4 kg (219 lb 3.2 oz), SpO2 97 %.  General Appearance Adult: Body mass index is 34.33 kg/m .  Alert, no acute distress, oriented  HENT: throat/mouth:normal, good dentition  Lungs: no respiratory distress, or pursed lip breathing  Heart: No obvious jugular venous distension present  Abdomen: soft, nontender, no organomegaly or masses  Incisions healing well  Back: no CVAT  Musculoskeltal: extremities normal, no peripheral edema  Skin: no suspicious lesions or rashes  Neuro: Alert, oriented, speech and mentation normal  Psych: affect and mood normal  Gait: Normal    ASSESSMENT and PLAN  54 year old male with pT2a renal cell carcinoma, chromophobe, s/p right lap radical nephrectomy 5/1/2019. We dicussed this pathologic " finding in detail, and plans for subsequent disease surveillance. Given this final pathology of his tumor, we discussed that there is no standard adjuvant therapy in this setting. We discussed plans for follow-up with surveillance scans. Will plan CT, CXR, and labs in 3 months. We also discussed this creatinine levels and that it is expected for them increase some following surgery, but that this should level out at a new baseline. For now is doing well. I offered a referral to nephrology if Cr continues to rise, but he declines at this time.    - Follow-up 3 months with CT, CXR, and labs    Lino Esparza MD  Urology  [facility]  Clinic Phone [phone]        Again, thank you for allowing me to participate in the care of your patient.        Sincerely,        Lino Esparza MD

## 2019-05-25 PROBLEM — C64.1 RENAL CELL CARCINOMA, RIGHT (H): Status: ACTIVE | Noted: 2019-05-25

## 2019-08-28 ENCOUNTER — HOSPITAL ENCOUNTER (OUTPATIENT)
Facility: CLINIC | Age: 54
Setting detail: SPECIMEN
End: 2019-08-28
Attending: UROLOGY
Payer: COMMERCIAL

## 2019-08-28 ENCOUNTER — HOSPITAL ENCOUNTER (OUTPATIENT)
Dept: GENERAL RADIOLOGY | Facility: CLINIC | Age: 54
Discharge: HOME | End: 2019-08-28
Attending: UROLOGY | Admitting: UROLOGY
Payer: COMMERCIAL

## 2019-08-28 ENCOUNTER — ONCOLOGY VISIT (OUTPATIENT)
Dept: ONCOLOGY | Facility: CLINIC | Age: 54
End: 2019-08-28
Attending: UROLOGY
Payer: COMMERCIAL

## 2019-08-28 ENCOUNTER — HOSPITAL ENCOUNTER (OUTPATIENT)
Dept: CT IMAGING | Facility: CLINIC | Age: 54
End: 2019-08-28
Attending: UROLOGY
Payer: COMMERCIAL

## 2019-08-28 VITALS
OXYGEN SATURATION: 94 % | DIASTOLIC BLOOD PRESSURE: 78 MMHG | TEMPERATURE: 97.5 F | HEART RATE: 67 BPM | RESPIRATION RATE: 16 BRPM | HEIGHT: 67 IN | WEIGHT: 209.8 LBS | BODY MASS INDEX: 32.93 KG/M2 | SYSTOLIC BLOOD PRESSURE: 117 MMHG

## 2019-08-28 DIAGNOSIS — C64.1 RENAL CELL CARCINOMA, RIGHT (H): ICD-10-CM

## 2019-08-28 DIAGNOSIS — C64.1 RENAL CELL CARCINOMA, RIGHT (H): Primary | ICD-10-CM

## 2019-08-28 LAB
ANION GAP SERPL CALCULATED.3IONS-SCNC: 5 MMOL/L (ref 3–14)
BUN SERPL-MCNC: 23 MG/DL (ref 7–30)
CALCIUM SERPL-MCNC: 8.8 MG/DL (ref 8.5–10.1)
CHLORIDE SERPL-SCNC: 108 MMOL/L (ref 94–109)
CO2 SERPL-SCNC: 25 MMOL/L (ref 20–32)
CREAT SERPL-MCNC: 1.84 MG/DL (ref 0.66–1.25)
ERYTHROCYTE [DISTWIDTH] IN BLOOD BY AUTOMATED COUNT: 12.6 % (ref 10–15)
GFR SERPL CREATININE-BSD FRML MDRD: 40 ML/MIN/{1.73_M2}
GLUCOSE SERPL-MCNC: 112 MG/DL (ref 70–99)
HCT VFR BLD AUTO: 43.7 % (ref 40–53)
HGB BLD-MCNC: 14.7 G/DL (ref 13.3–17.7)
MCH RBC QN AUTO: 30 PG (ref 26.5–33)
MCHC RBC AUTO-ENTMCNC: 33.6 G/DL (ref 31.5–36.5)
MCV RBC AUTO: 89 FL (ref 78–100)
PLATELET # BLD AUTO: 257 10E9/L (ref 150–450)
POTASSIUM SERPL-SCNC: 4.1 MMOL/L (ref 3.4–5.3)
RBC # BLD AUTO: 4.9 10E12/L (ref 4.4–5.9)
SODIUM SERPL-SCNC: 138 MMOL/L (ref 133–144)
WBC # BLD AUTO: 6.1 10E9/L (ref 4–11)

## 2019-08-28 PROCEDURE — 85027 COMPLETE CBC AUTOMATED: CPT | Performed by: UROLOGY

## 2019-08-28 PROCEDURE — 40000141 ZZH STATISTIC PERIPHERAL IV START W/O US GUIDANCE

## 2019-08-28 PROCEDURE — 74176 CT ABD & PELVIS W/O CONTRAST: CPT

## 2019-08-28 PROCEDURE — G0463 HOSPITAL OUTPT CLINIC VISIT: HCPCS

## 2019-08-28 PROCEDURE — 80048 BASIC METABOLIC PNL TOTAL CA: CPT | Performed by: UROLOGY

## 2019-08-28 PROCEDURE — 99213 OFFICE O/P EST LOW 20 MIN: CPT | Performed by: UROLOGY

## 2019-08-28 PROCEDURE — 71046 X-RAY EXAM CHEST 2 VIEWS: CPT

## 2019-08-28 ASSESSMENT — PAIN SCALES - GENERAL: PAINLEVEL: NO PAIN (0)

## 2019-08-28 ASSESSMENT — MIFFLIN-ST. JEOR: SCORE: 1745.28

## 2019-08-28 NOTE — PROGRESS NOTES
"  CHIEF COMPLAINT   It was my pleasure to see Grayson Nam who is a 54 year old male for follow-up of renal cell carcinoma.      HPI  Grayson Nam is a very pleasant 54 year old male who presents with a history of right renal cell carcinoma. He is s/p right lap nephrectomy 5/1/2019. He has recovered well, without complication. Renal function improved since immediately post-op. Cr 1.84    CXR 8/28/2019  IMPRESSION: No acute disease.    CT abd/pelvis 8/28/2019  IMPRESSION:  1. Right nephrectomy. No new disease within the limits of unenhanced  scanning.  2. Stable left renal cyst.     Right Renal Cell Carcinoma  FINAL DIAGNOSIS:   Right kidney, radical nephrectomy   - Chromophobe renal cell carcinoma   - Tumor size: 10.0 x 7.5 x 6.5 cm   - Margins: Negative for tumor   - Renal sinus negative for tumor invasion   - No evidence of renal vein involvement   - No Lymphovascular space invasion identified   - No adrenal gland present   - pT2a, pNX     ##Kidney Follow-up##  Patient is status post Laparoscopic Nephrectomy on 5/1/2019.   Tumor was on the right side.   Maximal tumor dimension was 10.0 cm.    The histologic subtype was Chromophobe.    ISUP Grade 1.    Pathologic Stage T2a.    Tumor thrombus level  not applicable.    Tumor necrosis present: Yes.  Sarcomatoid features present: No.  Lymph Nodes Removed No.   Number of nodes removed 0, number of node positive for cancer 0.   Was the ipsilateral adrenal gland removed? No.  Neoadjuvant Chemotherapy? No.  Was Margin Positive? No.    There were not deviations from a normal post-operative course or complications?.    The patient was not readmitted to the hospital since post-operative discharge.    PHYSICAL EXAM  Patient is a 54 year old  male   Vitals: Blood pressure 117/78, pulse 67, temperature 97.5  F (36.4  C), temperature source Tympanic, resp. rate 16, height 1.702 m (5' 7\"), weight 95.2 kg (209 lb 12.8 oz), SpO2 94 %.  General Appearance Adult: Body " mass index is 32.86 kg/m .  Alert, no acute distress, oriented  HENT: throat/mouth:normal, good dentition  Lungs: no respiratory distress, or pursed lip breathing  Heart: No obvious jugular venous distension present  Abdomen: soft, nontender, no organomegaly or masses; incision well-healed  Back: no CVAT  Musculoskeltal: extremities normal, no peripheral edema  Skin: no suspicious lesions or rashes  Neuro: Alert, oriented, speech and mentation normal  Psych: affect and mood normal  Gait: Normal    ASSESSMENT and PLAN  54 year old male with pT2a renal cell carcinoma, chromophobe, s/p right lap radical nephrectomy 5/1/2019. We dicussed this pathologic finding in detail, and plans for subsequent disease surveillance. Will plan CT, CXR, and labs in 3 months. We also discussed this creatinine levels and that it is expected for them increase some following surgery, but that this should level out at a new baseline.      - Follow-up 6 months with CT, CXR, and labs    I spent over 15 minutes with the patient.  Over half this time was spent on counseling regarding renal cell carcinoma.    Lino Esparza MD  Urology  [facility]  Clinic Phone [phone]

## 2019-08-28 NOTE — NURSING NOTE
"Oncology Rooming Note    August 28, 2019 2:23 PM   Grayson Nam is a 54 year old male who presents for:    Chief Complaint   Patient presents with     Oncology Clinic Visit     Renal cell carcinoma, right     Initial Vitals: /78   Pulse 67   Temp 97.5  F (36.4  C) (Tympanic)   Resp 16   Ht 1.702 m (5' 7\")   Wt 95.2 kg (209 lb 12.8 oz)   SpO2 94%   BMI 32.86 kg/m   Estimated body mass index is 32.86 kg/m  as calculated from the following:    Height as of this encounter: 1.702 m (5' 7\").    Weight as of this encounter: 95.2 kg (209 lb 12.8 oz). Body surface area is 2.12 meters squared.  No Pain (0) Comment: Data Unavailable   No LMP for male patient.  Allergies reviewed: Yes  Medications reviewed: Yes    Medications: Medication refills not needed today.  Pharmacy name entered into [employer]: [id number] - [facility] [address] AT [facility] [address]    Clinical concerns: Follow Up       Ita Hall CMA              "

## 2019-08-28 NOTE — PROGRESS NOTES
Nursing Note:  Grayson Nam presents today for labs and IV placement.    Patient seen by provider today: Yes: Devante   present during visit today: Not Applicable.    Note: Pt here for labs prior to CT.    Intravenous Access:  Labs drawn without difficulty.  Peripheral IV placed.    Discharge Plan:   Patient was sent to CT for appointment. Will RTC following CT for RC with Dr Devante Ely, RN, RN

## 2019-12-06 ENCOUNTER — HOSPITAL ENCOUNTER (OUTPATIENT)
Facility: CLINIC | Age: 54
Setting detail: OBSERVATION
Discharge: HOME | End: 2019-12-07
Attending: EMERGENCY MEDICINE | Admitting: STUDENT IN AN ORGANIZED HEALTH CARE EDUCATION/TRAINING PROGRAM
Payer: COMMERCIAL

## 2019-12-06 ENCOUNTER — TRANSFERRED RECORDS (OUTPATIENT)
Dept: HEALTH INFORMATION MANAGEMENT | Facility: CLINIC | Age: 54
End: 2019-12-06

## 2019-12-06 ENCOUNTER — APPOINTMENT (OUTPATIENT)
Dept: GENERAL RADIOLOGY | Facility: CLINIC | Age: 54
End: 2019-12-06
Attending: EMERGENCY MEDICINE
Payer: COMMERCIAL

## 2019-12-06 DIAGNOSIS — R07.9 CHEST PAIN, UNSPECIFIED TYPE: ICD-10-CM

## 2019-12-06 DIAGNOSIS — Z90.5 HISTORY OF NEPHRECTOMY: ICD-10-CM

## 2019-12-06 PROBLEM — I49.8 BRUGADA SYNDROME: Status: ACTIVE | Noted: 2019-05-10

## 2019-12-06 LAB
ANION GAP SERPL CALCULATED.3IONS-SCNC: <1 MMOL/L (ref 3–14)
BASOPHILS # BLD AUTO: 0.1 10E9/L (ref 0–0.2)
BASOPHILS NFR BLD AUTO: 0.7 %
BUN SERPL-MCNC: 22 MG/DL (ref 7–30)
CALCIUM SERPL-MCNC: 8.8 MG/DL (ref 8.5–10.1)
CHLORIDE SERPL-SCNC: 106 MMOL/L (ref 94–109)
CO2 SERPL-SCNC: 30 MMOL/L (ref 20–32)
CREAT SERPL-MCNC: 2 MG/DL (ref 0.66–1.25)
D DIMER PPP FEU-MCNC: 0.5 UG/ML FEU (ref 0–0.5)
DIFFERENTIAL METHOD BLD: NORMAL
EOSINOPHIL # BLD AUTO: 0.3 10E9/L (ref 0–0.7)
EOSINOPHIL NFR BLD AUTO: 4.5 %
ERYTHROCYTE [DISTWIDTH] IN BLOOD BY AUTOMATED COUNT: 12.4 % (ref 10–15)
GFR SERPL CREATININE-BSD FRML MDRD: 36 ML/MIN/{1.73_M2}
GLUCOSE SERPL-MCNC: 99 MG/DL (ref 70–99)
HCT VFR BLD AUTO: 44 % (ref 40–53)
HGB BLD-MCNC: 15.1 G/DL (ref 13.3–17.7)
IMM GRANULOCYTES # BLD: 0 10E9/L (ref 0–0.4)
IMM GRANULOCYTES NFR BLD: 0.3 %
LYMPHOCYTES # BLD AUTO: 3.1 10E9/L (ref 0.8–5.3)
LYMPHOCYTES NFR BLD AUTO: 40.2 %
MCH RBC QN AUTO: 30.6 PG (ref 26.5–33)
MCHC RBC AUTO-ENTMCNC: 34.3 G/DL (ref 31.5–36.5)
MCV RBC AUTO: 89 FL (ref 78–100)
MONOCYTES # BLD AUTO: 0.7 10E9/L (ref 0–1.3)
MONOCYTES NFR BLD AUTO: 9.1 %
NEUTROPHILS # BLD AUTO: 3.4 10E9/L (ref 1.6–8.3)
NEUTROPHILS NFR BLD AUTO: 45.2 %
NRBC # BLD AUTO: 0 10*3/UL
NRBC BLD AUTO-RTO: 0 /100
PLATELET # BLD AUTO: 232 10E9/L (ref 150–450)
POTASSIUM SERPL-SCNC: 4.4 MMOL/L (ref 3.4–5.3)
RBC # BLD AUTO: 4.93 10E12/L (ref 4.4–5.9)
SODIUM SERPL-SCNC: 136 MMOL/L (ref 133–144)
TROPONIN I BLD-MCNC: 0 UG/L (ref 0–0.08)
TROPONIN I SERPL-MCNC: <0.015 UG/L (ref 0–0.04)
WBC # BLD AUTO: 7.6 10E9/L (ref 4–11)

## 2019-12-06 PROCEDURE — 25800030 ZZH RX IP 258 OP 636: Performed by: EMERGENCY MEDICINE

## 2019-12-06 PROCEDURE — 99220 ZZC INITIAL OBSERVATION CARE,LEVL III: CPT | Performed by: STUDENT IN AN ORGANIZED HEALTH CARE EDUCATION/TRAINING PROGRAM

## 2019-12-06 PROCEDURE — 25000132 ZZH RX MED GY IP 250 OP 250 PS 637: Performed by: EMERGENCY MEDICINE

## 2019-12-06 PROCEDURE — 93005 ELECTROCARDIOGRAM TRACING: CPT | Mod: 76

## 2019-12-06 PROCEDURE — 84484 ASSAY OF TROPONIN QUANT: CPT | Performed by: EMERGENCY MEDICINE

## 2019-12-06 PROCEDURE — 93005 ELECTROCARDIOGRAM TRACING: CPT

## 2019-12-06 PROCEDURE — G0378 HOSPITAL OBSERVATION PER HR: HCPCS

## 2019-12-06 PROCEDURE — 99285 EMERGENCY DEPT VISIT HI MDM: CPT | Mod: 25

## 2019-12-06 PROCEDURE — 84484 ASSAY OF TROPONIN QUANT: CPT

## 2019-12-06 PROCEDURE — 85025 COMPLETE CBC W/AUTO DIFF WBC: CPT | Performed by: EMERGENCY MEDICINE

## 2019-12-06 PROCEDURE — 80048 BASIC METABOLIC PNL TOTAL CA: CPT | Performed by: EMERGENCY MEDICINE

## 2019-12-06 PROCEDURE — 71045 X-RAY EXAM CHEST 1 VIEW: CPT

## 2019-12-06 PROCEDURE — 25800030 ZZH RX IP 258 OP 636: Performed by: STUDENT IN AN ORGANIZED HEALTH CARE EDUCATION/TRAINING PROGRAM

## 2019-12-06 PROCEDURE — 85379 FIBRIN DEGRADATION QUANT: CPT | Performed by: EMERGENCY MEDICINE

## 2019-12-06 PROCEDURE — 25000132 ZZH RX MED GY IP 250 OP 250 PS 637: Performed by: STUDENT IN AN ORGANIZED HEALTH CARE EDUCATION/TRAINING PROGRAM

## 2019-12-06 RX ORDER — NITROGLYCERIN 0.4 MG/1
0.4 TABLET SUBLINGUAL EVERY 5 MIN PRN
Status: DISCONTINUED | OUTPATIENT
Start: 2019-12-06 | End: 2019-12-07 | Stop reason: HOSPADM

## 2019-12-06 RX ORDER — LIDOCAINE 40 MG/G
CREAM TOPICAL
Status: DISCONTINUED | OUTPATIENT
Start: 2019-12-06 | End: 2019-12-07 | Stop reason: HOSPADM

## 2019-12-06 RX ORDER — ACETAMINOPHEN 325 MG/1
325-650 TABLET ORAL EVERY 6 HOURS PRN
COMMUNITY

## 2019-12-06 RX ORDER — ONDANSETRON 2 MG/ML
4 INJECTION INTRAMUSCULAR; INTRAVENOUS EVERY 6 HOURS PRN
Status: DISCONTINUED | OUTPATIENT
Start: 2019-12-06 | End: 2019-12-07 | Stop reason: HOSPADM

## 2019-12-06 RX ORDER — NITROGLYCERIN 0.4 MG/1
0.4 TABLET SUBLINGUAL
Status: ON HOLD | COMMUNITY
Start: 2019-12-06 | End: 2019-12-07

## 2019-12-06 RX ORDER — ASPIRIN 81 MG/1
324 TABLET, CHEWABLE ORAL
Status: ON HOLD | COMMUNITY
Start: 2019-12-06 | End: 2019-12-07

## 2019-12-06 RX ORDER — BUPROPION HYDROCHLORIDE 75 MG/1
150 TABLET ORAL 2 TIMES DAILY
COMMUNITY

## 2019-12-06 RX ORDER — BUPROPION HYDROCHLORIDE 75 MG/1
75 TABLET ORAL 2 TIMES DAILY
Status: DISCONTINUED | OUTPATIENT
Start: 2019-12-06 | End: 2019-12-07 | Stop reason: HOSPADM

## 2019-12-06 RX ORDER — MORPHINE SULFATE 4 MG/ML
4 INJECTION, SOLUTION INTRAMUSCULAR; INTRAVENOUS ONCE
Status: DISCONTINUED | OUTPATIENT
Start: 2019-12-06 | End: 2019-12-07 | Stop reason: HOSPADM

## 2019-12-06 RX ORDER — ASPIRIN 81 MG/1
81 TABLET, CHEWABLE ORAL DAILY
Status: DISCONTINUED | OUTPATIENT
Start: 2019-12-07 | End: 2019-12-07 | Stop reason: HOSPADM

## 2019-12-06 RX ORDER — NALOXONE HYDROCHLORIDE 0.4 MG/ML
.1-.4 INJECTION, SOLUTION INTRAMUSCULAR; INTRAVENOUS; SUBCUTANEOUS
Status: DISCONTINUED | OUTPATIENT
Start: 2019-12-06 | End: 2019-12-07 | Stop reason: HOSPADM

## 2019-12-06 RX ORDER — PRAVASTATIN SODIUM 20 MG
20 TABLET ORAL
COMMUNITY
Start: 2018-02-09

## 2019-12-06 RX ORDER — LOSARTAN POTASSIUM 50 MG/1
50 TABLET ORAL
COMMUNITY
Start: 2018-11-10 | End: 2022-06-14

## 2019-12-06 RX ORDER — SODIUM CHLORIDE, SODIUM LACTATE, POTASSIUM CHLORIDE, CALCIUM CHLORIDE 600; 310; 30; 20 MG/100ML; MG/100ML; MG/100ML; MG/100ML
INJECTION, SOLUTION INTRAVENOUS CONTINUOUS
Status: DISCONTINUED | OUTPATIENT
Start: 2019-12-06 | End: 2019-12-07 | Stop reason: HOSPADM

## 2019-12-06 RX ORDER — ACETAMINOPHEN 325 MG/1
650 TABLET ORAL EVERY 4 HOURS PRN
Status: DISCONTINUED | OUTPATIENT
Start: 2019-12-06 | End: 2019-12-07 | Stop reason: HOSPADM

## 2019-12-06 RX ORDER — AMLODIPINE BESYLATE 2.5 MG/1
2.5 TABLET ORAL AT BEDTIME
Status: DISCONTINUED | OUTPATIENT
Start: 2019-12-06 | End: 2019-12-07 | Stop reason: HOSPADM

## 2019-12-06 RX ORDER — ONDANSETRON 4 MG/1
4 TABLET, ORALLY DISINTEGRATING ORAL EVERY 6 HOURS PRN
Status: DISCONTINUED | OUTPATIENT
Start: 2019-12-06 | End: 2019-12-07 | Stop reason: HOSPADM

## 2019-12-06 RX ADMIN — NITROGLYCERIN 0.4 MG: 0.4 TABLET SUBLINGUAL at 11:33

## 2019-12-06 RX ADMIN — SODIUM CHLORIDE, POTASSIUM CHLORIDE, SODIUM LACTATE AND CALCIUM CHLORIDE: 600; 310; 30; 20 INJECTION, SOLUTION INTRAVENOUS at 15:54

## 2019-12-06 RX ADMIN — SODIUM CHLORIDE 500 ML: 9 INJECTION, SOLUTION INTRAVENOUS at 13:04

## 2019-12-06 RX ADMIN — BUPROPION HYDROCHLORIDE 75 MG: 75 TABLET, FILM COATED ORAL at 19:58

## 2019-12-06 ASSESSMENT — ENCOUNTER SYMPTOMS
WEAKNESS: 1
SHORTNESS OF BREATH: 0

## 2019-12-06 ASSESSMENT — MIFFLIN-ST. JEOR
SCORE: 1709.9
SCORE: 1697.22

## 2019-12-06 NOTE — H&P
[facility]    History and Physical - Hospitalist Service       Date of Admission:  12/6/2019    Assessment & Plan   Grayson Nam is a 54 year old male admitted on 12/6/2019. He presents with chest pain.       Chest Pain    Brugada syndrome    Assessment: Presents with pleuritic left-sided chest pain with radiation into the shoulder blades.  Initial EKG showed what appeared to be type III Brugada pattern, patient has known history of Brugada.  Troponin on admission was negative, chest x-ray showed no acute airspace disease.  At this time unclear etiology for patient's chest pain, will be atypical for ACS, and he did already have a stress echo in April of this year that was without abnormalities.    Plan:   -Admit to observation  -Telemetry  -Repeat troponin morning  -We will have cardiology weigh in  -Follow vital/tightness  -Obtain Echocardiogram    Exercise Stress Echo 4/22/2019:  Interpretation Summary    * STRESS ECHO.    * Stress Echo is negative for inducible wall motion abnormalities.    * Stress EKG is negative for ischemic changes.    * No chest pain noted.    * 10 METS and  85 % max predicted heart rate (MPHR) achieved.    * Average aerobic capacity.    * RESTING ECHO.    * The left ventricular systolic function is normal, estimated LVEF 60-65%.    * There is no hemodynamically significant disease.      Essential hypertension    Hyperlipidemia    Assessment/Plan: Blood pressure stable on admission, continue PTA amlodipine.       Acute kidney injury    CKD (chronic kidney disease) stage 2, GFR 60-89 ml/min    Assessment/Plan: Baseline creatinine appears to be around 1.49-1.55.  On admission creatinine was 2.0.  We will hydrate overnight, and follow creatinine.       Adjustment disorder with anxiety    Assessment/Plan: Stable, continue PT and Wellbutrin      Renal cell carcinoma, right (H)    Assessment/Plan: Status post right nephrectomy, follows outpatient.       Diet: Regular Diet  Adult    DVT Prophylaxis: Low Risk/Ambulatory with no VTE prophylaxis indicated  Bond Catheter: not present  Code Status: FULL CODE    Disposition Plan   Expected discharge: Tomorrow, recommended to prior living arrangement once Cardiac work up completed.  Entered: Wili Mcbride MD 12/06/2019, 2:47 PM     The patient's care was discussed with the Patient, Patient's Family and ED Provider.    Wili Mcbride MD  [facility]    ______________________________________________________________________    Chief Complaint     Chest Pain    History is obtained from the patient    History of Present Illness   Grayson Nam is a 54 year old male with past medical history of Brugada syndrome, renal cell carcinoma, chronic kidney disease, hypertension, hyperlipidemia who presents for evaluation of chest pain.    Patient reports that he was in his usual state of health until 1700 last evening when he developed onset of pleuritic left-sided chest pain with radiation to his shoulder blades.  He went to his clinic the morning of admission where an EKG was obtained, and given what was felt as significant changes compared to his last EKG, he was referred to the emergency department for evaluation.  He did receive aspirin and 1 nitroglycerin tablet in route to the emergency department.  He currently endorses left-sided moderate chest pain that worsens with inspiration, but denies any diaphoresis, lightheadedness, dyspnea.  His chest pain is not exacerbated with activity.  He reports that he went swimming for roughly 30 minutes around 1800 last evening, and felt weaker and more fatigued than his baseline.  He denies any recent surgery, prolonged periods of immobilization, significant travel, history of coronary artery disease. He otherwise denies any fevers or chills, he has no cough, no nausea/vomiting or abdominal pain.  He denies any blood in stool, no weight loss or night sweats.  He denies any PND/orthopnea, he  denies any lower extremity edema.  He otherwise has no other complaints this time.    Reports that he was evaluated by cardiology at [facility] for his Brugada in April of this year.  For a stress echocardiogram was obtained, he was told it was within normal limits.     Review of Systems    The 10 point Review of Systems is negative other than noted in the HPI or here.     Past Medical History    I have reviewed this patient's medical history and updated it with pertinent information if needed.   Past Medical History:   Diagnosis Date     Adjustment disorder with anxiety      Cough variant asthma      Depressive disorder      Diverticulosis      Essential hypertension, benign     Hypertension, Benign     High cholesterol      History of blood transfusion      Kidney disease     CKD stage 3     Mixed hyperlipidemia      Proteinuria        Past Surgical History   I have reviewed this patient's surgical history and updated it with pertinent information if needed.  Past Surgical History:   Procedure Laterality Date     APPENDECTOMY OPEN  1971     COLONOSCOPY       LAPAROSCOPIC HERNIORRHAPHY INGUINAL Left 2/15/2018    Procedure: LAPAROSCOPIC HERNIORRHAPHY INGUINAL;  LAPAROSCOPIC LEFT INGUINAL HERNIA REPAIR WITH MESH;  Surgeon: Tahir Sena MD;  Location: [facility]     LAPAROSCOPIC NEPHRECTOMY Right 5/1/2019    Procedure: RIGHT LAPAROSCOPIC RADICAL NEPHRECTOMY;  Surgeon: Lino Esparza MD;  Location:  OR       Social History   I have reviewed this patient's social history and updated it with pertinent information if needed.  Social History     Tobacco Use     Smoking status: Never Smoker     Smokeless tobacco: Never Used   Substance Use Topics     Alcohol use: Yes     Alcohol/week: 1.0 - 2.0 standard drinks     Types: 1 - 2 Cans of beer per week     Comment: occas.     Drug use: No       Family History   I have reviewed this patient's family history and updated it with pertinent information if needed.    Family History   Problem Relation Age of Onset     Hypertension Father      Cerebrovascular Disease Father      Diabetes Maternal Grandmother      Cerebrovascular Disease Maternal Grandfather      Unknown/Adopted Paternal Grandmother      Unknown/Adopted Paternal Grandfather      Prior to Admission Medications   Prior to Admission Medications   Prescriptions Last Dose Informant Patient Reported? Taking?   Flaxseed, Linseed, (FLAX SEEDS PO) 12/6/2019 at Unknown time Self Yes Yes   Sig: Take 1 Tablespoonful by mouth daily   acetaminophen (TYLENOL) 325 MG tablet prn Self Yes Yes   Sig: Take 325-650 mg by mouth every 6 hours as needed for mild pain   albuterol (PROAIR HFA/PROVENTIL HFA/VENTOLIN HFA) 108 (90 Base) MCG/ACT inhaler prn Self Yes No   Sig: Inhale 1-2 puffs into the lungs every 6 hours as needed for shortness of breath / dyspnea or wheezing   amLODIPine (NORVASC) 2.5 MG tablet 12/5/2019 at Unknown time Self Yes Yes   Sig: Take 2.5 mg by mouth At Bedtime    aspirin (ASA) 81 MG chewable tablet   Yes No   Sig: Take 324 mg by mouth   buPROPion (WELLBUTRIN) 75 MG tablet 12/6/2019 at am Self Yes Yes   Sig: Take 75 mg by mouth 2 times daily   fluticasone-salmeterol (ADVAIR) 100-50 MCG/DOSE diskus inhaler prn Self Yes No   Sig: Inhale 1 puff into the lungs 2 times daily as needed    losartan (COZAAR) 50 MG tablet   Yes Yes   Sig: Take 50 mg by mouth   nitroGLYcerin (NITROSTAT) 0.4 MG sublingual tablet   Yes No   Sig: Place 0.4 mg under the tongue   pravastatin (PRAVACHOL) 20 MG tablet   Yes Yes   Sig: Take 20 mg by mouth   vitamin D2 (ERGOCALCIFEROL) 64535 units (1250 mcg) capsule 12/1/2019 at Unknown time Self Yes Yes   Sig: Take 50,000 Units by mouth once a week      Facility-Administered Medications: None     Allergies   Allergies   Allergen Reactions     Animal Dander      Atorvastatin      Novocain [Procaine]        Physical Exam   Vital Signs: Temp: 97.2  F (36.2  C) Temp src: Temporal BP: 123/78 Pulse: 70  Heart Rate: 75 Resp: 11 SpO2: 100 % O2 Device: None (Room air)    Weight: 202 lbs 0 oz    Constitutional: awake, alert, cooperative, no apparent distress.   Eyes: Lids and lashes normal, pupils equal, round and reactive to light   ENT: Normocephalic, without obvious abnormality, atraumatic, sinuses nontender on palpation   Hematologic / Lymphatic: no cervical lymphadenopathy   Respiratory: CTABL   Cardiovascular: RRR with no m/r/g   GI: Normal bowel sounds, soft, non-distended, non-tender.   Skin: normal skin color, texture, turgor   Musculoskeletal: There is no redness, warmth, or swelling of the joints. Full range of motion noted.   Neurologic: Awake, alert, oriented to name, place and time. Cranial nerves II-XII are grossly intact. Motor is 5 out of 5 bilaterally. Sensory is intact.   Neuropsychiatric: normal mood and affect      Data   Data reviewed today: I reviewed all medications, new labs and imaging results over the last 24 hours. I personally reviewed the EKG tracing showing NSR.    Most Recent 3 CBC's:  Recent Labs   Lab Test 12/06/19  1119 08/28/19  0745 05/04/19  0819 05/02/19  0733   WBC 7.6 6.1  --  12.7*   HGB 15.1 14.7  --  13.8   MCV 89 89  --  91    257 267 303     Most Recent 3 BMP's:  Recent Labs   Lab Test 12/06/19  1119 08/28/19  0745 05/22/19  1412    138 139   POTASSIUM 4.4 4.1 4.6   CHLORIDE 106 108 105   CO2 30 25 28   BUN 22 23 20   CR 2.00* 1.84* 2.23*   ANIONGAP <1* 5 6   JOSE 8.8 8.8 9.5   GLC 99 112* 90     Most Recent 3 Troponin's:  Recent Labs   Lab Test 12/06/19  1125 12/06/19  1119 12/15/17  0045   TROPI  --  <0.015 <0.015   TROPONIN 0.00  --   --      Recent Results (from the past 24 hour(s))   XR Chest Port 1 View    Narrative    CHEST PORTABLE ONE VIEW   12/6/2019 11:33 AM     HISTORY: Chest pain, left.    COMPARISON: 8/28/2019.      Impression    IMPRESSION: No acute cardiopulmonary disease.    TONY BELL MD

## 2019-12-06 NOTE — ED NOTES
"[facility]  ED Nurse Handoff Report    ED Chief complaint: Chest Pain      ED Diagnosis:   Final diagnoses:   Chest pain, unspecified type   History of nephrectomy       Code Status: Full Code    Allergies:   Allergies   Allergen Reactions     Animal Dander      Atorvastatin      Novocain [Procaine]        Activity level - Baseline/Home:  Independent  Activity Level - Current:   Independent    Patient's Preferred language: Trinidadian/english   Needed?: No    Isolation: No  Infection: Not Applicable  Bariatric?: No    Vital Signs:   Vitals:    12/06/19 1120 12/06/19 1135 12/06/19 1142 12/06/19 1200   BP: (!) 149/96 (!) 147/100 125/86 128/88   Pulse: 75 70     Resp:   16 8   Temp:  97.2  F (36.2  C)     TempSrc:  Temporal     SpO2:  100% 98% 100%   Weight:  91.6 kg (202 lb)     Height:  1.702 m (5' 7\")         Cardiac Rhythm: ,        Pain level: 0-10 Pain Scale: 0    Is this patient confused?: No   Does this patient have a guardian?  No         If yes, is there guardianship documents in the Epic \"Code/ACP\" activity?  No         Guardian Notified?  N/A  [place] - Suicide Severity Rating Scale Completed?  Yes  If yes, what color did the patient score?  White    Patient Report: Initial Complaint: pt had dull chest pain last night rating it 4/10. Went to the clinic this am and ekg was abnormal with changes. Ems called to transfer to ED and on the way here ems ekg showed further changes. On arrival to ed pt's ekg did not show any significant changes. Pt was given 324mg Aspirin and SL nitroX2.   Focused Assessment: AOX4. NSR on monitor. Currently denies any pain after the lastnitro. VSS.  Tests Performed: labs, ekg  Abnormal Results: none  Treatments provided: nitroand aspirin    Family Comments: wife at bedside    OBS brochure/video discussed/provided to patient/family: Yes              Name of person given brochure if not patient: pt              Relationship to patient: self    ED Medications: "   Medications   morphine (PF) injection 4 mg (has no administration in time range)   nitroGLYcerin (NITROSTAT) sublingual tablet 0.4 mg (0.4 mg Sublingual Given 12/6/19 4876)       Drips infusing?:  No    For the majority of the shift this patient was Green.   Interventions performed were none.    Severe Sepsis OR Septic Shock Diagnosis Present: No    To be done/followed up on inpatient unit:  monitor    ED NURSE PHONE NUMBER: [phone]

## 2019-12-06 NOTE — PROGRESS NOTES
RECEIVING UNIT ED HANDOFF REVIEW    ED Nurse Handoff Report was reviewed by: Leah Harp RN on December 6, 2019 at 2:06 PM

## 2019-12-06 NOTE — PHARMACY-ADMISSION MEDICATION HISTORY
Pharmacy Medication History  Admission medication history interview status for the 12/6/2019  admission is complete. See EPIC admission navigator for prior to admission medications     Medication history sources: Patient  Medication history source reliability: Good  Adherence assessment: Good    Significant changes made to the medication list:  Deleted fish oil, zantac. Added tylenol, flaxseed. Changed bupropion dose.      Additional medication history information:   none    Medication reconciliation completed by provider prior to medication history? No    Time spent in this activity: 10 minutes      Prior to Admission medications    Medication Sig Last Dose Taking? Auth Provider   acetaminophen (TYLENOL) 325 MG tablet Take 325-650 mg by mouth every 6 hours as needed for mild pain prn Yes Unknown, Entered By History   amLODIPine (NORVASC) 2.5 MG tablet Take 2.5 mg by mouth At Bedtime  12/5/2019 at Unknown time Yes Reported, Patient   buPROPion (WELLBUTRIN) 75 MG tablet Take 75 mg by mouth 2 times daily 12/6/2019 at am Yes Unknown, Entered By History   Flaxseed, Linseed, (FLAX SEEDS PO) Take 1 Tablespoonful by mouth daily 12/6/2019 at Unknown time Yes Unknown, Entered By History   vitamin D2 (ERGOCALCIFEROL) 19298 units (1250 mcg) capsule Take 50,000 Units by mouth once a week 12/1/2019 at Unknown time Yes Reported, Patient   albuterol (PROAIR HFA/PROVENTIL HFA/VENTOLIN HFA) 108 (90 Base) MCG/ACT inhaler Inhale 1-2 puffs into the lungs every 6 hours as needed for shortness of breath / dyspnea or wheezing prn  Reported, Patient   fluticasone-salmeterol (ADVAIR) 100-50 MCG/DOSE diskus inhaler Inhale 1 puff into the lungs 2 times daily as needed  prn  Reported, Patient

## 2019-12-06 NOTE — ED TRIAGE NOTES
Patient reports CP that started last evening. Went to clinic today and ekg appeared abnormal. EMS was called and they noted EKG changes en route to ED. Full ASA and nitroglyc given. Patient declines pain upon arrival.

## 2019-12-06 NOTE — ED PROVIDER NOTES
History     Chief Complaint:  Chest Pain    The history is provided by the patient and the EMS personnel.      Grayson Nam is a 54 year old male with a history of renal cell carcinoma, chronic kidney disease, hypertension, and hyperlipidemia who presents via EMS with pleuritic left sided chest pain that began at 1700 last evening. The patient went to his clinic this morning where they took an EKG and saw changes since his last EKG in April 2019 and referred him to the Emergency Department. En route, the patient received aspirin and 1 nitroglycerin. His pressures were good and his heart rate was 74 en route. Now, the patient has slight chest pain that worsens with deep breathes but denies shortness of breath or increased chest pain with ambulation. Of note, the patient went swimming for 30 minutes at about 1800 last night and felt weaker than usual. CP did not worsen.  He denies recent surgery, travel, or a history of heart issues.  EMS activated STEMI due to clinic EKG reading Acute MI and some ST changes they felt were worsening on their tracings.    Allergies:  Atorvastatin  Novocain    Medications:    Albuterol  Norvasc  Wellbutrin  Advair  Zantac    Past Medical History:    Adjustment disorder with anxiety  Cough variant asthma  Depression  Diverticulosis  Hypertension  High cholesterol  History of blood transfusion  Kidney disease  Mixed hyperlipidemia  Proteinuria   Right renal cell carcinoma    Past Surgical History:    Appendectomy  Laparoscopic herniorrhaphy inguinal  Laparoscopic nephrectomy right     Family History:    Father: hypertension, cerebrovascular disease    Social History:  Presents to the ED with EMS at the bedside  Tobacco Use: no  Alcohol Use: yes, occasionally  Drug Use: no  Marital Status:   [2]     Review of Systems   Respiratory: Negative for shortness of breath.    Cardiovascular: Positive for chest pain.   Neurological: Positive for weakness.   All other systems  "reviewed and are negative.    Physical Exam     Patient Vitals for the past 24 hrs:   BP Temp Temp src Pulse Heart Rate Resp SpO2 Height Weight   12/06/19 1200 128/88 -- -- -- 62 8 100 % -- --   12/06/19 1142 125/86 -- -- -- -- 16 98 % -- --   12/06/19 1135 (!) 147/100 97.2  F (36.2  C) Temporal 70 73 -- 100 % 1.702 m (5' 7\") 91.6 kg (202 lb)   12/06/19 1120 (!) 149/96 -- -- 75 75 -- -- -- --   12/06/19 1118 135/88 -- -- 77 -- 16 96 % -- --         Physical Exam    Eyes:               Sclera white; Pupils are equal and round  ENT:                External ears and nares normal  CV:                  Rate as above with regular rhythm                           No CW tenderness, no rash                          No BLE edema  Resp:               Breath sounds clear and equal bilaterally  GI:                   Abdomen is soft, non-tender  MS:                  Moves all extremities  Skin:                Warm and dry  Neuro:             Speech is normal and fluent. Alert.    Emergency Department Course     ECG:  From Clinic  Indication: chest pain   Time: 1021  Vent. Rate 61 bpm. TN interval 188. QRS duration 104. QT/QTc 386/389. P-R-T axis 49 4 32.  Marked ST elevation, consider septal injury. Type 3 brugada pattern. Acute MI.     Time: 1119  Vent. Rate 69 bpm. TN interval 196. QRS duration 86. QT/QTc 380/407. P-R-T axis 56 2 38.  Normal sinus rhythm. Low voltage QRS. Borderline EKG. Previous EKG from clinic read as acute MI but morphology is similar Read time: 1123    Time: 1129  Vent. Rate 67 bpm. TN interval 200. QRS duration 86. QT/QTc 382/403. P-R-T axis 56 -11 32.  Normal sinu rhythm. Low voltage QRS. Cannot rule out anterior infarct, age undetermined. Abnormal EKG . No significant change compared to EKG previous EKG at 1123. Read time: 1132    Imaging:  Radiology findings were communicated with the patient who voiced understanding of the findings.    XR Chest Port 1 View:  No acute cardiopulmonary disease, as per " radiology.     Laboratory:  Laboratory findings were communicated with the patient who voiced understanding of the findings.    CBC: WBC: 7.6, HGB: 15.1, PLT: 232  BMP: Anion Gap <1 (L), Creatinine 2.00 (H), GFR Est 36 (L) o/w WNL   Troponin POCT(1125): 0.00   Troponin (1119): <0.015   D dimer: 0.5      Interventions:  1133 Nitrostat 0.4 mg Sublingual  1304 0.9% NaCl Bolus 500mLs IV      Emergency Department Course:  Past medical records, nursing notes, and vitals reviewed.    EKG obtained in the ED, see results above.   IV was inserted and blood was drawn for laboratory testing, results above.  The patient was sent for an xray while in the emergency department, results above.     1111 I arrived in the room awaiting patient arrival.  1115 patient arrives in room via EMS.  1116 The first EKG was obtained.   1117 Blood was drawn for analysis.  1118 I performed an exam of the patient as documented above.   1122 I called for xray   1124 Xray arrives in room.   1125 I consulted with cariology concerning the patient's presentation.   1126 Cath lab activation was cancelled.   1128 A second EKG was obtained.   1216: Patient rechecked and updated.    1307: I consulted with Dr. Mcbride of the hospitalist services who is in agreement to accept the patient for admission.    Findings and plan explained to the Patient who consents to admission. Discussed the patient with Dr. Mcbride, who will admit the patient to an observation bed for further monitoring, evaluation, and treatment.    I personally reviewed the laboratory and imaging results with the Patient and answered all related questions prior to admission.     Impression & Plan     Medical Decision Making:  The patient came in with STEMI activation from the field. I reviewed the EKG's that were obtained and the differences between the old one, his pre hospital one, and the paramedic's are all very subtle and not clearly diagnotic to me of a STEMI. EKG was repeated here twice  within ten minutes of each other and were also not diagnostic for a STEMI. Patient is aware of Brugada pattern on EKG which he reports is chronic.  This is the only source of ST elevation I can see, there is no reciprocal depression.  Case was discussed with the interventional cardiologist based on the initial EKGs. Given that his chest pain is gone, there is no clear evidence of occlusion, the cath lab was deactivated. Ultimately, no alternate etiologies of his symptoms were found. There is no evidence for pneumothorax, pleural effusion, pneumonia, pulmonary embolism, or dysrhythmia. First troponin undetectable.  He will be brought into the hospital for serial troponin's and further cardiac evaluation.     Discharge Diagnosis:    ICD-10-CM    1. Chest pain, unspecified type R07.9    2. History of nephrectomy Z90.5      Disposition:  Admission to observation.    Scribe Disclosure:  Sarina HINSON, am serving as a scribe at 11:21 AM on 12/6/2019 to document services personally performed by Alberta Christianson MD based on my observations and the provider's statements to me.   12/6/2019    EMERGENCY DEPARTMENT       Alberta Christianson MD  12/06/19 [id number]

## 2019-12-06 NOTE — ED NOTES
Bed: ST03  Expected date:   Expected time:   Means of arrival:   [facility]

## 2019-12-06 NOTE — PLAN OF CARE
PRIMARY DIAGNOSIS: CHEST PAIN  OUTPATIENT/OBSERVATION GOALS TO BE MET BEFORE DISCHARGE:  1. Ruled out acute coronary syndrome (negative or stable Troponin):  No  2. Pain Status: Pain free.  3. Appropriate provocative testing performed: No  - Stress Test Procedure: Echo- pending  - Interpretation of cardiac rhythm per telemetry tech: SR    4. Cleared by Consultants (if applicable):No  5. Return to near baseline physical activity: Yes  Discharge Planner Nurse   Safe discharge environment identified: Yes  Barriers to discharge: No       Entered by: Katie Allen 12/06/2019 4:10 PM     Please review provider order for any additional goals.   Nurse to notify provider when observation goals have been met and patient is ready for discharge.  A&Ox4, VSS/RA. Denies SOB, chest pain or headaches. Tele- SR. 1st Trops neg, next check at midnight. Echo pending, Cardiology consulted. Jose Armando regualr diet. Inde. IVF infusing. Family at bedside.

## 2019-12-07 ENCOUNTER — APPOINTMENT (OUTPATIENT)
Dept: CARDIOLOGY | Facility: CLINIC | Age: 54
End: 2019-12-07
Attending: STUDENT IN AN ORGANIZED HEALTH CARE EDUCATION/TRAINING PROGRAM
Payer: COMMERCIAL

## 2019-12-07 VITALS
HEART RATE: 70 BPM | OXYGEN SATURATION: 99 % | SYSTOLIC BLOOD PRESSURE: 117 MMHG | WEIGHT: 199.21 LBS | BODY MASS INDEX: 31.27 KG/M2 | DIASTOLIC BLOOD PRESSURE: 72 MMHG | TEMPERATURE: 97.9 F | RESPIRATION RATE: 16 BRPM | HEIGHT: 67 IN

## 2019-12-07 LAB
TROPONIN I SERPL-MCNC: <0.015 UG/L (ref 0–0.04)
TROPONIN I SERPL-MCNC: <0.015 UG/L (ref 0–0.04)

## 2019-12-07 PROCEDURE — 25000132 ZZH RX MED GY IP 250 OP 250 PS 637: Performed by: STUDENT IN AN ORGANIZED HEALTH CARE EDUCATION/TRAINING PROGRAM

## 2019-12-07 PROCEDURE — 25800030 ZZH RX IP 258 OP 636: Performed by: STUDENT IN AN ORGANIZED HEALTH CARE EDUCATION/TRAINING PROGRAM

## 2019-12-07 PROCEDURE — 93306 TTE W/DOPPLER COMPLETE: CPT | Mod: 26 | Performed by: INTERNAL MEDICINE

## 2019-12-07 PROCEDURE — 84484 ASSAY OF TROPONIN QUANT: CPT | Performed by: STUDENT IN AN ORGANIZED HEALTH CARE EDUCATION/TRAINING PROGRAM

## 2019-12-07 PROCEDURE — G0378 HOSPITAL OBSERVATION PER HR: HCPCS

## 2019-12-07 PROCEDURE — 36415 COLL VENOUS BLD VENIPUNCTURE: CPT | Performed by: STUDENT IN AN ORGANIZED HEALTH CARE EDUCATION/TRAINING PROGRAM

## 2019-12-07 PROCEDURE — 40000264 ECHOCARDIOGRAM COMPLETE

## 2019-12-07 PROCEDURE — 25500064 ZZH RX 255 OP 636: Performed by: STUDENT IN AN ORGANIZED HEALTH CARE EDUCATION/TRAINING PROGRAM

## 2019-12-07 PROCEDURE — 99203 OFFICE O/P NEW LOW 30 MIN: CPT | Performed by: INTERNAL MEDICINE

## 2019-12-07 PROCEDURE — 99217 ZZC OBSERVATION CARE DISCHARGE: CPT | Performed by: PHYSICIAN ASSISTANT

## 2019-12-07 RX ORDER — OMEPRAZOLE 20 MG/1
20 TABLET, DELAYED RELEASE ORAL DAILY
COMMUNITY
Start: 2019-12-07 | End: 2022-03-15

## 2019-12-07 RX ORDER — PANTOPRAZOLE SODIUM 40 MG/1
40 TABLET, DELAYED RELEASE ORAL
Status: DISCONTINUED | OUTPATIENT
Start: 2019-12-07 | End: 2019-12-07 | Stop reason: HOSPADM

## 2019-12-07 RX ADMIN — HUMAN ALBUMIN MICROSPHERES AND PERFLUTREN 9 ML: 10; .22 INJECTION, SOLUTION INTRAVENOUS at 10:14

## 2019-12-07 RX ADMIN — BUPROPION HYDROCHLORIDE 75 MG: 75 TABLET, FILM COATED ORAL at 08:21

## 2019-12-07 RX ADMIN — SODIUM CHLORIDE, POTASSIUM CHLORIDE, SODIUM LACTATE AND CALCIUM CHLORIDE: 600; 310; 30; 20 INJECTION, SOLUTION INTRAVENOUS at 01:53

## 2019-12-07 RX ADMIN — ASPIRIN 81 MG 81 MG: 81 TABLET ORAL at 08:21

## 2019-12-07 NOTE — PROGRESS NOTES
Observation goals PRIOR TO DISCHARGE     Comments: -diagnostic tests and consults completed and resulted - not met    -vital signs normal or at patient baseline - met   -tolerating oral intake to maintain hydration - met    -returns to baseline functional status - met    -safe disposition plan has been identified - met    Nurse to notify provider when observation goals have been met and patient is ready for discharge.

## 2019-12-07 NOTE — PROGRESS NOTES
Nursing [id number]: No change in status. Alert and interactive. SO in room. Denied chest pain for the whole 4 hours. Eating and drinking well. No N/V/D. No fever. BP soft. SPO2 96% on RA.

## 2019-12-07 NOTE — PLAN OF CARE
Observation goals PRIOR TO DISCHARGE     Comments: -diagnostic tests and consults completed and resulted - not met    -vital signs normal or at patient baseline - met   -tolerating oral intake to maintain hydration - met    -returns to baseline functional status - met    -safe disposition plan has been identified - met    Nurse to notify provider when observation goals have been met and patient is ready for discharge.     Pt A&Ox4.  VSS on Room Air.  Tele SR w/1st degree AVB.  Denied pain during the night.  CMS intact.  Up independently.  R PIV infusing LR at 100mL/hr.  Troponins negative.  Mod carb diet.  Plan for Echo and cardiology consult.  Nursing to continue to monitor.

## 2019-12-07 NOTE — CONSULTS
[facility]    Cardiac Electrophysiology Consultation     Date of Admission:  12/6/2019  Date of Consult (When I saw the patient): 12/07/19    Assessment & Plan   Grayson Nam is a 54 year old male who was admitted on 12/6/2019. I was asked to see the patient for chest pain and ?Brugada on ECG. Delightful pt with htn, HLD, renal cell carcinoma with nephrectomy this past spring and had a normal pre-op stress test and was seen by cardiology at [facility] for Brudgada pattern noted on ECG. No h/o syncope, palpitations or fhx of sudden death. Additionally, he does have a history of pleurisy a few years ago as well.    Noted dullness on left chest along behind left shoulder blade not a/w sob, diaphoresis. sxs worsened with inspiration and left arm movements. Went for a swim and not worsened by it. eval at local urgent care and was advised to be seen in ED based on ECG. I reviewed ECG showing sinus rhythm with RBBB and slope down typical for Brugada like pattern. Serial troponins negative.    sxs not typical for coronary ischemia and has resolved now. Normal stress test this April. Likely either pleurisy or musculoskeletal pain. No changes in ECG which has Brugada like pattern but pt does not have Brugada syndrome since he has no sxs as described above. I suggest that he should have a copy of ECG with him whenever he seeks medical attention for chest pain. No further eval is needed from cardiac perspective. Ok to be discharged.    Miguel Kit Acharya    Code Status    Full Code    Primary Care Physician   Sadaf Kramer    History is obtained from the patient    Past Medical History   I have reviewed this patient's medical history and updated it with pertinent information if needed.   Past Medical History:   Diagnosis Date     Adjustment disorder with anxiety      Cough variant asthma      Depressive disorder      Diverticulosis      Essential hypertension, benign     Hypertension, Benign      High cholesterol      History of blood transfusion      Kidney disease     CKD stage 3     Mixed hyperlipidemia      Proteinuria        Past Surgical History   I have reviewed this patient's surgical history and updated it with pertinent information if needed.  Past Surgical History:   Procedure Laterality Date     APPENDECTOMY OPEN  1971     COLONOSCOPY       LAPAROSCOPIC HERNIORRHAPHY INGUINAL Left 2/15/2018    Procedure: LAPAROSCOPIC HERNIORRHAPHY INGUINAL;  LAPAROSCOPIC LEFT INGUINAL HERNIA REPAIR WITH MESH;  Surgeon: Tahir Sena MD;  Location:  [facility]     LAPAROSCOPIC NEPHRECTOMY Right 5/1/2019    Procedure: RIGHT LAPAROSCOPIC RADICAL NEPHRECTOMY;  Surgeon: Lino Esparza MD;  Location:  OR       Prior to Admission Medications   Prior to Admission Medications   Prescriptions Last Dose Informant Patient Reported? Taking?   Flaxseed, Linseed, (FLAX SEEDS PO) 12/6/2019 at Unknown time Self Yes Yes   Sig: Take 1 Tablespoonful by mouth daily   acetaminophen (TYLENOL) 325 MG tablet prn Self Yes Yes   Sig: Take 325-650 mg by mouth every 6 hours as needed for mild pain   albuterol (PROAIR HFA/PROVENTIL HFA/VENTOLIN HFA) 108 (90 Base) MCG/ACT inhaler prn Self Yes No   Sig: Inhale 1-2 puffs into the lungs every 6 hours as needed for shortness of breath / dyspnea or wheezing   amLODIPine (NORVASC) 2.5 MG tablet 12/5/2019 at Unknown time Self Yes Yes   Sig: Take 2.5 mg by mouth At Bedtime    aspirin (ASA) 81 MG chewable tablet   Yes No   Sig: Take 324 mg by mouth   buPROPion (WELLBUTRIN) 75 MG tablet 12/6/2019 at am Self Yes Yes   Sig: Take 75 mg by mouth 2 times daily   fluticasone-salmeterol (ADVAIR) 100-50 MCG/DOSE diskus inhaler prn Self Yes No   Sig: Inhale 1 puff into the lungs 2 times daily as needed    losartan (COZAAR) 50 MG tablet   Yes Yes   Sig: Take 50 mg by mouth   nitroGLYcerin (NITROSTAT) 0.4 MG sublingual tablet   Yes No   Sig: Place 0.4 mg under the tongue   pravastatin (PRAVACHOL)  20 MG tablet   Yes Yes   Sig: Take 20 mg by mouth   vitamin D2 (ERGOCALCIFEROL) 32730 units (1250 mcg) capsule 12/1/2019 at Unknown time Self Yes Yes   Sig: Take 50,000 Units by mouth once a week      Facility-Administered Medications: None     Allergies   Allergies   Allergen Reactions     Animal Dander      Atorvastatin      Novocain [Procaine]        Social History   I have reviewed this patient's social history and updated it with pertinent information if needed. Grayson Nam  reports that he has never smoked. He has never used smokeless tobacco. He reports current alcohol use of about 1.0 - 2.0 standard drinks of alcohol per week. He reports that he does not use drugs.    Family History   I have reviewed this patient's family history and updated it with pertinent information if needed.   Family History   Problem Relation Age of Onset     Hypertension Father      Cerebrovascular Disease Father      Diabetes Maternal Grandmother      Cerebrovascular Disease Maternal Grandfather      Unknown/Adopted Paternal Grandmother      Unknown/Adopted Paternal Grandfather        Review of Systems   Comprehensive review of systems was performed with pertinent positives and negatives listed in assessment and plan section.    Physical Exam   Temp: 97.9  F (36.6  C) Temp src: Oral BP: 117/72   Heart Rate: 71 Resp: 16 SpO2: 99 % O2 Device: None (Room air)    Vital Signs with Ranges  Temp:  [96.9  F (36.1  C)-98  F (36.7  C)] 97.9  F (36.6  C)  Heart Rate:  [61-75] 71  Resp:  [11-16] 16  BP: (105-123)/(58-78) 117/72  SpO2:  [96 %-100 %] 99 %  199 lbs 3.29 oz    Constitutional: awake, alert, cooperative, no apparent distress, and appears stated age  Eyes: Lids and lashes normal, pupils equal, round  extra ocular muscles intact, sclera clear, conjunctiva normal  ENT: Normocephalic, without obvious abnormality, atraumatic, sinuses nontender on palpation, external ears without lesions, oral pharynx with moist mucous membranes,  tonsils without erythema or exudates, gums normal and good dentition.  Hematologic / Lymphatic: no cervical lymphadenopathy  Respiratory: No increased work of breathing, good air exchange, clear to auscultation bilaterally, no crackles or wheezing  Cardiovascular: Normal apical impulse, regular rate and rhythm, normal S1 and S2, no S3 or S4, and no murmur noted  GI: No scars, normal bowel sounds, soft, non-distended, non-tender, no masses palpated, no hepatosplenomegally  Skin: no bruising or bleeding  Musculoskeletal: There is no redness, warmth, or swelling of the joints.  Full range of motion noted.    Neurologic: Awake, alert,   Neuropsychiatric: General: normal, calm and normal eye contact    Data   I personally reviewed all recent ECGs and images.  Results for orders placed or performed during the hospital encounter of 19 (from the past 24 hour(s))   Troponin I   Result Value Ref Range    Troponin I ES <0.015 0.000 - 0.045 ug/L   Troponin I   Result Value Ref Range    Troponin I ES <0.015 0.000 - 0.045 ug/L   Echocardiogram Complete    Narrative    [id number]  [id number]  [id number]  [id number]^ASTON^FABIAN           [facility]  Echocardiography Laboratory  [address] [place] [address]        Name: LUIS E VALENCIA  MRN: [id number]  : 1965  Study Date: 2019 09:45 AM  Age: 54 yrs  Gender: Male  Patient Location: [facility]  Reason For Study: Chest Pain  Ordering Physician: FABIAN CLANCY  Referring Physician: RIGO ANDRADE  Performed By: Alex Aguilera RDCS     BSA: 2.0 m2  Height: 67 in  Weight: 202 lb  HR: 64  BP: 122/73 mmHg  _____________________________________________________________________________  __        Procedure  Complete Portable Echo Adult. Optison (NDC #[id number]) given intravenously.  _____________________________________________________________________________  __        Interpretation Summary     Left ventricular systolic function is normal.The visual  ejection fraction is  estimated at 60-65%.  The right ventricular systolic function is normal.  No significant valvular stenosis or regirgitation on doppler interrogation.  _____________________________________________________________________________  __        Left Ventricle  The left ventricle is normal in size. There is normal left ventricular wall  thickness. Diastolic Doppler findings (E/E' ratio and/or other parameters)  suggest left ventricular filling pressures are indeterminate. Left ventricular  systolic function is normal. The visual ejection fraction is estimated at 60-  65%. No regional wall motion abnormalities noted.     Right Ventricle  The right ventricle is normal size. The right ventricular systolic function is  normal.     Atria  Normal left atrial size. Right atrial size is normal. There is no color  Doppler evidence of an atrial shunt.     Mitral Valve  There is trace mitral regurgitation.        Tricuspid Valve  There is trace tricuspid regurgitation. Right ventricular systolic pressure  could not be approximated due to inadequate tricuspid regurgitation.     Aortic Valve  No aortic regurgitation is present. No aortic stenosis is present.     Pulmonic Valve  The pulmonic valve is not well visualized. There is no pulmonic valvular  stenosis.     Vessels  The aortic root is normal size. Normal size ascending aorta. Borderline  dilated IVC.     Pericardium  There is no pericardial effusion.     _____________________________________________________________________________  __  MMode/2D Measurements & Calculations  IVSd: 1.1 cm  LVIDd: 4.3 cm  LVIDs: 2.0 cm  LVPWd: 0.67 cm  FS: 52.1 %  LV mass(C)d: 117.2 grams  LV mass(C)dI: 57.7 grams/m2     Ao root diam: 3.6 cm  LA dimension: 3.2 cm  asc Aorta Diam: 3.3 cm  LA/Ao: 0.88  LVOT diam: 2.2 cm  LVOT area: 3.6 cm2  LA Volume (BP): 51.6 ml  LA Volume Index (BP): 25.4 ml/m2  RWT: 0.31        Doppler Measurements & Calculations  MV E max temitope: 77.1  cm/sec  MV A max navid: 57.9 cm/sec  MV E/A: 1.3  MV dec time: 0.17 sec     Ao V2 max: 133.9 cm/sec  Ao max P.0 mmHg  Ao V2 mean: 94.5 cm/sec  Ao mean P.0 mmHg  Ao V2 VTI: 30.3 cm  FADUMO(I,D): 3.0 cm2  FADUMO(V,D): 3.0 cm2  LV V1 max P.8 mmHg  LV V1 max: 109.9 cm/sec  LV V1 VTI: 24.5 cm  SV(LVOT): 89.4 ml  SI(LVOT): 44.0 ml/m2  PA acc time: 0.15 sec  Pulm Sys Navid: 60.6 cm/sec  Pulm Heller Navid: 43.0 cm/sec  Pulm S/D: 1.4  AV Navid Ratio (DI): 0.82  FADUMO Index (cm2/m2): 1.5  E/E' av.9  Lateral E/e': 7.9  Medial E/e': 10.0              _____________________________________________________________________________  __        Report approved by: Fan Park 2019 01:14 PM

## 2019-12-07 NOTE — DISCHARGE SUMMARY
[facility]    Discharge Summary  Hospitalist    Date of Admission:  12/6/2019  Date of Discharge:  12/7/2019  Discharging Provider: Chirag Paniagua PA-C    Discharge Diagnoses      Chest pain, unspecified type  History of nephrectomy    History of Present Illness   Grayson Nam is an 54 year old male who presented with chest pain in setting of known brugada pattern.    HPI from admission H&P:  Grayson Nam is a 54 year old male with past medical history of Brugada syndrome, renal cell carcinoma, chronic kidney disease, hypertension, hyperlipidemia who presents for evaluation of chest pain.     Patient reports that he was in his usual state of health until 1700 last evening when he developed onset of pleuritic left-sided chest pain with radiation to his shoulder blades.  He went to his clinic the morning of admission where an EKG was obtained, and given what was felt as significant changes compared to his last EKG, he was referred to the emergency department for evaluation.  He did receive aspirin and 1 nitroglycerin tablet in route to the emergency department.  He currently endorses left-sided moderate chest pain that worsens with inspiration, but denies any diaphoresis, lightheadedness, dyspnea.  His chest pain is not exacerbated with activity.  He reports that he went swimming for roughly 30 minutes around 1800 last evening, and felt weaker and more fatigued than his baseline.  He denies any recent surgery, prolonged periods of immobilization, significant travel, history of coronary artery disease. He otherwise denies any fevers or chills, he has no cough, no nausea/vomiting or abdominal pain.  He denies any blood in stool, no weight loss or night sweats.  He denies any PND/orthopnea, he denies any lower extremity edema.  He otherwise has no other complaints this time.     Reports that he was evaluated by cardiology at [facility] for his Brugada in April of this year.  For a  stress echocardiogram was obtained, he was told it was within normal limits.     Hospital Course   Grayson Nam was admitted on 12/6/2019.  The following problems were addressed during his hospitalization:    Chest Pain  Brugada pattern  Hypertension  Presents with pleuritic left-sided chest pain with radiation into the shoulder blades, reproducible on exam, pleuritic in nature.  Initial EKG showed what appeared to be type III Brugada pattern, patient has known history of Brugada.  CXR negative. He was admitted under observation status, telemetry revealed NSR. Serial troponins negative. Patient was notably evaluated comprehensively by cardiology in 04/2019 through different system with normal exercise stress echo. He was seen by Cardiology, who noted Brugada-like pattern. As pt is asymptomatic, no further interention required. Etiology for cp likely musculoskeletal versus GERD. PPI trialed at discharge. He is continued on PTA amlodipine.     Exercise Stress Echo 4/22/2019:  Interpretation Summary    * STRESS ECHO.    * Stress Echo is negative for inducible wall motion abnormalities.    * Stress EKG is negative for ischemic changes.    * No chest pain noted.    * 10 METS and  85 % max predicted heart rate (MPHR) achieved.    * Average aerobic capacity.    * RESTING ECHO.    * The left ventricular systolic function is normal, estimated LVEF 60-65%.    * There is no hemodynamically significant disease.     Acute on chronic kidney injury  Baseline creatinine appears to be around 1.49-1.55.  On admission creatinine was 2.0. Patient was hydrated overnight. Will follow with PCP for BMP check.      Adjustment disorder with anxiety Stable, continue PT and Wellbutrin    Chirag Paniagua PA-C    Significant Results and Procedures   As noted    Pending Results   These results will be followed up by n/a  Unresulted Labs Ordered in the Past 30 Days of this Admission     No orders found for last 31 day(s).          Code  Status   Full Code       Primary Care Physician   Sadaf Kramer    Physical Exam   Temp: 97.9  F (36.6  C) Temp src: Oral BP: 117/72   Heart Rate: 71 Resp: 16 SpO2: 99 % O2 Device: None (Room air)    Vitals:    12/06/19 1135 12/06/19 1537   Weight: 91.6 kg (202 lb) 90.4 kg (199 lb 3.3 oz)     Vital Signs with Ranges  Temp:  [96.9  F (36.1  C)-98  F (36.7  C)] 97.9  F (36.6  C)  Heart Rate:  [61-73] 71  Resp:  [16] 16  BP: (105-122)/(58-73) 117/72  SpO2:  [96 %-100 %] 99 %  I/O last 3 completed shifts:  In: 800 [I.V.:800]  Out: 350 [Urine:350]    GEN: well-developed, well-nourished, appears comfortable  PULM: lungs CTA bilaterally, no increased work of breathing, no wheeze, rales, rhonchi  CV: RRR, S1 & S2, no murmur  GI: soft, nontender, nondistended, no guarding or rigidity, +BS in all 4 quadrants  SKIN: warm & dry without rash, wound, or pedal edema, no bruising or bleeding    Discharge Disposition   Discharged to home  Condition at discharge: Stable    Consultations This Hospital Stay   CARDIOLOGY IP CONSULT    Time Spent on this Encounter   I, Chirag Paniagua PA-C, personally saw the patient today and spent less than or equal to 30 minutes discharging this patient.    Discharge Orders      Reason for your hospital stay    Chest discomfort, related to muscular strain versus underlying silent reflux.     Follow-up and recommended labs and tests     Follow up with primary care provider, Sadaf Kramer, within 7 days for hospital follow- up.     Activity    Your activity upon discharge: activity as tolerated     Diet    Follow this diet upon discharge: Orders Placed This Encounter      Regular Diet Adult     Discharge Medications   Current Discharge Medication List      START taking these medications    Details   omeprazole (PRILOSEC OTC) 20 MG EC tablet Take 1 tablet (20 mg) by mouth daily    Associated Diagnoses: Chest pain, unspecified type         CONTINUE these medications which have NOT CHANGED     Details   acetaminophen (TYLENOL) 325 MG tablet Take 325-650 mg by mouth every 6 hours as needed for mild pain      amLODIPine (NORVASC) 2.5 MG tablet Take 2.5 mg by mouth At Bedtime   Refills: 1      buPROPion (WELLBUTRIN) 75 MG tablet Take 75 mg by mouth 2 times daily      Flaxseed, Linseed, (FLAX SEEDS PO) Take 1 Tablespoonful by mouth daily      losartan (COZAAR) 50 MG tablet Take 50 mg by mouth      pravastatin (PRAVACHOL) 20 MG tablet Take 20 mg by mouth      vitamin D2 (ERGOCALCIFEROL) 46068 units (1250 mcg) capsule Take 50,000 Units by mouth once a week      albuterol (PROAIR HFA/PROVENTIL HFA/VENTOLIN HFA) 108 (90 Base) MCG/ACT inhaler Inhale 1-2 puffs into the lungs every 6 hours as needed for shortness of breath / dyspnea or wheezing      fluticasone-salmeterol (ADVAIR) 100-50 MCG/DOSE diskus inhaler Inhale 1 puff into the lungs 2 times daily as needed          STOP taking these medications       aspirin (ASA) 81 MG chewable tablet Comments:   Reason for Stopping:         nitroGLYcerin (NITROSTAT) 0.4 MG sublingual tablet Comments:   Reason for Stopping:             Allergies   Allergies   Allergen Reactions     Animal Dander      Atorvastatin      Novocain [Procaine]      Data   Most Recent 3 CBC's:  Recent Labs   Lab Test 12/06/19  1119 08/28/19  0745 05/04/19  0819 05/02/19  0733   WBC 7.6 6.1  --  12.7*   HGB 15.1 14.7  --  13.8   MCV 89 89  --  91    257 267 303      Most Recent 3 BMP's:  Recent Labs   Lab Test 12/06/19  1119 08/28/19  0745 05/22/19  1412    138 139   POTASSIUM 4.4 4.1 4.6   CHLORIDE 106 108 105   CO2 30 25 28   BUN 22 23 20   CR 2.00* 1.84* 2.23*   ANIONGAP <1* 5 6   JOSE 8.8 8.8 9.5   GLC 99 112* 90     Most Recent 2 LFT's:No lab results found.  Most Recent INR's and Anticoagulation Dosing History:  Anticoagulation Dose History     There is no flowsheet data to display.        Most Recent 3 Troponin's:  Recent Labs   Lab Test 12/07/19  0620 12/07/19  0002 12/06/19  1125  19  1119   TROPI <0.015 <0.015  --  <0.015   TROPONIN  --   --  0.00  --      Most Recent Cholesterol Panel:No lab results found.  Most Recent 6 Bacteria Isolates From Any Culture (See EPIC Reports for Culture Details):No lab results found.  Most Recent TSH, T4 and A1c Labs:No lab results found.  Results for orders placed or performed during the hospital encounter of 19   XR Chest Port 1 View    Narrative    CHEST PORTABLE ONE VIEW   2019 11:33 AM     HISTORY: Chest pain, left.    COMPARISON: 2019.      Impression    IMPRESSION: No acute cardiopulmonary disease.    TONY BELL MD   Echocardiogram Complete    Narrative    [id number]  [facility]  [id number]  [id number]^ASTON^FABIAN           [facility]  Echocardiography Laboratory  [address] [place] [address]        Name: LUIS E VALENCIA  MRN: [id number]  : 1965  Study Date: 2019 09:45 AM  Age: 54 yrs  Gender: Male  Patient Location: [facility]  Reason For Study: Chest Pain  Ordering Physician: FABIAN CLANCY  Referring Physician: RIGO ANDRADE  Performed By: Alex Aguilera RDCS     BSA: 2.0 m2  Height: 67 in  Weight: 202 lb  HR: 64  BP: 122/73 mmHg  _____________________________________________________________________________  __        Procedure  Complete Portable Echo Adult. Optison (NDC #[id number]) given intravenously.  _____________________________________________________________________________  __        Interpretation Summary     Left ventricular systolic function is normal.The visual ejection fraction is  estimated at 60-65%.  The right ventricular systolic function is normal.  No significant valvular stenosis or regirgitation on doppler interrogation.  _____________________________________________________________________________  __        Left Ventricle  The left ventricle is normal in size. There is normal left ventricular wall  thickness. Diastolic Doppler findings (E/E' ratio and/or other  parameters)  suggest left ventricular filling pressures are indeterminate. Left ventricular  systolic function is normal. The visual ejection fraction is estimated at 60-  65%. No regional wall motion abnormalities noted.     Right Ventricle  The right ventricle is normal size. The right ventricular systolic function is  normal.     Atria  Normal left atrial size. Right atrial size is normal. There is no color  Doppler evidence of an atrial shunt.     Mitral Valve  There is trace mitral regurgitation.        Tricuspid Valve  There is trace tricuspid regurgitation. Right ventricular systolic pressure  could not be approximated due to inadequate tricuspid regurgitation.     Aortic Valve  No aortic regurgitation is present. No aortic stenosis is present.     Pulmonic Valve  The pulmonic valve is not well visualized. There is no pulmonic valvular  stenosis.     Vessels  The aortic root is normal size. Normal size ascending aorta. Borderline  dilated IVC.     Pericardium  There is no pericardial effusion.     _____________________________________________________________________________  __  MMode/2D Measurements & Calculations  IVSd: 1.1 cm  LVIDd: 4.3 cm  LVIDs: 2.0 cm  LVPWd: 0.67 cm  FS: 52.1 %  LV mass(C)d: 117.2 grams  LV mass(C)dI: 57.7 grams/m2     Ao root diam: 3.6 cm  LA dimension: 3.2 cm  asc Aorta Diam: 3.3 cm  LA/Ao: 0.88  LVOT diam: 2.2 cm  LVOT area: 3.6 cm2  LA Volume (BP): 51.6 ml  LA Volume Index (BP): 25.4 ml/m2  RWT: 0.31        Doppler Measurements & Calculations  MV E max navid: 77.1 cm/sec  MV A max navid: 57.9 cm/sec  MV E/A: 1.3  MV dec time: 0.17 sec     Ao V2 max: 133.9 cm/sec  Ao max P.0 mmHg  Ao V2 mean: 94.5 cm/sec  Ao mean P.0 mmHg  Ao V2 VTI: 30.3 cm  FADUMO(I,D): 3.0 cm2  FADUMO(V,D): 3.0 cm2  LV V1 max P.8 mmHg  LV V1 max: 109.9 cm/sec  LV V1 VTI: 24.5 cm  SV(LVOT): 89.4 ml  SI(LVOT): 44.0 ml/m2  PA acc time: 0.15 sec  Pulm Sys Navid: 60.6 cm/sec  Pulm Heller Navid: 43.0 cm/sec  Pulm S/D:  1.4  AV Navid Ratio (DI): 0.82  FADUMO Index (cm2/m2): 1.5  E/E' av.9  Lateral E/e': 7.9  Medial E/e': 10.0              _____________________________________________________________________________  __        Report approved by: Fan Park 2019 01:14 PM

## 2019-12-07 NOTE — PLAN OF CARE
PRIMARY DIAGNOSIS: CHEST PAIN  OUTPATIENT/OBSERVATION GOALS TO BE MET BEFORE DISCHARGE:  1. Ruled out acute coronary syndrome (negative or stable Troponin):  Yes  2. Pain Status: Pain free.  3. Appropriate provocative testing performed: No  - Stress Test Procedure: Echo- pending  - Interpretation of cardiac rhythm per telemetry tech: SR    4. Cleared by Consultants (if applicable): No  5. Return to near baseline physical activity: Yes  Discharge Planner Nurse   Safe discharge environment identified: Yes  Barriers to discharge: No       Entered by: Katie Allen 12/07/2019 0800     Please review provider order for any additional goals.   Nurse to notify provider when observation goals have been met and patient is ready for discharge.  A

## 2019-12-07 NOTE — PLAN OF CARE
PRIMARY DIAGNOSIS: CHEST PAIN  OUTPATIENT/OBSERVATION GOALS TO BE MET BEFORE DISCHARGE:  1. Ruled out acute coronary syndrome (negative or stable Troponin):  Yes  2. Pain Status: Pain free.  3. Appropriate provocative testing performed: Yes  - Stress Test Procedure: Echo done. Results pending  - Interpretation of cardiac rhythm per telemetry tech: SR    4. Cleared by Consultants (if applicable): No  5. Return to near baseline physical activity: Yes  Discharge Planner Nurse   Safe discharge environment identified: Yes  Barriers to discharge: No       Entered by: Katie Allen 12/07/2019 1200     Please review provider order for any additional goals.   Nurse to notify provider when observation goals have been met and patient is ready for discharge.  A&Ox4, denies chest pain or SOB. Tele- NSR. Regular diet. Echo done pending results. Cardiology pending echo results. Pt verbalize readiness for discharge at this time.

## 2019-12-07 NOTE — PLAN OF CARE
Care Plan Summary Note: Pt adequate for discharge.  AVS discharge teaching done to pt & family, new and PTA medication, f/u appointments discussed. Questioned answered, verbalized understanding. Belonging given. Requested copies of EKG given. Family to transport home.

## 2020-01-30 PROBLEM — C64.1 RENAL CELL CARCINOMA, RIGHT (H): Status: ACTIVE | Noted: 2019-05-01

## 2020-02-26 ENCOUNTER — ONCOLOGY VISIT (OUTPATIENT)
Dept: ONCOLOGY | Facility: CLINIC | Age: 55
End: 2020-02-26
Attending: UROLOGY
Payer: COMMERCIAL

## 2020-02-26 ENCOUNTER — HOSPITAL ENCOUNTER (OUTPATIENT)
Dept: CT IMAGING | Facility: CLINIC | Age: 55
Discharge: HOME | End: 2020-02-26
Attending: UROLOGY | Admitting: UROLOGY
Payer: COMMERCIAL

## 2020-02-26 ENCOUNTER — HOSPITAL ENCOUNTER (OUTPATIENT)
Facility: CLINIC | Age: 55
Setting detail: SPECIMEN
End: 2020-02-26
Attending: UROLOGY
Payer: COMMERCIAL

## 2020-02-26 VITALS
WEIGHT: 195 LBS | TEMPERATURE: 98.1 F | SYSTOLIC BLOOD PRESSURE: 121 MMHG | RESPIRATION RATE: 16 BRPM | OXYGEN SATURATION: 98 % | HEART RATE: 63 BPM | BODY MASS INDEX: 30.54 KG/M2 | DIASTOLIC BLOOD PRESSURE: 91 MMHG

## 2020-02-26 DIAGNOSIS — C64.1 RENAL CELL CARCINOMA, RIGHT (H): Primary | ICD-10-CM

## 2020-02-26 DIAGNOSIS — C64.1 RENAL CELL CARCINOMA, RIGHT (H): ICD-10-CM

## 2020-02-26 DIAGNOSIS — E78.5 HYPERLIPIDEMIA, UNSPECIFIED HYPERLIPIDEMIA TYPE: ICD-10-CM

## 2020-02-26 PROBLEM — N28.89 RIGHT RENAL MASS: Status: RESOLVED | Noted: 2019-04-24 | Resolved: 2020-02-26

## 2020-02-26 LAB
ANION GAP SERPL CALCULATED.3IONS-SCNC: 3 MMOL/L (ref 3–14)
BUN SERPL-MCNC: 21 MG/DL (ref 7–30)
CALCIUM SERPL-MCNC: 9.1 MG/DL (ref 8.5–10.1)
CHLORIDE SERPL-SCNC: 105 MMOL/L (ref 94–109)
CHOLEST SERPL-MCNC: 233 MG/DL
CO2 SERPL-SCNC: 30 MMOL/L (ref 20–32)
CREAT SERPL-MCNC: 1.98 MG/DL (ref 0.66–1.25)
GFR SERPL CREATININE-BSD FRML MDRD: 37 ML/MIN/{1.73_M2}
GLUCOSE SERPL-MCNC: 110 MG/DL (ref 70–99)
HDLC SERPL-MCNC: 44 MG/DL
LDLC SERPL CALC-MCNC: 169 MG/DL
NONHDLC SERPL-MCNC: 189 MG/DL
POTASSIUM SERPL-SCNC: 4.6 MMOL/L (ref 3.4–5.3)
SODIUM SERPL-SCNC: 138 MMOL/L (ref 133–144)
TRIGL SERPL-MCNC: 101 MG/DL

## 2020-02-26 PROCEDURE — 99213 OFFICE O/P EST LOW 20 MIN: CPT | Performed by: UROLOGY

## 2020-02-26 PROCEDURE — 80061 LIPID PANEL: CPT | Performed by: UROLOGY

## 2020-02-26 PROCEDURE — 36415 COLL VENOUS BLD VENIPUNCTURE: CPT

## 2020-02-26 PROCEDURE — 74176 CT ABD & PELVIS W/O CONTRAST: CPT

## 2020-02-26 PROCEDURE — G0463 HOSPITAL OUTPT CLINIC VISIT: HCPCS

## 2020-02-26 PROCEDURE — 80048 BASIC METABOLIC PNL TOTAL CA: CPT | Performed by: UROLOGY

## 2020-02-26 ASSESSMENT — PAIN SCALES - GENERAL: PAINLEVEL: NO PAIN (0)

## 2020-02-26 NOTE — NURSING NOTE
"Oncology Rooming Note    February 26, 2020 2:10 PM   Grayson Nam is a 54 year old male who presents for:    Chief Complaint   Patient presents with     Oncology Clinic Visit     Renal cell carcinoma, right     Initial Vitals: BP (!) 121/91   Pulse 63   Temp 98.1  F (36.7  C) (Oral)   Resp 16   Wt 88.5 kg (195 lb)   SpO2 98%   BMI 30.54 kg/m   Estimated body mass index is 30.54 kg/m  as calculated from the following:    Height as of 12/6/19: 1.702 m (5' 7\").    Weight as of this encounter: 88.5 kg (195 lb). Body surface area is 2.05 meters squared.  No Pain (0) Comment: Data Unavailable   No LMP for male patient.  Allergies reviewed: Yes  Medications reviewed: Yes    Medications: Medication refills not needed today.  Pharmacy name entered into [facility]: [id number] [address] AT [facility] [address]    Clinical concerns: f/u       Lima Nice CMA              "

## 2020-02-26 NOTE — LETTER
2/26/2020         RE: Grayson Nam  [address]  [place] [address]        Dear Colleague,    Thank you for referring your patient, Grayson Nam, to the [facility]. Please see a copy of my visit note below.    Medical Assistant Note:  Grayson Nam presents today for blood draw.    Patient seen by provider today: No.   present during visit today: Not Applicable.    Concerns: No Concerns.    Procedure:  Lab draw site: blood draw, Needle type: butterfly, Gauge: 23.    Post Assessment:  Labs drawn without difficulty: Yes.    Discharge Plan:  Departure Mode: Ambulatory.    Face to Face Time: 10.    Lima Nice CMA            Again, thank you for allowing me to participate in the care of your patient.        Sincerely,        [facility]

## 2020-02-26 NOTE — PROGRESS NOTES
CHIEF COMPLAINT   It was my pleasure to see Grayson Nam who is a 54 year old male for follow-up of renal cell carcinoma.      HPI  Grayson Nam is a very pleasant 54 year old male who presents with a history of right renal cell carcinoma. He is s/p right lap nephrectomy 5/1/2019. He has recovered well, without complication.      CT abd/pelvis 2/26/2020  IMPRESSION:   1.  Right nephrectomy.  2.  No evidence of recurrent or metastatic disease in the abdomen or  pelvis.     Right Radical Nephrectomy 5/1/2019  FINAL DIAGNOSIS:   Right kidney, radical nephrectomy   - Chromophobe renal cell carcinoma   - Tumor size: 10.0 x 7.5 x 6.5 cm   - Margins: Negative for tumor   - Renal sinus negative for tumor invasion   - No evidence of renal vein involvement   - No Lymphovascular space invasion identified   - No adrenal gland present   - pT2a, pNX      ##Kidney Follow-up##  Patient is status post Laparoscopic Nephrectomy on 5/1/2019.   Tumor was on the right side.   Maximal tumor dimension was 10.0 cm.    The histologic subtype was Chromophobe.    ISUP Grade 1.    Pathologic Stage T2a.    Tumor thrombus level  not applicable.    Tumor necrosis present: Yes.  Sarcomatoid features present: No.  Lymph Nodes Removed No.   Number of nodes removed 0, number of node positive for cancer 0.   Was the ipsilateral adrenal gland removed? No.  Neoadjuvant Chemotherapy? No.  Was Margin Positive? No.     There were not deviations from a normal post-operative course or complications?.     The patient was not readmitted to the hospital since post-operative discharge.    PHYSICAL EXAM  Patient is a 54 year old  male   Vitals: Blood pressure (!) 121/91, pulse 63, temperature 98.1  F (36.7  C), temperature source Oral, resp. rate 16, weight 88.5 kg (195 lb), SpO2 98 %.  General Appearance Adult: Body mass index is 30.54 kg/m .  Alert, no acute distress, oriented  HENT: throat/mouth:normal, good dentition  Lungs: no respiratory  distress, or pursed lip breathing  Heart: No obvious jugular venous distension present  Abdomen: soft, nontender, no organomegaly or masses; incision well-healed  Back: no CVAT  Musculoskeltal: extremities normal, no peripheral edema  Skin: no suspicious lesions or rashes  Neuro: Alert, oriented, speech and mentation normal  Psych: affect and mood normal  Gait: Normal    ASSESSMENT and PLAN  54 year old male with pT2a renal cell carcinoma, chromophobe, s/p right lap radical nephrectomy 5/1/2019. We dicussed this pathologic finding in detail, and plans for subsequent disease surveillance. Will plan CT, CXR, and labs in 6 months. We also discussed this creatinine levels and that it is expected for them increase some following surgery, but that this should level out at a new baseline.      - Follow-up 6 months with CT and labs    I spent over 15 minutes with the patient.  Over half this time was spent on counseling regarding renal cell carcinoma.    Lino Esparza MD  Urology  [facility]

## 2020-02-26 NOTE — PROGRESS NOTES
Medical Assistant Note:  Grayson Nam presents today for blood draw.    Patient seen by provider today: No.   present during visit today: Not Applicable.    Concerns: No Concerns.    Procedure:  Lab draw site: blood draw, Needle type: butterfly, Gauge: 23.    Post Assessment:  Labs drawn without difficulty: Yes.    Discharge Plan:  Departure Mode: Ambulatory.    Face to Face Time: 10.    Lima Nice, CMA

## 2020-03-02 ENCOUNTER — HEALTH MAINTENANCE LETTER (OUTPATIENT)
Age: 55
End: 2020-03-02

## 2020-05-29 ENCOUNTER — APPOINTMENT (OUTPATIENT)
Age: 55
Setting detail: DERMATOLOGY
End: 2020-05-29

## 2020-05-29 DIAGNOSIS — L82.1 OTHER SEBORRHEIC KERATOSIS: ICD-10-CM

## 2020-05-29 DIAGNOSIS — L57.8 OTHER SKIN CHANGES DUE TO CHRONIC EXPOSURE TO NONIONIZING RADIATION: ICD-10-CM

## 2020-05-29 DIAGNOSIS — L82.0 INFLAMED SEBORRHEIC KERATOSIS: ICD-10-CM

## 2020-05-29 PROBLEM — D48.5 NEOPLASM OF UNCERTAIN BEHAVIOR OF SKIN: Status: ACTIVE | Noted: 2020-05-29

## 2020-05-29 PROCEDURE — 99202 OFFICE O/P NEW SF 15 MIN: CPT | Mod: 25

## 2020-05-29 PROCEDURE — 11311 SHAVE SKIN LESION 0.6-1.0 CM: CPT

## 2020-05-29 PROCEDURE — OTHER COUNSELING: OTHER

## 2020-05-29 PROCEDURE — OTHER SHAVE REMOVAL: OTHER

## 2020-05-29 ASSESSMENT — LOCATION SIMPLE DESCRIPTION DERM
LOCATION SIMPLE: RIGHT CHEEK
LOCATION SIMPLE: LEFT UPPER BACK
LOCATION SIMPLE: RIGHT CHEEK

## 2020-05-29 ASSESSMENT — LOCATION DETAILED DESCRIPTION DERM
LOCATION DETAILED: RIGHT SUPERIOR PREAURICULAR CHEEK
LOCATION DETAILED: RIGHT SUPERIOR LATERAL MALAR CHEEK
LOCATION DETAILED: LEFT MEDIAL UPPER BACK

## 2020-05-29 ASSESSMENT — LOCATION ZONE DERM
LOCATION ZONE: FACE
LOCATION ZONE: TRUNK
LOCATION ZONE: FACE

## 2020-05-29 NOTE — PROCEDURE: SHAVE REMOVAL
Medical Necessity Information: It is in your best interest to select a reason for this procedure from the list below. All of these items fulfill various CMS LCD requirements except the new and changing color options.
Medical Necessity Clause: This procedure was medically necessary because the lesion that was treated was:
Wound Care: Petrolatum
Biopsy Method: Dermablade
Hemostasis: Drysol
Bill 03569 For Specimen Handling/Conveyance To Laboratory?: no
Was A Bandage Applied: Yes
Post-Care Instructions: I reviewed with the patient in detail post-care instructions. Patient is to keep the biopsy site dry overnight, and then apply bacitracin twice daily until healed. Patient may apply hydrogen peroxide soaks to remove any crusting.
Notification Instructions: Patient will be notified of biopsy results. However, patient instructed to call the office if not contacted within 2 weeks.
X Size Of Lesion In Cm (Optional): 0
Anesthesia Type: 1% lidocaine with epinephrine
Size Of Lesion In Cm (Required): 0.6
Detail Level: Detailed
Billing Type: Third-Party Bill
Consent was obtained from the patient. The risks and benefits to therapy were discussed in detail. Specifically, the risks of infection, scarring, bleeding, prolonged wound healing, incomplete removal, allergy to anesthesia, nerve injury and recurrence were addressed. Prior to the procedure, the treatment site was clearly identified and confirmed by the patient. All components of Universal Protocol/PAUSE Rule completed.

## 2020-06-09 ENCOUNTER — APPOINTMENT (OUTPATIENT)
Age: 55
Setting detail: DERMATOLOGY
End: 2020-06-09

## 2020-06-09 DIAGNOSIS — L57.8 OTHER SKIN CHANGES DUE TO CHRONIC EXPOSURE TO NONIONIZING RADIATION: ICD-10-CM

## 2020-06-09 PROBLEM — C44.329 SQUAMOUS CELL CARCINOMA OF SKIN OF OTHER PARTS OF FACE: Status: ACTIVE | Noted: 2020-06-09

## 2020-06-09 PROCEDURE — 17311 MOHS 1 STAGE H/N/HF/G: CPT

## 2020-06-09 PROCEDURE — 99213 OFFICE O/P EST LOW 20 MIN: CPT | Mod: 25

## 2020-06-09 PROCEDURE — OTHER COUNSELING: OTHER

## 2020-06-09 PROCEDURE — 13131 CMPLX RPR F/C/C/M/N/AX/G/H/F: CPT

## 2020-06-09 PROCEDURE — OTHER MOHS BY LAYER: OTHER

## 2020-06-09 ASSESSMENT — LOCATION SIMPLE DESCRIPTION DERM: LOCATION SIMPLE: RIGHT CHEEK

## 2020-06-09 ASSESSMENT — LOCATION DETAILED DESCRIPTION DERM: LOCATION DETAILED: RIGHT INFERIOR CENTRAL MALAR CHEEK

## 2020-06-09 ASSESSMENT — LOCATION ZONE DERM: LOCATION ZONE: FACE

## 2020-06-09 NOTE — PROCEDURE: MOHS BY LAYER
Stage 2: Number Of Sections (Blocks) ?: 0
Detail Level: Detailed
Stage 1: Depth Of Layer: adipose tissue
Vermilion Border Text: The closure involved the vermilion border.
Estimated Blood Loss (Cc): minimal
Consent: The rationale for Mohs was explained to the patient and consent was obtained. The risks, benefits and alternatives to therapy were discussed in detail. Specifically, the risks of infection, scarring, bleeding, prolonged wound healing, incomplete removal, allergy to anesthesia, nerve injury and recurrence were addressed. Prior to the procedure, the treatment site was clearly identified and confirmed by the patient. All components of Universal Protocol/PAUSE Rule completed.
Distance Of Undermining In Cm (Required): 1
Debridement Text: The wound edges were debrided prior to proceeding with the closure to facilitate wound healing.
Stage 1: Defect Y-Dimension: 1.1
Simple / Intermediate / Complex Repair - Final Wound Length In Cm: 1.8
Retention Suture Text: Retention sutures were placed to support the closure and prevent dehiscence.
Body Location Override (Optional - Billing Will Still Be Based On Selected Body Map Location If Applicable): Right Lateral Superior Malar Cheek
Complex Requirements: Debridement Of Wound Edges?: No
Epidermal Sutures: 6-0 Ethilon
Helical Rim Text: The closure involved the helical rim.
Hemostasis: Electrocautery
Stage 1: Number Of Sections (Blocks) ?: 2
Suture Removal: 7 days
Deep Sutures: 5-0 Vicryl
Complex Requirements: Extensive Undermining Performed?: Yes
Undermining Type: Entire Wound
Repair Type: Complex Repair
Wound Care: Petrolatum
Epidermal Closure: simple interrupted
Nostril Rim Text: The closure involved the nostril rim.
Anesthesia Type: 1% lidocaine with epinephrine
Post-Care Instructions: I reviewed with the patient in detail post-care instructions. Patient is not to engage in any heavy lifting, exercise, or swimming for the next 14 days. Should the patient develop any fevers, chills, bleeding, severe pain patient will contact the office immediately.

## 2020-06-16 ENCOUNTER — APPOINTMENT (OUTPATIENT)
Age: 55
Setting detail: DERMATOLOGY
End: 2020-06-16

## 2020-06-16 DIAGNOSIS — Z48.02 ENCOUNTER FOR REMOVAL OF SUTURES: ICD-10-CM

## 2020-06-16 PROCEDURE — OTHER SUTURE REMOVAL (GLOBAL PERIOD): OTHER

## 2020-06-16 ASSESSMENT — LOCATION ZONE DERM: LOCATION ZONE: FACE

## 2020-06-16 ASSESSMENT — LOCATION DETAILED DESCRIPTION DERM: LOCATION DETAILED: RIGHT CENTRAL ZYGOMA

## 2020-06-16 ASSESSMENT — LOCATION SIMPLE DESCRIPTION DERM: LOCATION SIMPLE: RIGHT ZYGOMA

## 2020-06-16 NOTE — PROCEDURE: SUTURE REMOVAL (GLOBAL PERIOD)
Detail Level: Simple
Body Location Override (Optional - Billing Will Still Be Based On Selected Body Map Location If Applicable): right lateral superior malar cheek
Add 97502 Cpt? (Important Note: In 2017 The Use Of 31402 Is Being Tracked By Cms To Determine Future Global Period Reimbursement For Global Periods): no

## 2020-07-21 ENCOUNTER — VIRTUAL VISIT (OUTPATIENT)
Dept: FAMILY MEDICINE | Facility: OTHER | Age: 55
End: 2020-07-21
Payer: COMMERCIAL

## 2020-07-21 DIAGNOSIS — Z20.822 SUSPECTED COVID-19 VIRUS INFECTION: Primary | ICD-10-CM

## 2020-07-21 PROCEDURE — 99421 OL DIG E/M SVC 5-10 MIN: CPT | Performed by: FAMILY MEDICINE

## 2020-07-21 PROCEDURE — U0003 INFECTIOUS AGENT DETECTION BY NUCLEIC ACID (DNA OR RNA); SEVERE ACUTE RESPIRATORY SYNDROME CORONAVIRUS 2 (SARS-COV-2) (CORONAVIRUS DISEASE [COVID-19]), AMPLIFIED PROBE TECHNIQUE, MAKING USE OF HIGH THROUGHPUT TECHNOLOGIES AS DESCRIBED BY CMS-2020-01-R: HCPCS | Performed by: FAMILY MEDICINE

## 2020-07-21 NOTE — LETTER
July 24, 2020        rGayson YOLANDA Cyril  [address]  [place] [address]    This letter provides a written record that you were tested for COVID-19 on 7/21/20.       Your result was negative. This means that we didn t find the virus that causes COVID-19 in your sample. A test may show negative when you do actually have the virus. This can happen when the virus is in the early stages of infection, before you feel illness symptoms.    If you have symptoms   Stay home and away from others (self-isolate) until you meet ALL of the guidelines below:    You ve had no fever--and no medicine that reduces fever--for 3 full days (72 hours). And      Your other symptoms have gotten better. For example, your cough or breathing has improved. And     At least 10 days have passed since your symptoms started.    During this time:    Stay home. Don t go to work, school or anywhere else.     Stay in your own room, including for meals. Use your own bathroom if you can.    Stay away from others in your home. No hugging, kissing or shaking hands. No visitors.    Clean  high touch  surfaces often (doorknobs, counters, handles, etc.). Use a household cleaning spray or wipes. You can find a full list on the EPA website at www.epa.gov/pesticide-registration/list-n-disinfectants-use-against-sars-cov-2.    Cover your mouth and nose with a mask, tissue or washcloth to avoid spreading germs.    Wash your hands and face often with soap and water.    Going back to work  Check with your employer for any guidelines to follow for going back to work.    Employers: This document serves as formal notice that your employee tested negative for COVID-19, as of the testing date shown above.

## 2020-07-21 NOTE — PROGRESS NOTES
"Date: 2020 10:22:02  Clinician: Chaka Cavazos  Clinician NPI: [id number]  Patient: Grayson Nam  Patient : 1965  Patient Address: [address] [place] [address] [place] [address]  Patient Phone: [phone]  Visit Protocol: URI  Patient Summary:  Grayson is a 55 year old ( : 1965 ) male who initiated a Visit for COVID-19 (Coronavirus) evaluation and screening. When asked the question \"Please sign me up to receive news, health information and promotions from [facility].\", Grayson responded \"No\".    Grayson states his symptoms started 1-2 days ago.   His symptoms consist of rhinitis, malaise, and a sore throat. He is experiencing mild difficulty breathing with activities but can speak normally in full sentences.   Symptom details     Nasal secretions: The color of his mucus is clear.    Sore throat: Grayson reports having moderate throat pain (4-6 on a 10 point pain scale), does not have exudate on his tonsils, and can swallow liquids. The lymph nodes in his neck are not enlarged. A rash has not appeared on the skin since the sore throat started.      Grayson denies having wheezing, nausea, teeth pain, ageusia, diarrhea, vomiting, ear pain, headache, chills, enlarged lymph nodes, myalgias, anosmia, facial pain or pressure, fever, cough, and nasal congestion. He also denies having recent facial or sinus surgery in the past 60 days and taking antibiotic medication in the past month.   Precipitating events  Within the past week, Grayson has not been exposed to someone with strep throat.   Pertinent COVID-19 (Coronavirus) information  In the past 14 days, Grayson has not worked in a congregate living setting.   He does not work or volunteer as healthcare worker or a  and does not work or volunteer in a healthcare facility.   Grayson also has not lived in a congregate living setting in the past 14 days. He does not live with a healthcare worker.   Grayson has not had a " close contact with a laboratory-confirmed COVID-19 patient within 14 days of symptom onset.   Pertinent medical history  Grayson does not need a return to work/school note.   Weight: 198 lbs   Grayson does not smoke or use smokeless tobacco.   Additional information as reported by the patient (free text): Right kidney removed (cancer). Chronic kidney disease.   Weight: 198 lbs    MEDICATIONS: amlodipine oral, bupropion HCl oral, ALLERGIES: NKDA  Clinician Response:  Dear Grayson,   Your symptoms show that you may have coronavirus (COVID-19). This illness can cause fever, cough and trouble breathing. Many people get a mild case and get better on their own. Some people can get very sick.  What should I do?  We would like to test you for this virus.   1. Please call [phone] to schedule your visit. Explain that you were referred by [facility] to have a COVID-19 test. Be ready to [facility].  The following will serve as your written order for this COVID Test, ordered by me, for the indication of suspected COVID [Z20.828]: The test will be ordered in [employer], our electronic health record, after you are scheduled. It will show as ordered and authorized by Jim Velasco MD.  Order: COVID-19 (Coronavirus) PCR for SYMPTOMATIC testing from [facility].      2. When it's time for your COVID test:  Stay at least 6 feet away from others. (If someone will drive you to your test, stay in the backseat, as far away from the  as you can.)   Cover your mouth and nose with a mask, tissue or washcloth.  Go straight to the testing site. Don't make any stops on the way there or back.      3.Starting now: Stay home and away from others (self-isolate) until:   You've had no fever---and no medicine that reduces fever---for 3 full days (72 hours). And...   Your other symptoms have gotten better. For example, your cough or breathing has improved. And...   At least 10 days have passed since your symptoms started.        "During this time, don't leave the house except for testing or medical care.   Stay in your own room, even for meals. Use your own bathroom if you can.   Stay away from others in your home. No hugging, kissing or shaking hands. No visitors.  Don't go to work, school or anywhere else.    Clean \"high touch\" surfaces often (doorknobs, counters, handles, etc.). Use a household cleaning spray or wipes. You'll find a full list of  on the EPA website: www.epa.gov/pesticide-registration/list-n-disinfectants-use-against-sars-cov-2.   Cover your mouth and nose with a mask, tissue or washcloth to avoid spreading germs.  Wash your hands and face often. Use soap and water.  Caregivers in these groups are at risk for severe illness due to COVID-19:  o People 65 years and older  o People who live in a nursing home or long-term care facility  o People with chronic disease (lung, heart, cancer, diabetes, kidney, liver, immunologic)  o People who have a weakened immune system, including those who:   Are in cancer treatment  Take medicine that weakens the immune system, such as corticosteroids  Had a bone marrow or organ transplant  Have an immune deficiency  Have poorly controlled HIV or AIDS  Are obese (body mass index of 40 or higher)  Smoke regularly   o Caregivers should wear gloves while washing dishes, handling laundry and cleaning bedrooms and bathrooms.  o Use caution when washing and drying laundry: Don't shake dirty laundry, and use the warmest water setting that you can.  o For more tips, go to www.cdc.gov/coronavirus/2019-ncov/downloads/10Things.pdf.    4.Sign up for [employer]. We know it's scary to hear that you might have COVID-19. We want to track your symptoms to make sure you're okay over the next 2 weeks. Please look for an email from [employer]---this is a free, online program that we'll use to keep in touch. To sign up, follow the link in the email. Learn more at [email or web address]  How " can I take care of myself?   Get lots of rest. Drink extra fluids (unless a doctor has told you not to).   Take Tylenol (acetaminophen) for fever or pain. If you have liver or kidney problems, ask your family doctor if it's okay to take Tylenol.   Adults can take either:    650 mg (two 325 mg pills) every 4 to 6 hours, or...   1,000 mg (two 500 mg pills) every 8 hours as needed.    Note: Don't take more than 3,000 mg in one day. Acetaminophen is found in many medicines (both prescribed and over-the-counter medicines). Read all labels to be sure you don't take too much.   For children, check the Tylenol bottle for the right dose. The dose is based on the child's age or weight.    If you have other health problems (like cancer, heart failure, an organ transplant or severe kidney disease): Call your specialty clinic if you don't feel better in the next 2 days.       Know when to call 911. Emergency warning signs include:    Trouble breathing or shortness of breath Pain or pressure in the chest that doesn't go away Feeling confused like you haven't felt before, or not being able to wake up Bluish-colored lips or face.  Where can I get more information?   [facility] -- About COVID-19: [email or web address]/   CDC -- What to Do If You're Sick: www.cdc.gov/coronavirus/2019-ncov/about/steps-when-sick.html   CDC -- Ending Home Isolation: www.cdc.gov/coronavirus/2019-ncov/hcp/disposition-in-home-patients.html   CDC -- Caring for Someone: www.cdc.gov/coronavirus/2019-ncov/if-you-are-sick/care-for-someone.html   [facility] -- Interim Guidance for Hospital Discharge to Home: www.health.[facility].mn.us/diseases/coronavirus/hcp/hospdischarge.pdf   [facility] clinical trials (COVID-19 research studies): clinicalaffairs.[facility]/umn-clinical-trials    Below are the COVID-19 hotlines at the [place] Department of Health ([facility]). Interpreters are available.    For health questions: Call 022-032-4038 or 1-314.588.3961  (7 a.m. to 7 p.m.) For questions about schools and childcare: Call 552-886-3270 or 1-303.584.4050 (7 a.m. to 7 p.m.)    Diagnosis: Other malaise  Diagnosis ICD: R53.81

## 2020-07-22 LAB
SARS-COV-2 RNA SPEC QL NAA+PROBE: NOT DETECTED
SPECIMEN SOURCE: NORMAL

## 2020-07-27 ENCOUNTER — DOCUMENTATION ONLY (OUTPATIENT)
Dept: CARE COORDINATION | Facility: CLINIC | Age: 55
End: 2020-07-27

## 2020-09-09 ENCOUNTER — PRE VISIT (OUTPATIENT)
Dept: UROLOGY | Facility: CLINIC | Age: 55
End: 2020-09-09

## 2020-09-09 NOTE — TELEPHONE ENCOUNTER
Chief Complaint : Follow up - 6 Months    Hx/Sx: Renal cell carcinoma (s/p right lap nephrectomy 5/2019)    Records/Orders: CT, CXR, BMP, CBC scheduled    Pt Contacted: N/a    At Rooming: Sandy Brar EMT

## 2020-09-16 ENCOUNTER — OFFICE VISIT (OUTPATIENT)
Dept: UROLOGY | Facility: CLINIC | Age: 55
End: 2020-09-16
Payer: COMMERCIAL

## 2020-09-16 ENCOUNTER — ANCILLARY PROCEDURE (OUTPATIENT)
Dept: CT IMAGING | Facility: CLINIC | Age: 55
End: 2020-09-16
Attending: UROLOGY
Payer: COMMERCIAL

## 2020-09-16 VITALS — HEART RATE: 78 BPM | DIASTOLIC BLOOD PRESSURE: 84 MMHG | SYSTOLIC BLOOD PRESSURE: 121 MMHG

## 2020-09-16 DIAGNOSIS — C64.1 RENAL CELL CARCINOMA, RIGHT (H): Primary | ICD-10-CM

## 2020-09-16 DIAGNOSIS — C64.1 RENAL CELL CARCINOMA, RIGHT (H): ICD-10-CM

## 2020-09-16 LAB
ANION GAP SERPL CALCULATED.3IONS-SCNC: 6 MMOL/L (ref 3–14)
BUN SERPL-MCNC: 21 MG/DL (ref 7–30)
CALCIUM SERPL-MCNC: 9 MG/DL (ref 8.5–10.1)
CHLORIDE SERPL-SCNC: 107 MMOL/L (ref 94–109)
CO2 SERPL-SCNC: 27 MMOL/L (ref 20–32)
CREAT SERPL-MCNC: 1.9 MG/DL (ref 0.66–1.25)
ERYTHROCYTE [DISTWIDTH] IN BLOOD BY AUTOMATED COUNT: 12.5 % (ref 10–15)
GFR SERPL CREATININE-BSD FRML MDRD: 38 ML/MIN/{1.73_M2}
GLUCOSE SERPL-MCNC: 98 MG/DL (ref 70–99)
HCT VFR BLD AUTO: 46.6 % (ref 40–53)
HGB BLD-MCNC: 15.2 G/DL (ref 13.3–17.7)
MCH RBC QN AUTO: 30.2 PG (ref 26.5–33)
MCHC RBC AUTO-ENTMCNC: 32.6 G/DL (ref 31.5–36.5)
MCV RBC AUTO: 93 FL (ref 78–100)
PLATELET # BLD AUTO: 247 10E9/L (ref 150–450)
POTASSIUM SERPL-SCNC: 4.6 MMOL/L (ref 3.4–5.3)
RBC # BLD AUTO: 5.03 10E12/L (ref 4.4–5.9)
SODIUM SERPL-SCNC: 139 MMOL/L (ref 133–144)
WBC # BLD AUTO: 6.6 10E9/L (ref 4–11)

## 2020-09-16 ASSESSMENT — PAIN SCALES - GENERAL: PAINLEVEL: NO PAIN (0)

## 2020-09-16 NOTE — NURSING NOTE
Chief Complaint   Patient presents with     RECHECK     Kidney mass follow up        Blood pressure 121/84, pulse 78. There is no height or weight on file to calculate BMI.    Patient Active Problem List   Diagnosis     Diverticulosis of sigmoid colon     CKD (chronic kidney disease) stage 2, GFR 60-89 ml/min     Adjustment disorder with anxiety     Essential hypertension     Hyperlipidemia     Proteinuria     Renal cell carcinoma, right (H)     Brugada syndrome     H/O colonoscopy     Vitamin D deficiency     Chest pain       Allergies   Allergen Reactions     Lisinopril Cough     Other reaction(s): Cough  Other reaction(s): Cough       Animal Dander      Atorvastatin      Novocain [Procaine]        Current Outpatient Medications   Medication Sig Dispense Refill     acetaminophen (TYLENOL) 325 MG tablet Take 325-650 mg by mouth every 6 hours as needed for mild pain       albuterol (PROAIR HFA/PROVENTIL HFA/VENTOLIN HFA) 108 (90 Base) MCG/ACT inhaler Inhale 1-2 puffs into the lungs every 6 hours as needed for shortness of breath / dyspnea or wheezing       amLODIPine (NORVASC) 2.5 MG tablet Take 2.5 mg by mouth At Bedtime   1     buPROPion (WELLBUTRIN) 75 MG tablet Take 150 mg by mouth 2 times daily        Flaxseed, Linseed, (FLAX SEEDS PO) Take 1 Tablespoonful by mouth daily       fluticasone-salmeterol (ADVAIR) 100-50 MCG/DOSE diskus inhaler Inhale 1 puff into the lungs 2 times daily as needed        Multiple Vitamins-Minerals (OCUVITE PRESERVISION PO)        vitamin D2 (ERGOCALCIFEROL) 69614 units (1250 mcg) capsule Take 50,000 Units by mouth every 14 days        losartan (COZAAR) 50 MG tablet Take 50 mg by mouth       omeprazole (PRILOSEC OTC) 20 MG EC tablet Take 1 tablet (20 mg) by mouth daily (Patient not taking: Reported on 2/26/2020)       pravastatin (PRAVACHOL) 20 MG tablet Take 20 mg by mouth         Social History     Tobacco Use     Smoking status: Never Smoker     Smokeless tobacco: Never Used    Substance Use Topics     Alcohol use: Yes     Alcohol/week: 1.0 - 2.0 standard drinks     Types: 1 - 2 Cans of beer per week     Comment: occas.     Drug use: No       Donna Giron CMA  9/16/2020  11:20 AM

## 2020-09-16 NOTE — PATIENT INSTRUCTIONS
Return to see Dr. Esparza in six months with a CT of the abdomen and pelvis, and lab work (CBC, BMP).  You can be seen in [place] if that is your preference.    It was a pleasure meeting with you today.  Thank you for allowing me and my team the privilege of caring for you today.  YOU are the reason we are here, and I truly hope we provided you with the excellent service you deserve.  Please let us know if there is anything else we can do for you so that we can be sure you are leaving completely satisfied with your care experience.        Donna Giron, CMA

## 2020-09-16 NOTE — PROGRESS NOTES
CHIEF COMPLAINT   It was my pleasure to see Grayson Nam who is a 55 year old male for follow-up of renal cell carcinoma.    HPI  Grayson Nam is a very pleasant 55 year old male who presents with a history of [id number] chromophobe right renal cell carcinoma, s/p laparoscopic nephrectomy 5/1/2019.  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    CT chest/abd/pelvis 9/16/2020  IMPRESSION: In this patient with history of renal cell carcinoma, post  right nephrectomy the current scan shows:  1. Postsurgical changes of right nephrectomy. No evidence of recurrent  or metastatic disease in the abdomen or pelvis.  2. Solid pulmonary nodule along the right major fissure in the right  lower lobe, not significantly changed compared to CT dated 4/16/2019 .  Recommend attention on follow-up.    PHYSICAL EXAM  Patient is a 55 year old  male   Vitals: Blood pressure 121/84, pulse 78.  General Appearance Adult: There is no height or weight on file to calculate BMI.  Alert, no acute distress, oriented  HENT: throat/mouth:normal, good dentition  Lungs: no respiratory distress, or pursed lip breathing  Heart: No obvious jugular venous distension present  Abdomen: soft, nontender, no organomegaly or masses  Back: No CVAT  Musculoskeltal: extremities normal, no peripheral edema  Skin: no suspicious lesions or rashes  Neuro: Alert, oriented, speech and mentation normal  Psych: affect and mood normal  Gait: Normal    Cr today 1.9 (from 1.98), GFR 38.   CBC unremarkable      ASSESSMENT and PLAN  55year old mail with pT2a chromophobe RCC, doing well s/p R laparoscopic nephrectomy 5/1/2019. Labs and imaging today unremarkable.    - follow up in 6 months with CT C/A/P, CBC, BMP    Seen and discussed with Dr. Vilma Smith MD  PGY2 Urology    Attestation:  This patient was seen and evaluated by me, with the resident taking notes  and acting as scribe.  I have reviewed the note above and agree.  The physical exam and or any procedures were performed by me and the pertinant details are outlined above.    I spent over 15 minutes with the patient.  Over half this time was spent on counseling.    Lino Esparza MD  Urology  [facility]

## 2020-10-28 ENCOUNTER — APPOINTMENT (OUTPATIENT)
Dept: URBAN - METROPOLITAN AREA CLINIC 259 | Age: 55
Setting detail: DERMATOLOGY
End: 2020-10-29

## 2020-10-28 DIAGNOSIS — D18.0 HEMANGIOMA: ICD-10-CM

## 2020-10-28 DIAGNOSIS — L82.1 OTHER SEBORRHEIC KERATOSIS: ICD-10-CM

## 2020-10-28 DIAGNOSIS — L57.8 OTHER SKIN CHANGES DUE TO CHRONIC EXPOSURE TO NONIONIZING RADIATION: ICD-10-CM

## 2020-10-28 DIAGNOSIS — D22 MELANOCYTIC NEVI: ICD-10-CM

## 2020-10-28 PROBLEM — D48.5 NEOPLASM OF UNCERTAIN BEHAVIOR OF SKIN: Status: ACTIVE | Noted: 2020-10-28

## 2020-10-28 PROBLEM — D18.01 HEMANGIOMA OF SKIN AND SUBCUTANEOUS TISSUE: Status: ACTIVE | Noted: 2020-10-28

## 2020-10-28 PROCEDURE — OTHER COUNSELING: OTHER

## 2020-10-28 PROCEDURE — 11301 SHAVE SKIN LESION 0.6-1.0 CM: CPT | Mod: 76

## 2020-10-28 PROCEDURE — 11301 SHAVE SKIN LESION 0.6-1.0 CM: CPT

## 2020-10-28 PROCEDURE — OTHER SHAVE REMOVAL: OTHER

## 2020-10-28 PROCEDURE — OTHER MIPS QUALITY: OTHER

## 2020-10-28 PROCEDURE — 99214 OFFICE O/P EST MOD 30 MIN: CPT | Mod: 25

## 2020-10-28 ASSESSMENT — LOCATION SIMPLE DESCRIPTION DERM
LOCATION SIMPLE: CHEST
LOCATION SIMPLE: ABDOMEN

## 2020-10-28 ASSESSMENT — LOCATION ZONE DERM: LOCATION ZONE: TRUNK

## 2020-10-28 ASSESSMENT — LOCATION DETAILED DESCRIPTION DERM
LOCATION DETAILED: EPIGASTRIC SKIN
LOCATION DETAILED: LEFT LATERAL INFERIOR CHEST
LOCATION DETAILED: LEFT MEDIAL SUPERIOR CHEST
LOCATION DETAILED: LEFT RIB CAGE
LOCATION DETAILED: RIGHT LATERAL INFERIOR CHEST

## 2020-10-28 NOTE — PROCEDURE: SHAVE REMOVAL
X Size Of Lesion In Cm (Optional): 0
Billing Type: Third-Party Bill
Render Post-Care Instructions In Note?: no
Was A Bandage Applied: Yes
Wound Care: Petrolatum
Size Of Lesion In Cm (Required): 0.6
Consent was obtained from the patient. The risks and benefits to therapy were discussed in detail. Specifically, the risks of infection, scarring, bleeding, prolonged wound healing, incomplete removal, allergy to anesthesia, nerve injury and recurrence were addressed. Prior to the procedure, the treatment site was clearly identified and confirmed by the patient. All components of Universal Protocol/PAUSE Rule completed.
Detail Level: Detailed
Biopsy Method: Dermablade
Notification Instructions: Patient will be notified of biopsy results. However, patient instructed to call the office if not contacted within 2 weeks.
Medical Necessity Information: It is in your best interest to select a reason for this procedure from the list below. All of these items fulfill various CMS LCD requirements except the new and changing color options.
Medical Necessity Clause: This procedure was medically necessary because the lesion that was treated was:
Path Notes (To The Dermatopathologist): Please check margins. atypical pigment network only on DermLite exam
Hemostasis: Drysol
Post-Care Instructions: I reviewed with the patient in detail post-care instructions. Patient is to keep the biopsy site dry overnight, and then apply bacitracin twice daily until healed. Patient may apply hydrogen peroxide soaks to remove any crusting.
Anesthesia Type: 1% lidocaine with epinephrine

## 2020-10-28 NOTE — PROCEDURE: MIPS QUALITY
Quality 226: Preventive Care And Screening: Tobacco Use: Screening And Cessation Intervention: Patient screened for tobacco use and is an ex/non-smoker
Detail Level: Detailed
Quality 110: Preventive Care And Screening: Influenza Immunization: Influenza Immunization Ordered or Recommended, but not Administered due to system reason
Quality 431: Preventive Care And Screening: Unhealthy Alcohol Use - Screening: Patient screened for unhealthy alcohol use using a single question and scores less than 2 times per year
Quality 111:Pneumonia Vaccination Status For Older Adults: Pneumococcal Vaccination not Administered or Previously Received, Reason not Otherwise Specified
Quality 130: Documentation Of Current Medications In The Medical Record: Current Medications Documented

## 2020-12-14 ENCOUNTER — HEALTH MAINTENANCE LETTER (OUTPATIENT)
Age: 55
End: 2020-12-14

## 2021-02-23 ENCOUNTER — PRE VISIT (OUTPATIENT)
Dept: UROLOGY | Facility: CLINIC | Age: 56
End: 2021-02-23

## 2021-02-23 NOTE — TELEPHONE ENCOUNTER
Visit Type : 6 month follow up with labs and imaging before     Records/Orders: labs and imaging before appointment     Pt Contacted: no    At Rooming: video

## 2021-03-05 ENCOUNTER — ANCILLARY PROCEDURE (OUTPATIENT)
Dept: CT IMAGING | Facility: CLINIC | Age: 56
End: 2021-03-05
Attending: UROLOGY
Payer: COMMERCIAL

## 2021-03-05 DIAGNOSIS — C64.1 RENAL CELL CARCINOMA, RIGHT (H): ICD-10-CM

## 2021-03-05 LAB
ANION GAP SERPL CALCULATED.3IONS-SCNC: 6 MMOL/L (ref 3–14)
BUN SERPL-MCNC: 18 MG/DL (ref 7–30)
CALCIUM SERPL-MCNC: 8.9 MG/DL (ref 8.5–10.1)
CHLORIDE SERPL-SCNC: 107 MMOL/L (ref 94–109)
CO2 SERPL-SCNC: 28 MMOL/L (ref 20–32)
CREAT SERPL-MCNC: 1.83 MG/DL (ref 0.66–1.25)
ERYTHROCYTE [DISTWIDTH] IN BLOOD BY AUTOMATED COUNT: 12.2 % (ref 10–15)
GFR SERPL CREATININE-BSD FRML MDRD: 40 ML/MIN/{1.73_M2}
GLUCOSE SERPL-MCNC: 98 MG/DL (ref 70–99)
HCT VFR BLD AUTO: 45.5 % (ref 40–53)
HGB BLD-MCNC: 15 G/DL (ref 13.3–17.7)
MCH RBC QN AUTO: 30.3 PG (ref 26.5–33)
MCHC RBC AUTO-ENTMCNC: 33 G/DL (ref 31.5–36.5)
MCV RBC AUTO: 92 FL (ref 78–100)
PLATELET # BLD AUTO: 221 10E9/L (ref 150–450)
POTASSIUM SERPL-SCNC: 4.7 MMOL/L (ref 3.4–5.3)
RBC # BLD AUTO: 4.95 10E12/L (ref 4.4–5.9)
SODIUM SERPL-SCNC: 141 MMOL/L (ref 133–144)
WBC # BLD AUTO: 6.1 10E9/L (ref 4–11)

## 2021-03-05 PROCEDURE — 80048 BASIC METABOLIC PNL TOTAL CA: CPT | Performed by: PATHOLOGY

## 2021-03-05 PROCEDURE — 36415 COLL VENOUS BLD VENIPUNCTURE: CPT | Performed by: PATHOLOGY

## 2021-03-05 PROCEDURE — 85027 COMPLETE CBC AUTOMATED: CPT | Performed by: PATHOLOGY

## 2021-03-05 PROCEDURE — 71250 CT THORAX DX C-: CPT | Performed by: RADIOLOGY

## 2021-03-05 PROCEDURE — 74176 CT ABD & PELVIS W/O CONTRAST: CPT | Performed by: RADIOLOGY

## 2021-03-11 ENCOUNTER — VIRTUAL VISIT (OUTPATIENT)
Dept: UROLOGY | Facility: CLINIC | Age: 56
End: 2021-03-11
Payer: COMMERCIAL

## 2021-03-11 DIAGNOSIS — C64.1 RENAL CELL CARCINOMA, RIGHT (H): Primary | ICD-10-CM

## 2021-03-11 DIAGNOSIS — N18.32 STAGE 3B CHRONIC KIDNEY DISEASE (H): ICD-10-CM

## 2021-03-11 PROBLEM — N18.30 CHRONIC KIDNEY DISEASE, STAGE 3 (H): Status: ACTIVE | Noted: 2021-03-11

## 2021-03-11 PROCEDURE — 99213 OFFICE O/P EST LOW 20 MIN: CPT | Mod: 95 | Performed by: UROLOGY

## 2021-03-11 ASSESSMENT — PAIN SCALES - GENERAL: PAINLEVEL: NO PAIN (0)

## 2021-03-11 NOTE — PATIENT INSTRUCTIONS
Please follow up in 12 month with a CT and labs before     It was a pleasure meeting with you today.  Thank you for allowing me and my team the privilege of caring for you today.  YOU are the reason we are here, and I truly hope we provided you with the excellent service you deserve.  Please let us know if there is anything else we can do for you so that we can be sure you are leaving completely satisfied with your care experience.

## 2021-03-11 NOTE — NURSING NOTE
Chief Complaint   Patient presents with     RECHECK     Renal Cell Carcinoma       There were no vitals taken for this visit. There is no height or weight on file to calculate BMI.    Patient Active Problem List   Diagnosis     Diverticulosis of sigmoid colon     CKD (chronic kidney disease) stage 2, GFR 60-89 ml/min     Adjustment disorder with anxiety     Essential hypertension     Hyperlipidemia     Proteinuria     Renal cell carcinoma, right (H)     Brugada syndrome     H/O colonoscopy     Vitamin D deficiency     Chest pain       Allergies   Allergen Reactions     Lisinopril Cough     Other reaction(s): Cough  Other reaction(s): Cough       Animal Dander      Atorvastatin      Novocain [Procaine]        Current Outpatient Medications   Medication Sig Dispense Refill     albuterol (PROAIR HFA/PROVENTIL HFA/VENTOLIN HFA) 108 (90 Base) MCG/ACT inhaler Inhale 1-2 puffs into the lungs every 6 hours as needed for shortness of breath / dyspnea or wheezing       Flaxseed, Linseed, (FLAX SEEDS PO) Take 1 Tablespoonful by mouth daily       fluticasone-salmeterol (ADVAIR) 100-50 MCG/DOSE diskus inhaler Inhale 1 puff into the lungs 2 times daily as needed        Multiple Vitamins-Minerals (OCUVITE PRESERVISION PO)        vitamin D2 (ERGOCALCIFEROL) 22247 units (1250 mcg) capsule Take 50,000 Units by mouth every 14 days        acetaminophen (TYLENOL) 325 MG tablet Take 325-650 mg by mouth every 6 hours as needed for mild pain       amLODIPine (NORVASC) 2.5 MG tablet Take 2.5 mg by mouth At Bedtime   1     buPROPion (WELLBUTRIN) 75 MG tablet Take 150 mg by mouth 2 times daily        losartan (COZAAR) 50 MG tablet Take 50 mg by mouth       omeprazole (PRILOSEC OTC) 20 MG EC tablet Take 1 tablet (20 mg) by mouth daily (Patient not taking: Reported on 2/26/2020)       pravastatin (PRAVACHOL) 20 MG tablet Take 20 mg by mouth         Social History     Tobacco Use     Smoking status: Never Smoker     Smokeless tobacco: Never Used    Substance Use Topics     Alcohol use: Yes     Alcohol/week: 1.0 - 2.0 standard drinks     Types: 1 - 2 Cans of beer per week     Comment: occas.     Drug use: No       Donna Giron CMA  3/11/2021  9:16 AM

## 2021-03-11 NOTE — PROGRESS NOTES
Grayson Nam is a 55 year old male who is being evaluated via a billable video visit.      How would you like to obtain your AVS? MyChart  If the video visit is dropped, the invitation should be resent by: Send to e-mail at: [email or web address]  Will anyone else be joining your video visit? No    Video Start Time: 10:07 AM    CHIEF COMPLAINT   It was my pleasure to see Grayson Nam who is a 55 year old male for follow-up of renal cell carcinoma.      HPI  Grayson Nam is a very pleasant 55 year old male who presents with a history of [id number] chromophobe right renal cell carcinoma, s/p laparoscopic nephrectomy 5/1/2019. Overall has done very well since surgery. Cr stable at 1.83     CT chest/abd/pelvis 3/5/2021  IMPRESSION:  1.  No evidence of recurrent or metastatic disease in the chest,  abdomen, or pelvis.  2.  Stable nodule in the right lower lobe appears to be along an  accessory fissure, possibly an intrafissural lymph node.         Allergies:   Lisinopril, Animal dander, Atorvastatin, and Novocain [procaine]         Review of Systems:  From intake questionnaire     Skin: negative  Eyes: negative  Ears/Nose/Throat: negative  Respiratory: No shortness of breath, dyspnea on exertion, cough, or hemoptysis  Cardiovascular: No chest pain or palpitations  Gastrointestinal: negative; no nausea/vomiting, constipation or diarrhea  Genitourinary: as per HPI  Musculoskeletal: negative  Neurologic: negative  Psychiatric: negative  Hematologic/Lymphatic/Immunologic: negative  Endocrine: negative         Physical Exam:     Vitals:  No vitals were obtained today due to virtual visit.    Physical Exam   GENERAL: Healthy, alert and no distress  EYES: Eyes grossly normal to inspection.  No discharge or erythema, or obvious scleral/conjunctival abnormalities.  RESP: No audible wheeze, cough, or visible cyanosis.  No visible retractions or increased work of breathing.    SKIN: Visible skin clear. No  significant rash, abnormal pigmentation or lesions.  NEURO: Cranial nerves grossly intact.  Mentation and speech appropriate for age.  PSYCH: Mentation appears normal, affect normal/bright, judgement and insight intact, normal speech and appearance well-groomed.      Outside and Past Medical records:    Review of relevant notes as documented in Epic    Review of the result(s) of each unique test - pathology, CT, BMP         Assessment and Plan:     55 year old male with pT2a chromophobe RCC, doing well s/p R laparoscopic nephrectomy 5/1/2019. Labs and imaging today unremarkable with no evidence of recurrence. Given he is now two years out from surgery, will move to annual follow-up.      - follow up in 12 months with CT C/A/P, CBC, BMP    Orders  Orders Placed This Encounter   Procedures     CT Chest Abdomen Pelvis w/o Contrast     Basic metabolic panel     CBC with platelets     Video-Visit Details    Type of service:  Video Visit    Video End Time:10:15 AM    Originating Location (pt. Location): Home    Distant Location (provider location):  [facility] AND [place] FOR PROSTATE AND UROLOGIC CANCERS    Platform used for Video Visit: VidSys    Greater than 20 minutes spent on the date of the encounter including direct interaction with the patient, performing chart review, history and exam, documentation and further activities as noted above.    Lino Esparza MD  Urology  [facility]

## 2021-04-18 ENCOUNTER — HEALTH MAINTENANCE LETTER (OUTPATIENT)
Age: 56
End: 2021-04-18

## 2021-08-03 ENCOUNTER — APPOINTMENT (OUTPATIENT)
Dept: URBAN - METROPOLITAN AREA CLINIC 259 | Age: 56
Setting detail: DERMATOLOGY
End: 2021-08-03

## 2021-08-03 DIAGNOSIS — R20.8 OTHER DISTURBANCES OF SKIN SENSATION: ICD-10-CM

## 2021-08-03 DIAGNOSIS — T07XXXA INSECT BITE, NONVENOMOUS, OF OTHER, MULTIPLE, AND UNSPECIFIED SITES, WITHOUT MENTION OF INFECTION: ICD-10-CM

## 2021-08-03 PROBLEM — S80.862A INSECT BITE (NONVENOMOUS), LEFT LOWER LEG, INITIAL ENCOUNTER: Status: ACTIVE | Noted: 2021-08-03

## 2021-08-03 PROBLEM — S90.561A INSECT BITE (NONVENOMOUS), RIGHT ANKLE, INITIAL ENCOUNTER: Status: ACTIVE | Noted: 2021-08-03

## 2021-08-03 PROBLEM — S90.562A INSECT BITE (NONVENOMOUS), LEFT ANKLE, INITIAL ENCOUNTER: Status: ACTIVE | Noted: 2021-08-03

## 2021-08-03 PROBLEM — S80.861A INSECT BITE (NONVENOMOUS), RIGHT LOWER LEG, INITIAL ENCOUNTER: Status: ACTIVE | Noted: 2021-08-03

## 2021-08-03 PROCEDURE — 99213 OFFICE O/P EST LOW 20 MIN: CPT

## 2021-08-03 PROCEDURE — OTHER MIPS QUALITY: OTHER

## 2021-08-03 PROCEDURE — OTHER COUNSELING: OTHER

## 2021-08-03 ASSESSMENT — LOCATION ZONE DERM: LOCATION ZONE: LEG

## 2021-08-03 ASSESSMENT — LOCATION SIMPLE DESCRIPTION DERM
LOCATION SIMPLE: RIGHT ANKLE
LOCATION SIMPLE: LEFT PRETIBIAL REGION
LOCATION SIMPLE: RIGHT PRETIBIAL REGION
LOCATION SIMPLE: LEFT ANKLE

## 2021-08-03 ASSESSMENT — LOCATION DETAILED DESCRIPTION DERM
LOCATION DETAILED: LEFT ANKLE
LOCATION DETAILED: LEFT DISTAL PRETIBIAL REGION
LOCATION DETAILED: RIGHT ANKLE
LOCATION DETAILED: RIGHT DISTAL PRETIBIAL REGION

## 2021-08-03 NOTE — HPI: RASH (INSECT BITE HYPERSENSITIVITY REACTION)
Is This A New Presentation, Or A Follow-Up?: Rash
Additional History: Patient reports bites on his lower legs and feet. He says he was staying at an air b[place]b recently and is wondering if the bites are from bed bugs or something from the sand at the beach. His wife and mother in law were also on vacation with him. His wife has no bites but his mother in law does. He says his mother stayed at the same air b[employer]b but did not go to the beach. He has been using clobetasol on the bites which has helped. He only has one kidney and is wondering if the clobetasol would be harmful for him. \\n\\nOn his lower left leg patient reports a burning sensation and is wondering what the cause may be.

## 2021-08-03 NOTE — PROCEDURE: MIPS QUALITY
Quality 110: Preventive Care And Screening: Influenza Immunization: Influenza Immunization Ordered or Recommended, but not Administered due to system reason
Quality 130: Documentation Of Current Medications In The Medical Record: Current Medications Documented
Quality 431: Preventive Care And Screening: Unhealthy Alcohol Use - Screening: Patient screened for unhealthy alcohol use using a single question and scores less than 2 times per year
Detail Level: Detailed
Quality 226: Preventive Care And Screening: Tobacco Use: Screening And Cessation Intervention: Patient screened for tobacco use and is an ex/non-smoker

## 2021-08-03 NOTE — PROCEDURE: COUNSELING
Detail Level: Zone
Patient Specific Counseling (Will Not Stick From Patient To Patient): Recommended patient follow up with his PCP for the burning sensation of his lower left leg

## 2021-10-02 ENCOUNTER — HEALTH MAINTENANCE LETTER (OUTPATIENT)
Age: 56
End: 2021-10-02

## 2022-02-10 ENCOUNTER — TELEPHONE (OUTPATIENT)
Dept: UROLOGY | Facility: CLINIC | Age: 57
End: 2022-02-10
Payer: COMMERCIAL

## 2022-02-10 NOTE — CONFIDENTIAL NOTE
Called patient to reschedule upcoming appointment. Appt changed to 3/15 at 2 PM. Patient expressed understanding.

## 2022-03-03 ENCOUNTER — PRE VISIT (OUTPATIENT)
Dept: UROLOGY | Facility: CLINIC | Age: 57
End: 2022-03-03
Payer: COMMERCIAL

## 2022-03-03 NOTE — CONFIDENTIAL NOTE
Reason for visit: Follow up w/ labs and imaging     Relevant information: Renal cell carcinoma s/p nephrectomy 5/2019    Records/imaging/labs/orders: CT chest/abd/pelvis, PSA, BMP, CBC on 3/11    Pt called: N/A    At Rooming: Video

## 2022-03-11 ENCOUNTER — LAB (OUTPATIENT)
Dept: LAB | Facility: CLINIC | Age: 57
End: 2022-03-11
Attending: UROLOGY
Payer: COMMERCIAL

## 2022-03-11 ENCOUNTER — HOSPITAL ENCOUNTER (OUTPATIENT)
Dept: CT IMAGING | Facility: CLINIC | Age: 57
Discharge: HOME | End: 2022-03-11
Attending: UROLOGY
Payer: COMMERCIAL

## 2022-03-11 DIAGNOSIS — N18.32 STAGE 3B CHRONIC KIDNEY DISEASE (H): ICD-10-CM

## 2022-03-11 DIAGNOSIS — C64.1 RENAL CELL CARCINOMA, RIGHT (H): ICD-10-CM

## 2022-03-11 LAB
ANION GAP SERPL CALCULATED.3IONS-SCNC: 5 MMOL/L (ref 3–14)
BUN SERPL-MCNC: 23 MG/DL (ref 7–30)
CALCIUM SERPL-MCNC: 9 MG/DL (ref 8.5–10.1)
CHLORIDE BLD-SCNC: 107 MMOL/L (ref 94–109)
CO2 SERPL-SCNC: 27 MMOL/L (ref 20–32)
CREAT SERPL-MCNC: 1.86 MG/DL (ref 0.66–1.25)
ERYTHROCYTE [DISTWIDTH] IN BLOOD BY AUTOMATED COUNT: 12.2 % (ref 10–15)
GFR SERPL CREATININE-BSD FRML MDRD: 42 ML/MIN/1.73M2
GLUCOSE BLD-MCNC: 94 MG/DL (ref 70–99)
HCT VFR BLD AUTO: 45.6 % (ref 40–53)
HGB BLD-MCNC: 15.2 G/DL (ref 13.3–17.7)
MCH RBC QN AUTO: 30.6 PG (ref 26.5–33)
MCHC RBC AUTO-ENTMCNC: 33.3 G/DL (ref 31.5–36.5)
MCV RBC AUTO: 92 FL (ref 78–100)
PLATELET # BLD AUTO: 252 10E3/UL (ref 150–450)
POTASSIUM BLD-SCNC: 4.3 MMOL/L (ref 3.4–5.3)
PSA SERPL-MCNC: 4.45 UG/L (ref 0–4)
RBC # BLD AUTO: 4.97 10E6/UL (ref 4.4–5.9)
SODIUM SERPL-SCNC: 139 MMOL/L (ref 133–144)
WBC # BLD AUTO: 7.3 10E3/UL (ref 4–11)

## 2022-03-11 PROCEDURE — 84153 ASSAY OF PSA TOTAL: CPT

## 2022-03-11 PROCEDURE — 85027 COMPLETE CBC AUTOMATED: CPT

## 2022-03-11 PROCEDURE — 36415 COLL VENOUS BLD VENIPUNCTURE: CPT

## 2022-03-11 PROCEDURE — 80048 BASIC METABOLIC PNL TOTAL CA: CPT

## 2022-03-11 PROCEDURE — 71250 CT THORAX DX C-: CPT

## 2022-03-15 ENCOUNTER — VIRTUAL VISIT (OUTPATIENT)
Dept: UROLOGY | Facility: CLINIC | Age: 57
End: 2022-03-15
Payer: COMMERCIAL

## 2022-03-15 DIAGNOSIS — C64.1 RENAL CELL CARCINOMA, RIGHT (H): Primary | ICD-10-CM

## 2022-03-15 DIAGNOSIS — R97.20 ELEVATED PROSTATE SPECIFIC ANTIGEN (PSA): ICD-10-CM

## 2022-03-15 PROCEDURE — 99213 OFFICE O/P EST LOW 20 MIN: CPT | Mod: 95 | Performed by: UROLOGY

## 2022-03-15 NOTE — PROGRESS NOTES
Grayson Nam is a 56 year old male who is being evaluated via a billable video visit.      How would you like to obtain your AVS? MyTrainerhart  If the video visit is dropped, the invitation should be resent by: Text to cell phone: [phone]  Will anyone else be joining your video visit? No    Video Start Time: 2:15 PM    CHIEF COMPLAINT   It was my pleasure to see Grayson Nam who is a 56 year old male for follow-up of renal cell carcinoma.      HPI  Grayson Nam is a very pleasant 56 year old male who presents with a history of [id number] chromophobe right renal cell carcinoma, s/p laparoscopic nephrectomy 5/1/2019. Overall has done very well since surgery. Cr stable at 1.86.    PSA 4.45  Cr 1.86     CT chest/abd/pelvis 3/11/2022  IMPRESSION:  1.  No evidence of recurrent or metastatic disease in the chest,  abdomen, or pelvis.  2.  Stable right lower lobe nodule.         Allergies:   Lisinopril, Animal dander, Atorvastatin, and Novocain [procaine]         Review of Systems:  From intake questionnaire     Skin: negative  Eyes: negative  Ears/Nose/Throat: negative  Respiratory: No shortness of breath, dyspnea on exertion, cough, or hemoptysis  Cardiovascular: No chest pain or palpitations  Gastrointestinal: negative; no nausea/vomiting, constipation or diarrhea  Genitourinary: as per HPI  Musculoskeletal: negative  Neurologic: negative  Psychiatric: negative  Hematologic/Lymphatic/Immunologic: negative  Endocrine: negative         Physical Exam:     Vitals:  No vitals were obtained today due to virtual visit.    Physical Exam   GENERAL: Healthy, alert and no distress  EYES: Eyes grossly normal to inspection.  No discharge or erythema, or obvious scleral/conjunctival abnormalities.  RESP: No audible wheeze, cough, or visible cyanosis.  No visible retractions or increased work of breathing.    SKIN: Visible skin clear. No significant rash, abnormal pigmentation or lesions.  NEURO: Cranial nerves  grossly intact.  Mentation and speech appropriate for age.  PSYCH: Mentation appears normal, affect normal/bright, judgement and insight intact, normal speech and appearance well-groomed.      Outside and Past Medical records:    Review of the result(s) of each unique test - CT, BMP, PSA         Assessment and Plan:     56 year old male with pT2a chromophobe RCC, doing well s/p R laparoscopic nephrectomy 5/1/2019. Labs and imaging today unremarkable with no evidence of recurrence. Plan next surveillance scans in 1 year.    PSA elevated to 4.45 on most recent check. We had a long discussion about the utility of PSA screening.  We talked about the Pros and Cons.  We discussed the findings of the PLCO and EORTC studies.  We discussed the results of the [place] trial of treatment vs. observation in clinically detected prostate cancer as well as the results of the PIVOT trial in the context of screen-detected cancer.  We discussed the recommendations by the AUA, and USPSTF. We also discussed the significant (2-6%) and rising risk of biopsy associated sepsis.    We also discussed the emerging role of MRI in the diagnosis of prostate cancer and the potential for performing MRI-Ultrasound Fusion biopsy if suspicious lesions are noted. At this point, will plan to recheck his PSA in about 1 month and consider MRI if persistently elevated.     - PSA in 1 month  - follow up in 12 months with CT C/A/P, CBC, BMP    Orders  Orders Placed This Encounter   Procedures     CT Chest Abdomen Pelvis w/o Contrast     PSA tumor marker     Basic metabolic panel     Video-Visit Details    Type of service:  Video Visit    Video End Time:2:32 PM    Originating Location (pt. Location): Home    Distant Location (provider location):   [facility] AND [facility] FOR PROSTATE AND UROLOGIC CANCERS    Platform used for Video Visit: [employer]    19 minutes spent on the date of the encounter including direct interaction with the patient, performing  chart review, history and exam, documentation and further activities as noted above.    Lino Esparza MD  Urology  [facility]

## 2022-04-27 ENCOUNTER — LAB (OUTPATIENT)
Dept: LAB | Facility: CLINIC | Age: 57
End: 2022-04-27
Payer: COMMERCIAL

## 2022-04-27 DIAGNOSIS — R97.20 ELEVATED PROSTATE SPECIFIC ANTIGEN (PSA): ICD-10-CM

## 2022-04-27 PROCEDURE — 36415 COLL VENOUS BLD VENIPUNCTURE: CPT

## 2022-04-27 PROCEDURE — 84153 ASSAY OF PSA TOTAL: CPT

## 2022-04-28 LAB — PSA SERPL-MCNC: 4.21 UG/L (ref 0–4)

## 2022-05-02 DIAGNOSIS — R97.20 ELEVATED PROSTATE SPECIFIC ANTIGEN (PSA): Primary | ICD-10-CM

## 2022-05-04 ENCOUNTER — TELEPHONE (OUTPATIENT)
Dept: UROLOGY | Facility: CLINIC | Age: 57
End: 2022-05-04
Payer: COMMERCIAL

## 2022-05-04 NOTE — CONFIDENTIAL NOTE
Lino Esparza MD Park, Robyn, JAYSON  Contrast is important to help evaluate the prostate. The type of contrast used is safe at his current level of renal function. He can certainly ask the radiologists about this further when he comes for his scan.     Relayed the message to the patient. Patient expressed understanding.

## 2022-05-04 NOTE — CONFIDENTIAL NOTE
Called pt to explain that his PSA results are persistently elevated and that Dr. Esparza recommends that he set up an appt to go over MRI prostate. Pt agreed. Transferred pt to imaging scheduling team to set up MRI appt.     Called again to schedule virtual appointment with Dr. Esparza on 6/14.    Patient was wondering if the contrast would have any effect on his kidneys and if he can have his MRI done without contrast. Sent in-basket message to Dr. Esparza.

## 2022-05-14 ENCOUNTER — HEALTH MAINTENANCE LETTER (OUTPATIENT)
Age: 57
End: 2022-05-14

## 2022-05-27 ENCOUNTER — ANCILLARY PROCEDURE (OUTPATIENT)
Dept: MRI IMAGING | Facility: CLINIC | Age: 57
End: 2022-05-27
Attending: UROLOGY
Payer: COMMERCIAL

## 2022-05-27 DIAGNOSIS — R97.20 ELEVATED PROSTATE SPECIFIC ANTIGEN (PSA): ICD-10-CM

## 2022-05-27 PROCEDURE — 72197 MRI PELVIS W/O & W/DYE: CPT | Performed by: RADIOLOGY

## 2022-05-27 PROCEDURE — A9585 GADOBUTROL INJECTION: HCPCS | Performed by: RADIOLOGY

## 2022-05-27 RX ORDER — GADOBUTROL 604.72 MG/ML
10 INJECTION INTRAVENOUS ONCE
Status: COMPLETED | OUTPATIENT
Start: 2022-05-27 | End: 2022-05-27

## 2022-05-27 RX ADMIN — GADOBUTROL 9 ML: 604.72 INJECTION INTRAVENOUS at 17:11

## 2022-06-01 ENCOUNTER — PRE VISIT (OUTPATIENT)
Dept: UROLOGY | Facility: CLINIC | Age: 57
End: 2022-06-01
Payer: COMMERCIAL

## 2022-06-01 NOTE — CONFIDENTIAL NOTE
Reason for visit: Follow up w/ MRI prostate     Relevant information: Elevated PSA; renal cell carcinoma s/p right nephrectomy 5/2019    Records/imaging/labs/orders: MRI prostate on 5/27    Pt called: N/A    At Rooming: Video

## 2022-06-14 ENCOUNTER — VIRTUAL VISIT (OUTPATIENT)
Dept: UROLOGY | Facility: CLINIC | Age: 57
End: 2022-06-14
Payer: COMMERCIAL

## 2022-06-14 ENCOUNTER — LAB (OUTPATIENT)
Dept: LAB | Facility: CLINIC | Age: 57
End: 2022-06-14

## 2022-06-14 DIAGNOSIS — C64.1 RENAL CELL CARCINOMA, RIGHT (H): ICD-10-CM

## 2022-06-14 DIAGNOSIS — R97.20 ELEVATED PROSTATE SPECIFIC ANTIGEN (PSA): Primary | ICD-10-CM

## 2022-06-14 PROCEDURE — 99213 OFFICE O/P EST LOW 20 MIN: CPT | Mod: 95 | Performed by: UROLOGY

## 2022-06-14 PROCEDURE — 36415 COLL VENOUS BLD VENIPUNCTURE: CPT

## 2022-06-14 PROCEDURE — 80053 COMPREHEN METABOLIC PANEL: CPT

## 2022-06-14 NOTE — PROGRESS NOTES
Grayson Nam is a 57 year old male who is being evaluated via a billable video visit.      How would you like to obtain your AVS? MyChart  If the video visit is dropped, the invitation should be resent by: Text to cell phone: [phone]  Will anyone else be joining your video visit? No    Video Start Time: 12:50 PM    CHIEF COMPLAINT   It was my pleasure to see Grayson Nam who is a 57 year old male for follow-up of elevated PSA and renal cell carcinoma.      HPI  Grayson Nam is a very pleasant 57 year old male who presents with a history of [id number] chromophobe right renal cell carcinoma, s/p laparoscopic nephrectomy 5/1/2019. Overall has done very well since surgery. Cr stable at 1.86.    PSA ruthann to 4.45 and 4.21 on recheck. PIRADS 2 on MRI.     PSA   3/11/2022 - 4.45  4/27/2022 - 4.21    MRI Prostate 5/27/2022  Size: 52 grams  IMPRESSION:  1. Based on the most suspicious abnormality, this exam is  characterized as PIRADS 2 - Clinically significant cancer is unlikely  to be present.  2. No suspicious adenopathy or evidence of pelvic metastases.     CT chest/abd/pelvis 3/11/2022  IMPRESSION:  1.  No evidence of recurrent or metastatic disease in the chest,  abdomen, or pelvis.  2.  Stable right lower lobe nodule.         Allergies:   Lisinopril, Animal dander, Atorvastatin, and Novocain [procaine]         Review of Systems:  From intake questionnaire     Skin: negative  Eyes: negative  Ears/Nose/Throat: negative  Respiratory: No shortness of breath, dyspnea on exertion, cough, or hemoptysis  Cardiovascular: No chest pain or palpitations  Gastrointestinal: negative; no nausea/vomiting, constipation or diarrhea  Genitourinary: as per HPI  Musculoskeletal: negative  Neurologic: negative  Psychiatric: negative  Hematologic/Lymphatic/Immunologic: negative  Endocrine: negative         Physical Exam:     Vitals:  No vitals were obtained today due to virtual visit.    Physical Exam   GENERAL:  Healthy, alert and no distress  EYES: Eyes grossly normal to inspection.  No discharge or erythema, or obvious scleral/conjunctival abnormalities.  RESP: No audible wheeze, cough, or visible cyanosis.  No visible retractions or increased work of breathing.    SKIN: Visible skin clear. No significant rash, abnormal pigmentation or lesions.  NEURO: Cranial nerves grossly intact.  Mentation and speech appropriate for age.  PSYCH: Mentation appears normal, affect normal/bright, judgement and insight intact, normal speech and appearance well-groomed.      Outside and Past Medical records:    Review of the result(s) of each unique test - PSA, MRI, CT         Assessment and Plan:     57 year old male with pT2a chromophobe RCC, doing well s/p R laparoscopic nephrectomy 5/1/2019. Labs and imaging today unremarkable with no evidence of recurrence. Plan next surveillance scans in 1 year.     PSA elevated to 4.45 on most recent check, 4.21 on recheck, and PIRADS 2 on MRI. We discussed these findings and that while reassuring that MRI did not show suspicious lesions, this does not rule out prostate cancer. As such, we discussed the role for standard TRUS biopsy. Risks were discussed, including risk of infection. We also reviewed the rather low risk of clinically significant cancer given low PSA and MRI findings, and observation with another PSA recheck would be reasonable. He would prefer to observe for now and follow-up with PSA in 3 months.     - Follow-up 3 months with PSA free and total  - follow up in March 2023 with CT for RCC    Orders  Orders Placed This Encounter   Procedures     PSA total and free     Video-Visit Details    Type of service:  Video Visit    Video End Time:1:00 PM    Originating Location (pt. Location): Home    Distant Location (provider location):   [facility] AND [facility] FOR PROSTATE AND UROLOGIC CANCERS    Platform used for Video Visit: [employer]    13 minutes spent on the date of the encounter  including direct interaction with the patient, performing chart review, history and exam, documentation and further activities as noted above.    Lino Esparza MD  Urology  [facility]

## 2022-06-15 LAB
ALBUMIN SERPL-MCNC: 4.1 G/DL (ref 3.4–5)
ALP SERPL-CCNC: 96 U/L (ref 40–150)
ALT SERPL W P-5'-P-CCNC: 26 U/L (ref 0–70)
ANION GAP SERPL CALCULATED.3IONS-SCNC: 5 MMOL/L (ref 3–14)
AST SERPL W P-5'-P-CCNC: 25 U/L (ref 0–45)
BILIRUB SERPL-MCNC: 0.6 MG/DL (ref 0.2–1.3)
BUN SERPL-MCNC: 18 MG/DL (ref 7–30)
CALCIUM SERPL-MCNC: 8.7 MG/DL (ref 8.5–10.1)
CHLORIDE BLD-SCNC: 108 MMOL/L (ref 94–109)
CO2 SERPL-SCNC: 27 MMOL/L (ref 20–32)
CREAT SERPL-MCNC: 1.78 MG/DL (ref 0.66–1.25)
GFR SERPL CREATININE-BSD FRML MDRD: 44 ML/MIN/1.73M2
GLUCOSE BLD-MCNC: 93 MG/DL (ref 70–99)
POTASSIUM BLD-SCNC: 5.1 MMOL/L (ref 3.4–5.3)
PROT SERPL-MCNC: 7.3 G/DL (ref 6.8–8.8)
SODIUM SERPL-SCNC: 140 MMOL/L (ref 133–144)

## 2022-08-31 ENCOUNTER — PRE VISIT (OUTPATIENT)
Dept: UROLOGY | Facility: CLINIC | Age: 57
End: 2022-08-31

## 2022-08-31 NOTE — TELEPHONE ENCOUNTER
Reason for visit: PSA recheck    Relevant information: Elevated PSA, renal cell carcinoma s/p right nephrectomy 5/2019    Records/imaging/labs/orders: PSA free and total on 9/8    Pt called: N/A    At Rooming: Video

## 2022-09-02 ENCOUNTER — MYC MEDICAL ADVICE (OUTPATIENT)
Dept: UROLOGY | Facility: CLINIC | Age: 57
End: 2022-09-02

## 2022-09-03 ENCOUNTER — HEALTH MAINTENANCE LETTER (OUTPATIENT)
Age: 57
End: 2022-09-03

## 2022-09-08 ENCOUNTER — LAB (OUTPATIENT)
Dept: LAB | Facility: CLINIC | Age: 57
End: 2022-09-08
Payer: COMMERCIAL

## 2022-09-08 DIAGNOSIS — R97.20 ELEVATED PROSTATE SPECIFIC ANTIGEN (PSA): ICD-10-CM

## 2022-09-08 LAB
PSA FREE MFR SERPL: 21.41 %
PSA FREE SERPL-MCNC: 0.7 NG/ML
PSA SERPL-MCNC: 3.27 NG/ML (ref 0–3.5)

## 2022-09-08 PROCEDURE — 84153 ASSAY OF PSA TOTAL: CPT

## 2022-09-08 PROCEDURE — 84154 ASSAY OF PSA FREE: CPT

## 2022-09-08 PROCEDURE — 36415 COLL VENOUS BLD VENIPUNCTURE: CPT

## 2022-09-13 ENCOUNTER — VIRTUAL VISIT (OUTPATIENT)
Dept: UROLOGY | Facility: CLINIC | Age: 57
End: 2022-09-13
Payer: COMMERCIAL

## 2022-09-13 DIAGNOSIS — R97.20 ELEVATED PROSTATE SPECIFIC ANTIGEN (PSA): Primary | ICD-10-CM

## 2022-09-13 PROCEDURE — 99213 OFFICE O/P EST LOW 20 MIN: CPT | Mod: 95 | Performed by: UROLOGY

## 2022-09-13 NOTE — PROGRESS NOTES
Grayson Nam is a 57 year old male who is being evaluated via a billable video visit.      How would you like to obtain your AVS? MyChart  If the video visit is dropped, the invitation should be resent by: Text to cell phone: [phone]  Will anyone else be joining your video visit? No    Video Start Time: 2:22 PM    CHIEF COMPLAINT   It was my pleasure to see Grayson Nam who is a 57 year old male for follow-up of renal cell carcinoma and elevated PSA.      HPI  Grayson Nam is a very pleasant 57 year old male who presents with a history of [id number] chromophobe right renal cell carcinoma, s/p laparoscopic nephrectomy 5/1/2019. Overall has done very well since surgery. Cr stable at 1.86.     PSA ruthann to 4.45 and 4.21 on recheck. PIRADS 2 on MRI.  Now with additional recheck PSA down to 3.27. No new symptoms.     PSA   9/8/2022 - 3.27 (21% free PSA)  4/27/2022 - 4.21  3/11/2022 - 4.45    MRI Prostate 5/27/2022  Size: 52 grams  IMPRESSION:  1. Based on the most suspicious abnormality, this exam is  characterized as PIRADS 2 - Clinically significant cancer is unlikely  to be present.  2. No suspicious adenopathy or evidence of pelvic metastases.     CT chest/abd/pelvis 3/11/2022  IMPRESSION:  1.  No evidence of recurrent or metastatic disease in the chest,  abdomen, or pelvis.  2.  Stable right lower lobe nodule.         Allergies:   Lisinopril, Animal dander, Atorvastatin, and Novocain [procaine]         Review of Systems:  From intake questionnaire     Skin: negative  Eyes: negative  Ears/Nose/Throat: negative  Respiratory: No shortness of breath, dyspnea on exertion, cough, or hemoptysis  Cardiovascular: No chest pain or palpitations  Gastrointestinal: negative; no nausea/vomiting, constipation or diarrhea  Genitourinary: as per HPI  Musculoskeletal: negative  Neurologic: negative  Psychiatric: negative  Hematologic/Lymphatic/Immunologic: negative  Endocrine: negative         Physical Exam:      Vitals:  No vitals were obtained today due to virtual visit.    Physical Exam   GENERAL: Healthy, alert and no distress  EYES: Eyes grossly normal to inspection.  No discharge or erythema, or obvious scleral/conjunctival abnormalities.  RESP: No audible wheeze, cough, or visible cyanosis.  No visible retractions or increased work of breathing.    SKIN: Visible skin clear. No significant rash, abnormal pigmentation or lesions.  NEURO: Cranial nerves grossly intact.  Mentation and speech appropriate for age.  PSYCH: Mentation appears normal, affect normal/bright, judgement and insight intact, normal speech and appearance well-groomed.      Outside and Past Medical records:    Review of the result(s) of each unique test - PSA         Assessment and Plan:     57 year old male with pT2a chromophobe RCC, doing well s/p R laparoscopic nephrectomy 5/1/2019. Labs and imaging today unremarkable with no evidence of recurrence. Plan next surveillance scans in 1 year.    Regarding PSA, this has returned to normal level of 3.27 and had PIRADS 2 on MRI. As such, will continue monitoring. Will recheck in 6 months when he returns for RCC surveillance.     - Follow up in 6 months with CT for RCC, BMP, and PSA    Orders  Orders Placed This Encounter   Procedures     PSA tumor marker     Video-Visit Details    Type of service:  Video Visit    Video End Time:2:30 PM    Originating Location (pt. Location): Home    Distant Location (provider location):  [facility] UROLOGY AND [facility] PROSTATE AND UROLOGIC [facility]CERS    Platform used for Video Visit: [employer]    10 minutes spent on the date of the encounter including direct interaction with the patient, performing chart review, history and exam, documentation and further activities as noted above.    Lino Esparza MD  Urology  [facility]

## 2022-09-29 ENCOUNTER — APPOINTMENT (OUTPATIENT)
Dept: URBAN - METROPOLITAN AREA CLINIC 259 | Age: 57
Setting detail: DERMATOLOGY
End: 2022-09-29

## 2022-09-29 DIAGNOSIS — L82.1 OTHER SEBORRHEIC KERATOSIS: ICD-10-CM

## 2022-09-29 DIAGNOSIS — Z71.89 OTHER SPECIFIED COUNSELING: ICD-10-CM

## 2022-09-29 DIAGNOSIS — L57.0 ACTINIC KERATOSIS: ICD-10-CM

## 2022-09-29 DIAGNOSIS — D18.0 HEMANGIOMA: ICD-10-CM

## 2022-09-29 DIAGNOSIS — L57.8 OTHER SKIN CHANGES DUE TO CHRONIC EXPOSURE TO NONIONIZING RADIATION: ICD-10-CM

## 2022-09-29 DIAGNOSIS — L81.4 OTHER MELANIN HYPERPIGMENTATION: ICD-10-CM

## 2022-09-29 DIAGNOSIS — D22 MELANOCYTIC NEVI: ICD-10-CM

## 2022-09-29 PROBLEM — D18.01 HEMANGIOMA OF SKIN AND SUBCUTANEOUS TISSUE: Status: ACTIVE | Noted: 2022-09-29

## 2022-09-29 PROBLEM — D22.5 MELANOCYTIC NEVI OF TRUNK: Status: ACTIVE | Noted: 2022-09-29

## 2022-09-29 PROCEDURE — 17000 DESTRUCT PREMALG LESION: CPT

## 2022-09-29 PROCEDURE — 99213 OFFICE O/P EST LOW 20 MIN: CPT | Mod: 25

## 2022-09-29 PROCEDURE — OTHER LIQUID NITROGEN: OTHER

## 2022-09-29 PROCEDURE — OTHER COUNSELING: OTHER

## 2022-09-29 PROCEDURE — OTHER MIPS QUALITY: OTHER

## 2022-09-29 ASSESSMENT — LOCATION ZONE DERM
LOCATION ZONE: TRUNK
LOCATION ZONE: SCALP

## 2022-09-29 ASSESSMENT — LOCATION DETAILED DESCRIPTION DERM
LOCATION DETAILED: LEFT MEDIAL UPPER BACK
LOCATION DETAILED: LEFT INFERIOR MEDIAL UPPER BACK
LOCATION DETAILED: INFERIOR THORACIC SPINE
LOCATION DETAILED: RIGHT SUPERIOR MEDIAL UPPER BACK
LOCATION DETAILED: RIGHT MEDIAL FRONTAL SCALP

## 2022-09-29 ASSESSMENT — LOCATION SIMPLE DESCRIPTION DERM
LOCATION SIMPLE: LEFT UPPER BACK
LOCATION SIMPLE: RIGHT SCALP
LOCATION SIMPLE: UPPER BACK
LOCATION SIMPLE: RIGHT UPPER BACK

## 2023-03-30 DIAGNOSIS — R97.20 ELEVATED PROSTATE SPECIFIC ANTIGEN (PSA): Primary | ICD-10-CM

## 2023-04-06 ENCOUNTER — LAB (OUTPATIENT)
Dept: LAB | Facility: CLINIC | Age: 58
End: 2023-04-06
Attending: UROLOGY
Payer: COMMERCIAL

## 2023-04-06 ENCOUNTER — HOSPITAL ENCOUNTER (OUTPATIENT)
Dept: CT IMAGING | Facility: CLINIC | Age: 58
Discharge: HOME | End: 2023-04-06
Attending: UROLOGY
Payer: COMMERCIAL

## 2023-04-06 DIAGNOSIS — C64.1 RENAL CELL CARCINOMA, RIGHT (H): ICD-10-CM

## 2023-04-06 DIAGNOSIS — R97.20 ELEVATED PROSTATE SPECIFIC ANTIGEN (PSA): ICD-10-CM

## 2023-04-06 LAB
ANION GAP SERPL CALCULATED.3IONS-SCNC: 9 MMOL/L (ref 7–15)
BUN SERPL-MCNC: 16.2 MG/DL (ref 8–23)
CALCIUM SERPL-MCNC: 9.6 MG/DL (ref 8.6–10)
CHLORIDE SERPL-SCNC: 106 MMOL/L (ref 98–107)
CREAT SERPL-MCNC: 1.77 MG/DL (ref 0.67–1.17)
DEPRECATED HCO3 PLAS-SCNC: 26 MMOL/L (ref 22–29)
GFR SERPL CREATININE-BSD FRML MDRD: 44 ML/MIN/1.73M2
GLUCOSE SERPL-MCNC: 117 MG/DL (ref 70–99)
POTASSIUM SERPL-SCNC: 5.1 MMOL/L (ref 3.4–5.3)
PSA SERPL DL<=0.01 NG/ML-MCNC: 3.67 NG/ML (ref 0–3.5)
SODIUM SERPL-SCNC: 141 MMOL/L (ref 136–145)

## 2023-04-06 PROCEDURE — 80048 BASIC METABOLIC PNL TOTAL CA: CPT

## 2023-04-06 PROCEDURE — 84153 ASSAY OF PSA TOTAL: CPT

## 2023-04-06 PROCEDURE — 71250 CT THORAX DX C-: CPT

## 2023-04-06 PROCEDURE — 36415 COLL VENOUS BLD VENIPUNCTURE: CPT

## 2023-04-13 ENCOUNTER — VIRTUAL VISIT (OUTPATIENT)
Dept: UROLOGY | Facility: CLINIC | Age: 58
End: 2023-04-13
Payer: COMMERCIAL

## 2023-04-13 ENCOUNTER — OFFICE VISIT (OUTPATIENT)
Dept: NEUROLOGY | Facility: CLINIC | Age: 58
End: 2023-04-13
Payer: COMMERCIAL

## 2023-04-13 VITALS
SYSTOLIC BLOOD PRESSURE: 142 MMHG | BODY MASS INDEX: 34.81 KG/M2 | TEMPERATURE: 98.1 F | WEIGHT: 221.8 LBS | HEART RATE: 88 BPM | HEIGHT: 67 IN | DIASTOLIC BLOOD PRESSURE: 90 MMHG

## 2023-04-13 DIAGNOSIS — R97.20 ELEVATED PROSTATE SPECIFIC ANTIGEN (PSA): ICD-10-CM

## 2023-04-13 DIAGNOSIS — C64.1 RENAL CELL CARCINOMA, RIGHT (H): Primary | ICD-10-CM

## 2023-04-13 DIAGNOSIS — R40.4 NONSPECIFIC PAROXYSMAL SPELL: Primary | ICD-10-CM

## 2023-04-13 PROCEDURE — 99213 OFFICE O/P EST LOW 20 MIN: CPT | Mod: VID | Performed by: UROLOGY

## 2023-04-13 RX ORDER — ASPIRIN 81 MG/1
TABLET, COATED ORAL
COMMUNITY
Start: 2023-03-26

## 2023-04-13 RX ORDER — BUPROPION HYDROCHLORIDE 150 MG/1
150 TABLET, EXTENDED RELEASE ORAL
COMMUNITY
Start: 2023-03-29

## 2023-04-13 RX ORDER — ROSUVASTATIN CALCIUM 10 MG/1
10 TABLET, COATED ORAL DAILY
COMMUNITY
Start: 2022-12-01

## 2023-04-13 RX ORDER — AMLODIPINE BESYLATE 5 MG/1
TABLET ORAL
COMMUNITY
Start: 2023-03-05

## 2023-04-13 ASSESSMENT — PAIN SCALES - GENERAL: PAINLEVEL: SEVERE PAIN (7)

## 2023-04-13 NOTE — PROGRESS NOTES
"[facility] [email or web address]]/[facility] Epilepsy Care History and Physical       Patient:  Grayson Nam  :  1965   Age:  58 year old   Today's Office Visit:  2023    Referring Provider:    Referred Self, MD  No address on file    History of Present Illness:  Grayson Nam is 58 year old right handed who presents with one prolonged spell. Accompanied with his wife Sindhu.   Had \"dream like state in which I was doing daily activities, but, I was not actually doing them. I was partially confused, this lasted 2-3 hours\". Denies abnormal taste, or smell, or aurora vu, or panic feeling, or weakness, or sensory loss. He had minor discomfort, EKG was fine yesterday, no shortness of breath. Per Sindhu \"he was out of it, he was having recurrent thoughts, tired, stress with grandkid age 5 and 3, he was able to talk, he was able to answer questions. He did not know the date, he did not know president\". Since yesterday he has nausea, headache. He had right arm pain started 3 weeks ago. His wife has not noticed convulsion, he has bruises on leg or hand which may have happened overnight. He has sleep apnea and uses CPAP, he had it evaluated CPAP in 2023, sleeps 7 hours, he has chronic renal issues with no major changes. He started herbal tea 1 week ago (chammomile, ruthann hip, charley, lemon peel, orange peel). He states he does not get headaches, pain is in the middle of head, dull persistent pain, pain 7/10, no sensitivity to light or noise, he has nausea, he has not taken any pain medications. Sindhu states he does get headaches once a month.     Epilepsy Risk Factors:  Patient has no history of encephalitis/meningitis, no history of stroke, no history of tumor, no history of traumatic brain injury.  The patient had a normal birth and delivery.  No developmental delays noted.  No febrile seizures.  No family members with epilepsy.     Precipitating factors:   stress  Current Outpatient Medications   Medication Sig Dispense Refill     " acetaminophen (TYLENOL) 325 MG tablet Take 325-650 mg by mouth every 6 hours as needed for mild pain       amLODIPine (NORVASC) 5 MG tablet        ASPIRIN LOW DOSE 81 MG EC tablet        buPROPion (WELLBUTRIN SR) 150 MG 12 hr tablet Take 150 mg by mouth       Multiple Vitamins-Minerals (OCUVITE PRESERVISION PO)        rosuvastatin (CRESTOR) 10 MG tablet Take 10 mg by mouth daily       vitamin D2 (ERGOCALCIFEROL) 14173 units (1250 mcg) capsule Take 50,000 Units by mouth once a week       albuterol (PROAIR HFA/PROVENTIL HFA/VENTOLIN HFA) 108 (90 Base) MCG/ACT inhaler Inhale 1-2 puffs into the lungs every 6 hours as needed for shortness of breath / dyspnea or wheezing       amLODIPine (NORVASC) 2.5 MG tablet Take 2.5 mg by mouth At Bedtime   1     buPROPion (WELLBUTRIN) 75 MG tablet Take 150 mg by mouth 2 times daily        fluticasone-salmeterol (ADVAIR) 100-50 MCG/DOSE diskus inhaler Inhale 1 puff into the lungs 2 times daily as needed        pravastatin (PRAVACHOL) 20 MG tablet Take 20 mg by mouth              View : No data to display.              Past Medical History:   Diagnosis Date     Adjustment disorder with anxiety      Cough variant asthma      Depressive disorder      Diverticulosis      Essential hypertension, benign     Hypertension, Benign     High cholesterol      History of blood transfusion      Kidney disease     CKD stage 3     Mixed hyperlipidemia      Proteinuria       Past Surgical History:   Procedure Laterality Date     APPENDECTOMY OPEN  1971     COLONOSCOPY       LAPAROSCOPIC HERNIORRHAPHY INGUINAL Left 2/15/2018    Procedure: LAPAROSCOPIC HERNIORRHAPHY INGUINAL;  LAPAROSCOPIC LEFT INGUINAL HERNIA REPAIR WITH MESH;  Surgeon: Tahir Sena MD;  Location:  [facility]     LAPAROSCOPIC NEPHRECTOMY Right 5/1/2019    Procedure: RIGHT LAPAROSCOPIC RADICAL NEPHRECTOMY;  Surgeon: Lino Esparza MD;  Location:  OR     Family History   Problem Relation Age of Onset     Hypertension Father  "     Cerebrovascular Disease Father      Diabetes Maternal Grandmother      Cerebrovascular Disease Maternal Grandfather      Unknown/Adopted Paternal Grandmother      Unknown/Adopted Paternal Grandfather       Psycho-Social History: Grayson Nam currently lives with wife, [employer], works for city [employer].  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 He has one hour commute for work one way.   Currently, patient denies feeling depressed, denies feeling anhedonia, denies suicidal  thoughts, and denies having feelings of excessive guilt/worthlessness.  Does not smoke, rare alcohol use, no recreational drug use. We reviewed importance of mental and emotional wellbeing and impact on health.   Driving:  Currently patient is:  Not driving  Previous Evaluations for Epilepsy:   EEG: none  MRI of Brain: none  Exam:    BP (!) 142/90 (BP Location: Right arm, Patient Position: Sitting, Cuff Size: Adult Regular)   Pulse 88   Temp 98.1  F (36.7  C) (Temporal)   Ht 5' 7\" (170.2 cm)   Wt 221 lb 12.8 oz (100.6 kg)   BMI 34.74 kg/m       Wt Readings from Last 5 Encounters:   04/13/23 221 lb 12.8 oz (100.6 kg)   02/26/20 195 lb (88.5 kg)   12/06/19 199 lb 3.3 oz (90.4 kg)   08/28/19 209 lb 12.8 oz (95.2 kg)   05/22/19 219 lb 3.2 oz (99.4 kg)       Alert, orientated, speech is fluent, pupils are equal, round, face symmetric, no pronator drip, equal  strength, normal to light touch with no sensory deficits noted, finger to nose normal, no focal deficits noted. Gait is stable. Wide based gait.       Impression:    Transient confusional and dreamlike state may be migraine vs seizure vs vascular cause     Discussion:   Grayson Nam is 58 year old right handed who presents with one prolonged spell. Transient confusional and dreamlike state may be migraine vs seizure vs vascular cause. At this time we will complete more test and evaluate. No antiepileptic drug at this time. I " am not sure if this spell is seizure.     Plan :      No seizure medications   No driving   EEG  MRI brain (no contrast)   MRA neck and head (no contrast)   Letter for work   Start riboflavin 400 mg per day (check with kidney doctor if this dose if safe) and take tylenol   Increase self care - stress management   Follow up with Dr. Junior after MRI with next available   Consider talk therapist - consult     The patient was advised to maintain proper seizure precautions. [place] regulations on driving were reviewed with the patient. The patient clearly understands that she/he is prohibited from operating a motor vehicle within 3 months following any seizure or other episode with sudden unconsciousness or inability to sit up, and that it is required to report most recent seizure to the [facility] within 30 days after the event.    Patient was advised to avoid any activities that might lead to self-injury or injury of others, within 3 months following any seizure with impaired awareness or impaired motor control such activities include but are not limited to operating power tools, operating firearms, climbing ladders/trees/exposure to heights from which he might fall. Patient should not operate power tools or heavy machinery and equipment.  Patient was advised not to swim alone.  Patient should not bathe in any form of tub, such as bathtub, jacuzzi, or hot tub unless there is a responsible adult close by to provide assistance in case she has a seizure and drowns. Patient should not work on hot surfaces such stoves, ovens, or with scalding hot water.         I spent 71 minutes in total today to provide comprehensive  medical care.   I spent 5 minutes writing the note and placing orders.   I spent 4 minutes  reviewing the chart.     The rest of the time was spent with the patient in face to face interview. During this time key medical decisions were made with review of medical chart prior to visit, visit with patient,  counseling/education, and post visit work, including documentation of note on the day of visit. I addressed all questions the patient/caregiver raised in regards to epilepsy or related medical questions.       Cora Junior MD   Epilepsy Staff

## 2023-04-13 NOTE — PATIENT INSTRUCTIONS
Transient confusional and dreamlike state may be migraine vs seizure vs vascular cause       Plan :      No seizure medications at this time  No driving 3 months until spell free   EEG  MRI brain (no contrast)   MRA neck and head (no contrast)   Letter for work   Start riboflavin 400 mg per day (check with kidney doctor if this dose if safe) and take tylenol   Increase self care - stress management and sleep   Follow up with Dr. Junior after MRI with next available   Consider talk therapist - consult       Cora Junior MD

## 2023-04-13 NOTE — PROGRESS NOTES
Grayson Nam is a 58 year old male who is being evaluated via a billable video visit.      How would you like to obtain your AVS? MyChart  If the video visit is dropped, the invitation should be resent by: Text to cell phone: [phone]  Will anyone else be joining your video visit? No    Video Start Time: 11:50 AM    CHIEF COMPLAINT   It was my pleasure to see Grayson Nam who is a 58 year old male for follow-up of Elevated PSA and renal cell carcinoma.      HPI  Grayson Nam is a very pleasant 58 year old male who presents with a history of [id number] chromophobe right renal cell carcinoma, s/p laparoscopic nephrectomy 5/1/2019. Overall has done very well since surgery. Cr stable at 1.77.     PSA ruthann to 4.45 and 4.21 on recheck. PIRADS 2 on MRI 5/27/2022. Has returned to normal levels on recheck.     CT chest/abd/pelvis 4/6/2023  IMPRESSION:  No recurrent or residual malignancy demonstrated.     PSA   4/6/2023 - 3.67  9/8/2022 - 3.27 (21% free PSA)  4/27/2022 - 4.21  3/11/2022 - 4.45     MRI Prostate 5/27/2022  Size: 52 grams  IMPRESSION:  1. Based on the most suspicious abnormality, this exam is  characterized as PIRADS 2 - Clinically significant cancer is unlikely  to be present.  2. No suspicious adenopathy or evidence of pelvic metastases.         Allergies:   Lisinopril, Animal dander, Atorvastatin, and Novocain [procaine]         Review of Systems:  From intake questionnaire     Skin: negative  Eyes: negative  Ears/Nose/Throat: negative  Respiratory: No shortness of breath, dyspnea on exertion, cough, or hemoptysis  Cardiovascular: No chest pain or palpitations  Gastrointestinal: negative; no nausea/vomiting, constipation or diarrhea  Genitourinary: as per HPI  Musculoskeletal: negative  Neurologic: negative  Psychiatric: negative  Hematologic/Lymphatic/Immunologic: negative  Endocrine: negative         Physical Exam:     Vitals:  No vitals were obtained today due to virtual  visit.    Physical Exam   GENERAL: Healthy, alert and no distress  EYES: Eyes grossly normal to inspection.  No discharge or erythema, or obvious scleral/conjunctival abnormalities.  RESP: No audible wheeze, cough, or visible cyanosis.  No visible retractions or increased work of breathing.    SKIN: Visible skin clear. No significant rash, abnormal pigmentation or lesions.  NEURO: Cranial nerves grossly intact.  Mentation and speech appropriate for age.  PSYCH: Mentation appears normal, affect normal/bright, judgement and insight intact, normal speech and appearance well-groomed.      Outside and Past Medical records:    Review of the result(s) of each unique test - CT, BMP, PSA         Assessment and Plan:   58 year old male with pT2a chromophobe RCC, doing well s/p R laparoscopic nephrectomy 5/1/2019. Labs and imaging today unremarkable with no evidence of recurrence. Plan next surveillance scan in 1 year.     Regarding PSA, this has returned to normal level of 3.67 and had PIRADS 2 on MRI. As such, will continue monitoring. Will recheck in 1 year.     - Follow up in 12 months with CT for RCC, BMP, and PSA    Orders  Orders Placed This Encounter   Procedures     CT Chest Abdomen Pelvis w/o Contrast     Basic metabolic panel [LAB15]     PSA tumor marker [[id number]]     Video-Visit Details    Type of service:  Video Visit    Video End Time:11:55 AM    Originating Location (pt. Location): Home    Distant Location (provider location):  On-site    [facility]    7 minutes spent on the date of the encounter including direct interaction with the patient, performing chart review, history and exam, documentation and further activities as noted above.    Lino Esparza MD  Urology  [facility]

## 2023-04-13 NOTE — Clinical Note
4/13/2023       RE: Grayson Nam  [address] [facility] [address]     Dear Colleague,    Thank you for referring your patient, Grayson Nam, to the [facility] at [facility]. Please see a copy of my visit note below.    Grayson Nam is a 58 year old male who is being evaluated via a billable video visit.      How would you like to obtain your AVS? MyChart  If the video visit is dropped, the invitation should be resent by: Text to cell phone: [phone]  Will anyone else be joining your video visit? No    Video Start Time: 11:50 AM    CHIEF COMPLAINT   It was my pleasure to see Grayson Nam who is a 58 year old male for follow-up of ***.      HPI  Grayson Nam is a very pleasant 58 year old male who presents with a history of [id number] chromophobe right renal cell carcinoma, s/p laparoscopic nephrectomy 5/1/2019. Overall has done very well since surgery. Cr stable at 1.77.     PSA ruthann to 4.45 and 4.21 on recheck. PIRADS 2 on MRI.  Now with additional recheck PSA down to 3.27. No new symptoms.       CT chest/abd/pelvis 4/6/2023  IMPRESSION:  No recurrent or residual malignancy demonstrated.     PSA   4/6/2023 - 3.67  9/8/2022 - 3.27 (21% free PSA)  4/27/2022 - 4.21  3/11/2022 - 4.45     MRI Prostate 5/27/2022  Size: 52 grams  IMPRESSION:  1. Based on the most suspicious abnormality, this exam is  characterized as PIRADS 2 - Clinically significant cancer is unlikely  to be present.  2. No suspicious adenopathy or evidence of pelvic metastases.           Allergies:   Lisinopril, Animal dander, Atorvastatin, and Novocain [procaine]         Review of Systems:  From intake questionnaire     Skin: negative  Eyes: negative  Ears/Nose/Throat: negative  Respiratory: No shortness of breath, dyspnea on exertion, cough, or hemoptysis  Cardiovascular: No chest pain or palpitations  Gastrointestinal: negative;  "no nausea/vomiting, constipation or diarrhea  Genitourinary: as per HPI  Musculoskeletal: negative  Neurologic: negative  Psychiatric: negative  Hematologic/Lymphatic/Immunologic: negative  Endocrine: negative         Physical Exam:     Vitals:  No vitals were obtained today due to virtual visit.    Physical Exam   {video visit exam brief selected:[id number]::\"GENERAL: Healthy, alert and no distress\",\"EYES: Eyes grossly normal to inspection.  No discharge or erythema, or obvious scleral/conjunctival abnormalities.\",\"RESP: No audible wheeze, cough, or visible cyanosis.  No visible retractions or increased work of breathing.  \",\"SKIN: Visible skin clear. No significant rash, abnormal pigmentation or lesions.\",\"NEURO: Cranial nerves grossly intact.  Mentation and speech appropriate for age.\",\"PSYCH: Mentation appears normal, affect normal/bright, judgement and insight intact, normal speech and appearance well-groomed.\"}      Outside and Past Medical records:    Assessment requiring an independent historian(s) - {:[id number]}  Review of prior external note(s) from - {note reviewed source:084205}  Review of the result(s) of each unique test - ***         Assessment and Plan:     57 year old male with pT2a chromophobe RCC, doing well s/p R laparoscopic nephrectomy 5/1/2019. Labs and imaging today unremarkable with no evidence of recurrence. Plan next surveillance scans in 1 year.     Regarding PSA, this has returned to normal level of 3.27 and had PIRADS 2 on MRI. As such, will continue monitoring. Will recheck in 6 months when he returns for RCC surveillance.     - Follow up in 12 months with CT for RCC, BMP, and PSA    Orders  Orders Placed This Encounter   Procedures     CT Chest Abdomen Pelvis w/o Contrast     Basic metabolic panel [LAB15]     PSA tumor marker [[id number]]         Video-Visit Details    Type of service:  Video Visit    Video End Time:11:55 AM    Originating Location (pt. Location): Home    Distant Location " (provider location):  On-site    Platform used for Video Visit: ClearStream    *** minutes spent on the date of the encounter including direct interaction with the patient, performing chart review, history and exam, documentation and further activities as noted above.    Lino Esparza MD  Urology  [facility]      Grayson Nam is a 58 year old male who is being evaluated via a billable video visit.      How would you like to obtain your AVS? MyChart  If the video visit is dropped, the invitation should be resent by: Text to cell phone: [phone]  Will anyone else be joining your video visit? No    Video Start Time: 11:50 AM    CHIEF COMPLAINT   It was my pleasure to see Grayson Nam who is a 58 year old male for follow-up of Elevated PSA and renal cell carcinoma.      HPI  Grayson Nam is a very pleasant 58 year old male who presents with a history of [id number] chromophobe right renal cell carcinoma, s/p laparoscopic nephrectomy 5/1/2019. Overall has done very well since surgery. Cr stable at 1.77.     PSA ruthann to 4.45 and 4.21 on recheck. PIRADS 2 on MRI 5/27/2022. Has returned to normal levels on recheck.     CT chest/abd/pelvis 4/6/2023  IMPRESSION:  No recurrent or residual malignancy demonstrated.     PSA   4/6/2023 - 3.67  9/8/2022 - 3.27 (21% free PSA)  4/27/2022 - 4.21  3/11/2022 - 4.45     MRI Prostate 5/27/2022  Size: 52 grams  IMPRESSION:  1. Based on the most suspicious abnormality, this exam is  characterized as PIRADS 2 - Clinically significant cancer is unlikely  to be present.  2. No suspicious adenopathy or evidence of pelvic metastases.         Allergies:   Lisinopril, Animal dander, Atorvastatin, and Novocain [procaine]         Review of Systems:  From intake questionnaire     Skin: negative  Eyes: negative  Ears/Nose/Throat: negative  Respiratory: No shortness of breath, dyspnea on exertion, cough, or hemoptysis  Cardiovascular: No chest pain or  palpitations  Gastrointestinal: negative; no nausea/vomiting, constipation or diarrhea  Genitourinary: as per HPI  Musculoskeletal: negative  Neurologic: negative  Psychiatric: negative  Hematologic/Lymphatic/Immunologic: negative  Endocrine: negative         Physical Exam:     Vitals:  No vitals were obtained today due to virtual visit.    Physical Exam   GENERAL: Healthy, alert and no distress  EYES: Eyes grossly normal to inspection.  No discharge or erythema, or obvious scleral/conjunctival abnormalities.  RESP: No audible wheeze, cough, or visible cyanosis.  No visible retractions or increased work of breathing.    SKIN: Visible skin clear. No significant rash, abnormal pigmentation or lesions.  NEURO: Cranial nerves grossly intact.  Mentation and speech appropriate for age.  PSYCH: Mentation appears normal, affect normal/bright, judgement and insight intact, normal speech and appearance well-groomed.      Outside and Past Medical records:    Review of the result(s) of each unique test - CT, BMP, PSA         Assessment and Plan:   58 year old male with pT2a chromophobe RCC, doing well s/p R laparoscopic nephrectomy 5/1/2019. Labs and imaging today unremarkable with no evidence of recurrence. Plan next surveillance scan in 1 year.     Regarding PSA, this has returned to normal level of 3.67 and had PIRADS 2 on MRI. As such, will continue monitoring. Will recheck in 1 year.     - Follow up in 12 months with CT for RCC, BMP, and PSA    Orders  Orders Placed This Encounter   Procedures     CT Chest Abdomen Pelvis w/o Contrast     Basic metabolic panel [LAB15]     PSA tumor marker [[id number]]     Video-Visit Details    Type of service:  Video Visit    Video End Time:11:55 AM    Originating Location (pt. Location): Home    Distant Location (provider location):  On-site    Platform used for Video Visit: Photoways    7 minutes spent on the date of the encounter including direct interaction with the patient, performing chart  review, history and exam, documentation and further activities as noted above.    Lino Esparza MD  Urology  [facility]        Again, thank you for allowing me to participate in the care of your patient.      Sincerely,    Lino Esparza MD

## 2023-04-13 NOTE — LETTER
"2023       RE: Grayson Nam  : 1965   MRN: [id number]      Dear Colleague,    Thank you for referring your patient, Grayson Nam, to the [facility] EPILEPSY CARE at [facility]. Please see a copy of my visit note below.    [facility] Epilepsy Care History and Physical       Patient:  Grayson Nam  :  1965   Age:  58 year old   Today's Office Visit:  2023    Referring Provider:    Referred Self, MD  No address on file    History of Present Illness:  Grayson Nam is 58 year old right handed who presents with one prolonged spell. Accompanied with his wife Sindhu.   Had \"dream like state in which I was doing daily activities, but, I was not actually doing them. I was partially confused, this lasted 2-3 hours\". Denies abnormal taste, or smell, or aurora vu, or panic feeling, or weakness, or sensory loss. He had minor discomfort, EKG was fine yesterday, no shortness of breath. Per Sindhu \"he was out of it, he was having recurrent thoughts, tired, stress with grandkid age 5 and 3, he was able to talk, he was able to answer questions. He did not know the date, he did not know president\". Since yesterday he has nausea, headache. He had right arm pain started 3 weeks ago. His wife has not noticed convulsion, he has bruises on leg or hand which may have happened overnight. He has sleep apnea and uses CPAP, he had it evaluated CPAP in 2023, sleeps 7 hours, he has chronic renal issues with no major changes. He started herbal tea 1 week ago (chammomile, ruthann hip, charley, lemon peel, orange peel). He states he does not get headaches, pain is in the middle of head, dull persistent pain, pain 7/10, no sensitivity to light or noise, he has nausea, he has not taken any pain medications. Sindhu states he does get headaches once a month.     Epilepsy Risk Factors:  Patient has no history of encephalitis/meningitis, no history of stroke, no " history of tumor, no history of traumatic brain injury.  The patient had a normal birth and delivery.  No developmental delays noted.  No febrile seizures.  No family members with epilepsy.     Precipitating factors:   stress  Current Outpatient Medications   Medication Sig Dispense Refill    acetaminophen (TYLENOL) 325 MG tablet Take 325-650 mg by mouth every 6 hours as needed for mild pain      amLODIPine (NORVASC) 5 MG tablet       ASPIRIN LOW DOSE 81 MG EC tablet       buPROPion (WELLBUTRIN SR) 150 MG 12 hr tablet Take 150 mg by mouth      Multiple Vitamins-Minerals (OCUVITE PRESERVISION PO)       rosuvastatin (CRESTOR) 10 MG tablet Take 10 mg by mouth daily      vitamin D2 (ERGOCALCIFEROL) 87625 units (1250 mcg) capsule Take 50,000 Units by mouth once a week      albuterol (PROAIR HFA/PROVENTIL HFA/VENTOLIN HFA) 108 (90 Base) MCG/ACT inhaler Inhale 1-2 puffs into the lungs every 6 hours as needed for shortness of breath / dyspnea or wheezing      amLODIPine (NORVASC) 2.5 MG tablet Take 2.5 mg by mouth At Bedtime   1    buPROPion (WELLBUTRIN) 75 MG tablet Take 150 mg by mouth 2 times daily       fluticasone-salmeterol (ADVAIR) 100-50 MCG/DOSE diskus inhaler Inhale 1 puff into the lungs 2 times daily as needed       pravastatin (PRAVACHOL) 20 MG tablet Take 20 mg by mouth              View : No data to display.              Past Medical History:   Diagnosis Date    Adjustment disorder with anxiety     Cough variant asthma     Depressive disorder     Diverticulosis     Essential hypertension, benign     Hypertension, Benign    High cholesterol     History of blood transfusion     Kidney disease     CKD stage 3    Mixed hyperlipidemia     Proteinuria       Past Surgical History:   Procedure Laterality Date    APPENDECTOMY OPEN  1971    COLONOSCOPY      LAPAROSCOPIC HERNIORRHAPHY INGUINAL Left 2/15/2018    Procedure: LAPAROSCOPIC HERNIORRHAPHY INGUINAL;  LAPAROSCOPIC LEFT INGUINAL HERNIA REPAIR WITH MESH;  Surgeon:  "Tahir Sena MD;  Location:  [facility]    LAPAROSCOPIC NEPHRECTOMY Right 5/1/2019    Procedure: RIGHT LAPAROSCOPIC RADICAL NEPHRECTOMY;  Surgeon: Lino Esparza MD;  Location:  [facility]     Family History   Problem Relation Age of Onset    Hypertension Father     Cerebrovascular Disease Father     Diabetes Maternal Grandmother     Cerebrovascular Disease Maternal Grandfather     Unknown/Adopted Paternal Grandmother     Unknown/Adopted Paternal Grandfather       Psycho-Social History: Grayson Nam currently lives with wife, [employer], works for city [employer].  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 He has one hour commute for work one way.   Currently, patient denies feeling depressed, denies feeling anhedonia, denies suicidal  thoughts, and denies having feelings of excessive guilt/worthlessness.  Does not smoke, rare alcohol use, no recreational drug use. We reviewed importance of mental and emotional wellbeing and impact on health.   Driving:  Currently patient is:  Not driving  Previous Evaluations for Epilepsy:   EEG: none  MRI of Brain: none  Exam:    BP (!) 142/90 (BP Location: Right arm, Patient Position: Sitting, Cuff Size: Adult Regular)   Pulse 88   Temp 98.1  F (36.7  C) (Temporal)   Ht 5' 7\" (170.2 cm)   Wt 221 lb 12.8 oz (100.6 kg)   BMI 34.74 kg/m       Wt Readings from Last 5 Encounters:   04/13/23 221 lb 12.8 oz (100.6 kg)   02/26/20 195 lb (88.5 kg)   12/06/19 199 lb 3.3 oz (90.4 kg)   08/28/19 209 lb 12.8 oz (95.2 kg)   05/22/19 219 lb 3.2 oz (99.4 kg)       Alert, orientated, speech is fluent, pupils are equal, round, face symmetric, no pronator drip, equal  strength, normal to light touch with no sensory deficits noted, finger to nose normal, no focal deficits noted. Gait is stable. Wide based gait.       Impression:    Transient confusional and dreamlike state may be migraine vs seizure vs vascular cause     Discussion: "   Grayson Nam is 58 year old right handed who presents with one prolonged spell. Transient confusional and dreamlike state may be migraine vs seizure vs vascular cause. At this time we will complete more test and evaluate. No antiepileptic drug at this time. I am not sure if this spell is seizure.     Plan :      No seizure medications   No driving   EEG  MRI brain (no contrast)   MRA neck and head (no contrast)   Letter for work   Start riboflavin 400 mg per day (check with kidney doctor if this dose if safe) and take tylenol   Increase self care - stress management   Follow up with Dr. Junior after MRI with next available   Consider talk therapist - consult     The patient was advised to maintain proper seizure precautions. [place] regulations on driving were reviewed with the patient. The patient clearly understands that she/he is prohibited from operating a motor vehicle within 3 months following any seizure or other episode with sudden unconsciousness or inability to sit up, and that it is required to report most recent seizure to the DMV within 30 days after the event.    Patient was advised to avoid any activities that might lead to self-injury or injury of others, within 3 months following any seizure with impaired awareness or impaired motor control such activities include but are not limited to operating power tools, operating firearms, climbing ladders/trees/exposure to heights from which he might fall. Patient should not operate power tools or heavy machinery and equipment.  Patient was advised not to swim alone.  Patient should not bathe in any form of tub, such as bathtub, jacuzzi, or hot tub unless there is a responsible adult close by to provide assistance in case she has a seizure and drowns. Patient should not work on hot surfaces such stoves, ovens, or with scalding hot water.         I spent 71 minutes in total today to provide comprehensive  medical care.   I spent 5 minutes writing the  note and placing orders.   I spent 4 minutes  reviewing the chart.     The rest of the time was spent with the patient in face to face interview. During this time key medical decisions were made with review of medical chart prior to visit, visit with patient, counseling/education, and post visit work, including documentation of note on the day of visit. I addressed all questions the patient/caregiver raised in regards to epilepsy or related medical questions.           Again, thank you for allowing me to participate in the care of your patient.      Sincerely,    Cora Junior MD

## 2023-04-14 ENCOUNTER — ANCILLARY PROCEDURE (OUTPATIENT)
Dept: NEUROLOGY | Facility: CLINIC | Age: 58
End: 2023-04-14
Attending: PSYCHIATRY & NEUROLOGY
Payer: COMMERCIAL

## 2023-04-14 DIAGNOSIS — R56.9 SEIZURES (H): ICD-10-CM

## 2023-05-12 ENCOUNTER — HOSPITAL ENCOUNTER (OUTPATIENT)
Dept: MRI IMAGING | Facility: CLINIC | Age: 58
Discharge: HOME | End: 2023-05-12
Attending: PSYCHIATRY & NEUROLOGY
Payer: COMMERCIAL

## 2023-05-12 DIAGNOSIS — R40.4 NONSPECIFIC PAROXYSMAL SPELL: ICD-10-CM

## 2023-05-12 PROCEDURE — 70544 MR ANGIOGRAPHY HEAD W/O DYE: CPT

## 2023-05-12 PROCEDURE — 70551 MRI BRAIN STEM W/O DYE: CPT

## 2023-05-12 PROCEDURE — 70547 MR ANGIOGRAPHY NECK W/O DYE: CPT

## 2023-05-16 ENCOUNTER — MYC MEDICAL ADVICE (OUTPATIENT)
Dept: NEUROLOGY | Facility: CLINIC | Age: 58
End: 2023-05-16

## 2023-07-13 ENCOUNTER — MYC MEDICAL ADVICE (OUTPATIENT)
Dept: NEUROLOGY | Facility: CLINIC | Age: 58
End: 2023-07-13

## 2023-07-14 ENCOUNTER — TELEPHONE (OUTPATIENT)
Dept: NEUROLOGY | Facility: CLINIC | Age: 58
End: 2023-07-14

## 2023-07-17 ENCOUNTER — TELEPHONE (OUTPATIENT)
Dept: PSYCHIATRY | Facility: CLINIC | Age: 58
End: 2023-07-17
Payer: COMMERCIAL

## 2023-09-28 ENCOUNTER — APPOINTMENT (OUTPATIENT)
Dept: URBAN - METROPOLITAN AREA CLINIC 259 | Age: 58
Setting detail: DERMATOLOGY
End: 2023-09-28

## 2023-09-28 VITALS — HEIGHT: 67 IN | WEIGHT: 219 LBS

## 2023-09-28 DIAGNOSIS — L82.0 INFLAMED SEBORRHEIC KERATOSIS: ICD-10-CM

## 2023-09-28 DIAGNOSIS — L81.4 OTHER MELANIN HYPERPIGMENTATION: ICD-10-CM

## 2023-09-28 DIAGNOSIS — D22 MELANOCYTIC NEVI: ICD-10-CM

## 2023-09-28 DIAGNOSIS — L82.1 OTHER SEBORRHEIC KERATOSIS: ICD-10-CM

## 2023-09-28 DIAGNOSIS — D18.0 HEMANGIOMA: ICD-10-CM

## 2023-09-28 DIAGNOSIS — Z85.828 PERSONAL HISTORY OF OTHER MALIGNANT NEOPLASM OF SKIN: ICD-10-CM

## 2023-09-28 DIAGNOSIS — Z71.89 OTHER SPECIFIED COUNSELING: ICD-10-CM

## 2023-09-28 DIAGNOSIS — L57.8 OTHER SKIN CHANGES DUE TO CHRONIC EXPOSURE TO NONIONIZING RADIATION: ICD-10-CM

## 2023-09-28 DIAGNOSIS — Z87.2 PERSONAL HISTORY OF DISEASES OF THE SKIN AND SUBCUTANEOUS TISSUE: ICD-10-CM

## 2023-09-28 PROBLEM — D18.01 HEMANGIOMA OF SKIN AND SUBCUTANEOUS TISSUE: Status: ACTIVE | Noted: 2023-09-28

## 2023-09-28 PROBLEM — D22.5 MELANOCYTIC NEVI OF TRUNK: Status: ACTIVE | Noted: 2023-09-28

## 2023-09-28 PROCEDURE — 99213 OFFICE O/P EST LOW 20 MIN: CPT | Mod: 25

## 2023-09-28 PROCEDURE — 17110 DESTRUCT B9 LESION 1-14: CPT

## 2023-09-28 PROCEDURE — OTHER MIPS QUALITY: OTHER

## 2023-09-28 PROCEDURE — OTHER LIQUID NITROGEN: OTHER

## 2023-09-28 PROCEDURE — OTHER COUNSELING: OTHER

## 2023-09-28 ASSESSMENT — LOCATION DETAILED DESCRIPTION DERM
LOCATION DETAILED: RIGHT CENTRAL ZYGOMA
LOCATION DETAILED: LEFT MEDIAL UPPER BACK
LOCATION DETAILED: LEFT INFERIOR MEDIAL UPPER BACK
LOCATION DETAILED: RIGHT INFERIOR FRONTAL SCALP
LOCATION DETAILED: INFERIOR THORACIC SPINE
LOCATION DETAILED: RIGHT SUPERIOR MEDIAL UPPER BACK

## 2023-09-28 ASSESSMENT — LOCATION ZONE DERM
LOCATION ZONE: TRUNK
LOCATION ZONE: FACE
LOCATION ZONE: SCALP

## 2023-09-28 ASSESSMENT — LOCATION SIMPLE DESCRIPTION DERM
LOCATION SIMPLE: RIGHT UPPER BACK
LOCATION SIMPLE: LEFT UPPER BACK
LOCATION SIMPLE: RIGHT ZYGOMA
LOCATION SIMPLE: SCALP
LOCATION SIMPLE: UPPER BACK

## 2023-09-28 NOTE — PROCEDURE: LIQUID NITROGEN
Add 52 Modifier (Optional): no
Detail Level: Detailed
Consent: The patient's consent was obtained including but not limited to risks of crusting, scabbing, blistering, scarring, darker or lighter pigmentary change, recurrence, incomplete removal and infection.
Medical Necessity Information: It is in your best interest to select a reason for this procedure from the list below. All of these items fulfill various CMS LCD requirements except the new and changing color options.
Show Spray Paint Technique Variable?: Yes
Medical Necessity Clause: This procedure was medically necessary because the lesions that were treated were:
Spray Paint Text: The liquid nitrogen was applied to the skin utilizing a spray paint frosting technique.
Post-Care Instructions: I reviewed with the patient in detail post-care instructions. Patient is to wear sunprotection, and avoid picking at any of the treated lesions. Pt may apply Vaseline to crusted or scabbing areas.

## 2023-10-16 ENCOUNTER — VIRTUAL VISIT (OUTPATIENT)
Dept: NEUROLOGY | Facility: CLINIC | Age: 58
End: 2023-10-16
Payer: COMMERCIAL

## 2023-10-16 VITALS
WEIGHT: 223 LBS | DIASTOLIC BLOOD PRESSURE: 100 MMHG | BODY MASS INDEX: 35 KG/M2 | SYSTOLIC BLOOD PRESSURE: 150 MMHG | HEIGHT: 67 IN

## 2023-10-16 DIAGNOSIS — R40.4 NONSPECIFIC PAROXYSMAL SPELL: Primary | ICD-10-CM

## 2023-10-16 NOTE — NURSING NOTE
Is the patient currently in the state of [place]? YES    Visit mode:VIDEO    If the visit is dropped, the patient can be reconnected by: VIDEO VISIT: Send to e-mail at: [email or web address]    Will anyone else be joining the visit? NO  (If patient encounters technical issues they should call [phone])    How would you like to obtain your AVS? MyChart    Are changes needed to the allergy or medication list? No    Reason for visit: Video Visit (Follow Up )    Dayana BERMUDEZ

## 2023-10-16 NOTE — PROGRESS NOTES
"Virtual Visit Details    Type of service:  Video Visit   Video Start: 4:14 pm   Video End: 4:22 pm   Originating Location (pt. Location): Home    Distant Location (provider location):  Off-site  Platform used for Video Visit: Garcia        [facility] Epilepsy Care Progress Note    Patient:  Grayson Nam  :  1965   Age:  58 year old   Today's Office Visit:  10/16/2023      History of Present Illness:  Grayson Nam is 58 year old right handed who presents with one prolonged spell. Accompanied with his wife Nannette   Had \"dream like state in which I was doing daily activities, but, I was not actually doing them. I was partially confused, this lasted 2-3 hours\". Denies abnormal taste, or smell, or aurora vu, or panic feeling, or weakness, or sensory loss. He had minor discomfort, EKG was fine yesterday, no shortness of breath. Per Sindhu \"he was out of it, he was having recurrent thoughts, tired, stress with grandkid age 5 and 3, he was able to talk, he was able to answer questions. He did not know the date, he did not know president\". Since yesterday he has nausea, headache. He had right arm pain started 3 weeks ago. His wife has not noticed convulsion, he has bruises on leg or hand which may have happened overnight. He has sleep apnea and uses CPAP, he had it evaluated CPAP in 2023, sleeps 7 hours, he has chronic renal issues with no major changes. He started herbal tea 1 week ago (chammomile, ruthann hip, charley, lemon peel, orange peel). He states he does not get headaches, pain is in the middle of head, dull persistent pain, pain 7/10, no sensitivity to light or noise, he has nausea, he has not taken any pain medications. Sindhu states he does get headaches once a month.   Interval History:    He had a spells 2023 and after that he has not had another spell.  Patient had MRI of the brain, MR angiogram, EEG completed which was within normal limits.  Looking back at the event he attributes this " spell to high stress.  He states he felt really overwhelmed and exhausted with his grandchildren and he was not able to run around after them.  He is using his CPAP machine at night for his sleep apnea.  We reviewed his MRI and EEG results and discussed follow-up.  His headaches are still once a month despite taking riboflavin, not entirely certain if riboflavin is helpful for him.  I encouraged him to stop riboflavin to determine if headaches get worse.  Current Outpatient Medications   Medication Sig Dispense Refill    acetaminophen (TYLENOL) 325 MG tablet Take 325-650 mg by mouth every 6 hours as needed for mild pain      albuterol (PROAIR HFA/PROVENTIL HFA/VENTOLIN HFA) 108 (90 Base) MCG/ACT inhaler Inhale 1-2 puffs into the lungs every 6 hours as needed for shortness of breath / dyspnea or wheezing      amLODIPine (NORVASC) 5 MG tablet       ASPIRIN LOW DOSE 81 MG EC tablet       buPROPion (WELLBUTRIN SR) 150 MG 12 hr tablet Take 150 mg by mouth      fluticasone-salmeterol (ADVAIR) 100-50 MCG/DOSE diskus inhaler Inhale 1 puff into the lungs 2 times daily as needed       Multiple Vitamins-Minerals (OCUVITE PRESERVISION PO)       riboflavin 400 MG CAPS 1 capsule daily 90 capsule 3    rosuvastatin (CRESTOR) 10 MG tablet Take 10 mg by mouth daily      vitamin D2 (ERGOCALCIFEROL) 59528 units (1250 mcg) capsule Take 50,000 Units by mouth once a week      amLODIPine (NORVASC) 2.5 MG tablet Take 2.5 mg by mouth At Bedtime   1    buPROPion (WELLBUTRIN) 75 MG tablet Take 150 mg by mouth 2 times daily       pravastatin (PRAVACHOL) 20 MG tablet Take 20 mg by mouth       Psycho-Social History: Grayson Nam currently lives with wife, [place], works for [employer].   He has one hour commute for work one way.   Currently, patient denies feeling depressed, denies feeling anhedonia, denies suicidal  thoughts, and denies having feelings of excessive guilt/worthlessness.  Does not smoke, rare alcohol use,  "no recreational drug use. We reviewed importance of mental and emotional wellbeing and impact on health.   Driving:  Currently patient is:  Not driving  Previous Evaluations for Epilepsy:   EEG: none  MRI of Brain: none  Exam:    BP (!) 150/100   Ht 5' 7\" (170.2 cm)   Wt 223 lb (101.2 kg)   BMI 34.93 kg/m       Wt Readings from Last 5 Encounters:   10/16/23 223 lb (101.2 kg)   04/13/23 221 lb 12.8 oz (100.6 kg)   02/26/20 195 lb (88.5 kg)   12/06/19 199 lb 3.3 oz (90.4 kg)   08/28/19 209 lb 12.8 oz (95.2 kg)       Alert, orientated, speech is fluent, face symmetric, no pronator drip, no upper extremity weakness.       Impression:    Transient confusional and dreamlike state may be migraine vs seizure vs vascular cause     Discussion:   Grayson Nam is 58 year old right handed who presents with one prolonged spell. Transient confusional and dreamlike state may be migraine vs seizure vs vascular cause vs stress related. His EEG, MRA brain, and MRI brain was normal. It seems the event in April 2023 was related to high stress as he was taking care of of 2 toddler grandkids.  He has not had recurrent spells since April 2023.  I do not recommend antiseizure medication because I do not suspect this was a seizure.  I suspect it was a transient confusional state in the setting of high stress.       Plan :      No seizure medications   No driving   Continue riboflavin 400 mg per day (check with kidney doctor if this dose if safe) and take tylenol   Increase self care - stress management   Follow up with Dr. Junior as needed    Consider talk therapist - declined  DMV will send to [facility] (spell was 4/2023).     I spent 11 minutes in total today to provide comprehensive  medical care.   I spent 4 minutes writing the note and placing orders.   I spent 1 minutes  reviewing the chart.     The rest of the time was spent with the patient in face to face interview. During this time key medical decisions were made with review " of medical chart prior to visit, visit with patient, counseling/education, and post visit work, including documentation of note on the day of visit. I addressed all questions the patient/caregiver raised in regards to epilepsy or related medical questions.       Cora Junior MD   Epilepsy Staff

## 2023-10-16 NOTE — LETTER
"10/16/2023       RE: Grayson Nam  : 1965   MRN: [id number]      Dear Colleague,    Thank you for referring your patient, Grayson Nam, to the [facility] EPILEPSY CARE at [facility]. Please see a copy of my visit note below.      [facility] Epilepsy Care Progress Note    Patient:  Grayson Nam  :  1965   Age:  58 year old   Today's Office Visit:  10/16/2023      History of Present Illness:  Grayson Nam is 58 year old right handed who presents with one prolonged spell. Accompanied with his wife Sindhu.   Had \"dream like state in which I was doing daily activities, but, I was not actually doing them. I was partially confused, this lasted 2-3 hours\". Denies abnormal taste, or smell, or aurora vu, or panic feeling, or weakness, or sensory loss. He had minor discomfort, EKG was fine yesterday, no shortness of breath. Per Sindhu \"he was out of it, he was having recurrent thoughts, tired, stress with grandkid age 5 and 3, he was able to talk, he was able to answer questions. He did not know the date, he did not know president\". Since yesterday he has nausea, headache. He had right arm pain started 3 weeks ago. His wife has not noticed convulsion, he has bruises on leg or hand which may have happened overnight. He has sleep apnea and uses CPAP, he had it evaluated CPAP in 2023, sleeps 7 hours, he has chronic renal issues with no major changes. He started herbal tea 1 week ago (chammomile, ruthann hip, charley, lemon peel, orange peel). He states he does not get headaches, pain is in the middle of head, dull persistent pain, pain 7/10, no sensitivity to light or noise, he has nausea, he has not taken any pain medications. Sindhu states he does get headaches once a month.   Interval History:    He had a spells 2023 and after that he has not had another spell.  Patient had MRI of the brain, MR angiogram, EEG completed which was " within normal limits.  Looking back at the event he attributes this spell to high stress.  He states he felt really overwhelmed and exhausted with his grandchildren and he was not able to run around after them.  He is using his CPAP machine at night for his sleep apnea.  We reviewed his MRI and EEG results and discussed follow-up.  His headaches are still once a month despite taking riboflavin, not entirely certain if riboflavin is helpful for him.  I encouraged him to stop riboflavin to determine if headaches get worse.  Current Outpatient Medications   Medication Sig Dispense Refill    acetaminophen (TYLENOL) 325 MG tablet Take 325-650 mg by mouth every 6 hours as needed for mild pain      albuterol (PROAIR HFA/PROVENTIL HFA/VENTOLIN HFA) 108 (90 Base) MCG/ACT inhaler Inhale 1-2 puffs into the lungs every 6 hours as needed for shortness of breath / dyspnea or wheezing      amLODIPine (NORVASC) 5 MG tablet       ASPIRIN LOW DOSE 81 MG EC tablet       buPROPion (WELLBUTRIN SR) 150 MG 12 hr tablet Take 150 mg by mouth      fluticasone-salmeterol (ADVAIR) 100-50 MCG/DOSE diskus inhaler Inhale 1 puff into the lungs 2 times daily as needed       Multiple Vitamins-Minerals (OCUVITE PRESERVISION PO)       riboflavin 400 MG CAPS 1 capsule daily 90 capsule 3    rosuvastatin (CRESTOR) 10 MG tablet Take 10 mg by mouth daily      vitamin D2 (ERGOCALCIFEROL) 40412 units (1250 mcg) capsule Take 50,000 Units by mouth once a week      amLODIPine (NORVASC) 2.5 MG tablet Take 2.5 mg by mouth At Bedtime   1    buPROPion (WELLBUTRIN) 75 MG tablet Take 150 mg by mouth 2 times daily       pravastatin (PRAVACHOL) 20 MG tablet Take 20 mg by mouth       Psycho-Social History: Grayson Nam currently lives with wife, [place], works for [employer].   He has one hour commute for work one way.   Currently, patient denies feeling depressed, denies feeling anhedonia, denies suicidal  thoughts, and denies having feelings  "of excessive guilt/worthlessness.  Does not smoke, rare alcohol use, no recreational drug use. We reviewed importance of mental and emotional wellbeing and impact on health.   Driving:  Currently patient is:  Not driving  Previous Evaluations for Epilepsy:   EEG: none  MRI of Brain: none  Exam:    BP (!) 150/100   Ht 5' 7\" (170.2 cm)   Wt 223 lb (101.2 kg)   BMI 34.93 kg/m       Wt Readings from Last 5 Encounters:   10/16/23 223 lb (101.2 kg)   04/13/23 221 lb 12.8 oz (100.6 kg)   02/26/20 195 lb (88.5 kg)   12/06/19 199 lb 3.3 oz (90.4 kg)   08/28/19 209 lb 12.8 oz (95.2 kg)       Alert, orientated, speech is fluent, face symmetric, no pronator drip, no upper extremity weakness.       Impression:    Transient confusional and dreamlike state may be migraine vs seizure vs vascular cause     Discussion:   Grayson Nam is 58 year old right handed who presents with one prolonged spell. Transient confusional and dreamlike state may be migraine vs seizure vs vascular cause vs stress related. His EEG, MRA brain, and MRI brain was normal. It seems the event in April 2023 was related to high stress as he was taking care of of 2 toddler grandkids.  He has not had recurrent spells since April 2023.  I do not recommend antiseizure medication because I do not suspect this was a seizure.  I suspect it was a transient confusional state in the setting of high stress.       Plan :      No seizure medications   No driving   Continue riboflavin 400 mg per day (check with kidney doctor if this dose if safe) and take tylenol   Increase self care - stress management   Follow up with Dr. Junior as needed    Consider talk therapist - declined  [facility]V will [facility] (spell was 4/2023).     I spent 11 minutes in total today to provide comprehensive  medical care.   I spent 4 minutes writing the note and placing orders.   I spent 1 minutes  reviewing the chart.     The rest of the time was spent with the patient in face to face " interview. During this time key medical decisions were made with review of medical chart prior to visit, visit with patient, counseling/education, and post visit work, including documentation of note on the day of visit. I addressed all questions the patient/caregiver raised in regards to epilepsy or related medical questions.           Again, thank you for allowing me to participate in the care of your patient.      Sincerely,    Cora Junior MD

## 2024-03-08 ENCOUNTER — MYC MEDICAL ADVICE (OUTPATIENT)
Dept: NEUROLOGY | Facility: CLINIC | Age: 59
End: 2024-03-08

## 2024-05-23 ENCOUNTER — ANCILLARY PROCEDURE (OUTPATIENT)
Dept: CT IMAGING | Facility: CLINIC | Age: 59
End: 2024-05-23
Attending: UROLOGY
Payer: COMMERCIAL

## 2024-05-23 ENCOUNTER — LAB (OUTPATIENT)
Dept: LAB | Facility: CLINIC | Age: 59
End: 2024-05-23
Payer: COMMERCIAL

## 2024-05-23 DIAGNOSIS — C64.1 RENAL CELL CARCINOMA, RIGHT (H): ICD-10-CM

## 2024-05-23 DIAGNOSIS — R97.20 ELEVATED PROSTATE SPECIFIC ANTIGEN (PSA): ICD-10-CM

## 2024-05-23 DIAGNOSIS — R40.4 NONSPECIFIC PAROXYSMAL SPELL: ICD-10-CM

## 2024-05-23 LAB
ANION GAP SERPL CALCULATED.3IONS-SCNC: 14 MMOL/L (ref 7–15)
BUN SERPL-MCNC: 23.1 MG/DL (ref 8–23)
CALCIUM SERPL-MCNC: 9.2 MG/DL (ref 8.8–10.2)
CHLORIDE SERPL-SCNC: 104 MMOL/L (ref 98–107)
CREAT SERPL-MCNC: 1.93 MG/DL (ref 0.67–1.17)
DEPRECATED HCO3 PLAS-SCNC: 20 MMOL/L (ref 22–29)
EGFRCR SERPLBLD CKD-EPI 2021: 39 ML/MIN/1.73M2
GLUCOSE SERPL-MCNC: 146 MG/DL (ref 70–99)
POTASSIUM SERPL-SCNC: 4.7 MMOL/L (ref 3.4–5.3)
PSA SERPL DL<=0.01 NG/ML-MCNC: 4.66 NG/ML (ref 0–4.5)
SODIUM SERPL-SCNC: 138 MMOL/L (ref 135–145)

## 2024-05-23 PROCEDURE — 84153 ASSAY OF PSA TOTAL: CPT

## 2024-05-23 PROCEDURE — 71250 CT THORAX DX C-: CPT

## 2024-05-23 PROCEDURE — 36415 COLL VENOUS BLD VENIPUNCTURE: CPT

## 2024-05-23 PROCEDURE — 80048 BASIC METABOLIC PNL TOTAL CA: CPT

## 2024-05-30 ENCOUNTER — VIRTUAL VISIT (OUTPATIENT)
Dept: UROLOGY | Facility: CLINIC | Age: 59
End: 2024-05-30
Payer: COMMERCIAL

## 2024-05-30 DIAGNOSIS — C64.1 RENAL CELL CARCINOMA, RIGHT (H): Primary | ICD-10-CM

## 2024-05-30 PROCEDURE — 99213 OFFICE O/P EST LOW 20 MIN: CPT | Mod: 95 | Performed by: UROLOGY

## 2024-05-30 ASSESSMENT — PAIN SCALES - GENERAL: PAINLEVEL: NO PAIN (0)

## 2024-05-30 NOTE — Clinical Note
5/30/2024       RE: Grayson Nam  [id number] [facility] [address]     Dear Colleague,    Thank you for referring your patient, Grayson Nam, to the [facility]OLOG[facility] at [facility]. Please see a copy of my visit note below.    Grayson Nam is a 59 year old male who is being evaluated via a billable video visit.      How would you like to obtain your AVS? MyChart  If the video visit is dropped, the invitation should be resent by: Text to cell phone: [phone]  Will anyone else be joining your video visit? No    Video Start Time: 4:36 PM    CHIEF COMPLAINT   It was my pleasure to see Grayson Nam who is a 59 year old male for follow-up of renal cell carcinoma.      HPI  Grayson Nam is a very pleasant 59 year old male who presents with a history of [id number] chromophobe right renal cell carcinoma, s/p laparoscopic nephrectomy 5/1/2019. Overall has done very well since surgery.     Cr 1.93.     PSA ruthann to 4.45 and 4.21 on recheck. PIRADS 2 on MRI 5/27/2022. Has returned to normal levels on recheck.  PSA 4.66     CT chest/abd/pelvis 5/23/2024  IMPRESSION:  1.  Right nephrectomy without evidence of recurrent or metastatic disease in the chest, abdomen, or pelvis.     PSA   5/23/2024 - 4.66  4/6/2023 - 3.67  9/8/2022 - 3.27 (21% free PSA)  4/27/2022 - 4.21  3/11/2022 - 4.45     MRI Prostate 5/27/2022  Size: 52 grams  IMPRESSION:  1. Based on the most suspicious abnormality, this exam is  characterized as PIRADS 2 - Clinically significant cancer is unlikely  to be present.  2. No suspicious adenopathy or evidence of pelvic metastases.         Allergies:   Lisinopril, Animal dander, Atorvastatin, Lidocaine, Novocain [procaine], and Ranitidine         Review of Systems:  From intake questionnaire     Skin: negative  Eyes: negative  Ears/Nose/Throat: negative  Respiratory: No shortness of breath, dyspnea on  exertion, cough, or hemoptysis  Cardiovascular: No chest pain or palpitations  Gastrointestinal: negative; no nausea/vomiting, constipation or diarrhea  Genitourinary: as per HPI  Musculoskeletal: negative  Neurologic: negative  Psychiatric: negative  Hematologic/Lymphatic/Immunologic: negative  Endocrine: negative         Physical Exam:     Vitals:  No vitals were obtained today due to virtual visit.    Physical Exam   EYES: Eyes grossly normal to inspection.  No discharge or erythema, or obvious scleral/conjunctival abnormalities.  SKIN: Visible skin clear. No significant rash, abnormal pigmentation or lesions.  NEURO: Cranial nerves grossly intact.  Mentation and speech appropriate for age.  GENERAL: Healthy, alert and no distress  RESP: No audible wheeze, cough, or visible cyanosis.  No visible retractions or increased work of breathing.    PSYCH: Mentation appears normal, affect normal/bright, judgement and insight intact, normal speech and appearance well-groomed.      Outside and Past Medical records:    Assessment requiring an independent historian(s) - {:[id number]}  Review of prior external note(s) from - {note reviewed source:[id number]}  Review of the result(s) of each unique test - ***         Assessment and Plan:     59 year old male with pT2a chromophobe RCC, doing well s/p R laparoscopic nephrectomy 5/1/2019. Labs and imaging today unremarkable with no evidence of recurrence. Plan next surveillance scan in 1 year.     Regarding PSA, this has returned to normal level of 3.67 and had PIRADS 2 on MRI. As such, will continue monitoring. Will recheck in 1 year.     - follow-up with me prn  - PSA in 1 year with his PCP    Orders  No orders of the defined types were placed in this encounter.        Video-Visit Details    Type of service:  Video Visit    Video End Time:4:44 PM    Originating Location (pt. Location): Home    Distant Location (provider location):  On-site    Platform used for Video Visit:  Garcia    *** minutes spent on the date of the encounter including direct interaction with the patient, performing chart review, history and exam, documentation and further activities as noted above.    Lino Esparza MD  Urology  [facility]      Grayson Nam is a 59 year old male who is being evaluated via a billable video visit.      How would you like to obtain your AVS? MyChart  If the video visit is dropped, the invitation should be resent by: Text to cell phone: [phone]  Will anyone else be joining your video visit? No    Video Start Time: 4:36 PM    CHIEF COMPLAINT   It was my pleasure to see Grayson Nam who is a 59 year old male for follow-up of renal cell carcinoma.      HPI  Grayson Nam is a very pleasant 59 year old male who presents with a history of [id number] chromophobe right renal cell carcinoma, s/p laparoscopic nephrectomy 5/1/2019. Overall has done very well since surgery.     Cr 1.93.     PSA ruthann to 4.45 and 4.21 on recheck. PIRADS 2 on MRI 5/27/2022. Has returned to normal levels on recheck.  PSA 4.66     CT chest/abd/pelvis 5/23/2024  IMPRESSION:  1.  Right nephrectomy without evidence of recurrent or metastatic disease in the chest, abdomen, or pelvis.     PSA   5/23/2024 - 4.66  4/6/2023 - 3.67  9/8/2022 - 3.27 (21% free PSA)  4/27/2022 - 4.21  3/11/2022 - 4.45     MRI Prostate 5/27/2022  Size: 52 grams  IMPRESSION:  1. Based on the most suspicious abnormality, this exam is  characterized as PIRADS 2 - Clinically significant cancer is unlikely  to be present.  2. No suspicious adenopathy or evidence of pelvic metastases.         Allergies:   Lisinopril, Animal dander, Atorvastatin, Lidocaine, Novocain [procaine], and Ranitidine         Review of Systems:  From intake questionnaire     Skin: negative  Eyes: negative  Ears/Nose/Throat: negative  Respiratory: No shortness of breath, dyspnea on exertion, cough, or hemoptysis  Cardiovascular: No  chest pain or palpitations  Gastrointestinal: negative; no nausea/vomiting, constipation or diarrhea  Genitourinary: as per HPI  Musculoskeletal: negative  Neurologic: negative  Psychiatric: negative  Hematologic/Lymphatic/Immunologic: negative  Endocrine: negative         Physical Exam:     Vitals:  No vitals were obtained today due to virtual visit.    Physical Exam   EYES: Eyes grossly normal to inspection.  No discharge or erythema, or obvious scleral/conjunctival abnormalities.  SKIN: Visible skin clear. No significant rash, abnormal pigmentation or lesions.  NEURO: Cranial nerves grossly intact.  Mentation and speech appropriate for age.  GENERAL: Healthy, alert and no distress  RESP: No audible wheeze, cough, or visible cyanosis.  No visible retractions or increased work of breathing.    PSYCH: Mentation appears normal, affect normal/bright, judgement and insight intact, normal speech and appearance well-groomed.      Outside and Past Medical records:    Review of the result(s) of each unique test - CT, creat, PSA         Assessment and Plan:     59 year old male with pT2a chromophobe RCC, doing well s/p R laparoscopic nephrectomy 5/1/2019. Labs and imaging today unremarkable with no evidence of recurrence. Given it has been 5 years since surgery, annual surveillance imaging is no longer indicated.     Regarding PSA, this has fluctuated some  and had PIRADS 2 on MRI. As such, will continue monitoring. Will recheck in 1 year with PCP.     - follow-up with me as needed  - PSA in 1 year with his PCP    Video-Visit Details    Type of service:  Video Visit    Video End Time:4:44 PM    Originating Location (pt. Location): Home    Distant Location (provider location):  On-site    Platform used for Video Visit: Able[employer]Well    8 minutes spent on the date of the encounter including direct interaction with the patient, performing chart review, history and exam, documentation and further activities as noted above.    Lino  MD Vilma  Urology  [facility]        Again, thank you for allowing me to participate in the care of your patient.      Sincerely,    Lino Esparza MD

## 2024-05-30 NOTE — PROGRESS NOTES
Grayson Nam is a 59 year old male who is being evaluated via a billable video visit.      How would you like to obtain your AVS? MyChart  If the video visit is dropped, the invitation should be resent by: Text to cell phone: [phone]  Will anyone else be joining your video visit? No    Video Start Time: 4:36 PM    CHIEF COMPLAINT   It was my pleasure to see Grayson Nam who is a 59 year old male for follow-up of renal cell carcinoma.      HPI  Grayson Nam is a very pleasant 59 year old male who presents with a history of [id number] chromophobe right renal cell carcinoma, s/p laparoscopic nephrectomy 5/1/2019. Overall has done very well since surgery.     Cr 1.93.     PSA ruthann to 4.45 and 4.21 on recheck. PIRADS 2 on MRI 5/27/2022. Has returned to normal levels on recheck.  PSA 4.66     CT chest/abd/pelvis 5/23/2024  IMPRESSION:  1.  Right nephrectomy without evidence of recurrent or metastatic disease in the chest, abdomen, or pelvis.     PSA   5/23/2024 - 4.66  4/6/2023 - 3.67  9/8/2022 - 3.27 (21% free PSA)  4/27/2022 - 4.21  3/11/2022 - 4.45     MRI Prostate 5/27/2022  Size: 52 grams  IMPRESSION:  1. Based on the most suspicious abnormality, this exam is  characterized as PIRADS 2 - Clinically significant cancer is unlikely  to be present.  2. No suspicious adenopathy or evidence of pelvic metastases.         Allergies:   Lisinopril, Animal dander, Atorvastatin, Lidocaine, Novocain [procaine], and Ranitidine         Review of Systems:  From intake questionnaire     Skin: negative  Eyes: negative  Ears/Nose/Throat: negative  Respiratory: No shortness of breath, dyspnea on exertion, cough, or hemoptysis  Cardiovascular: No chest pain or palpitations  Gastrointestinal: negative; no nausea/vomiting, constipation or diarrhea  Genitourinary: as per HPI  Musculoskeletal: negative  Neurologic: negative  Psychiatric: negative  Hematologic/Lymphatic/Immunologic: negative  Endocrine: negative          Physical Exam:     Vitals:  No vitals were obtained today due to virtual visit.    Physical Exam   EYES: Eyes grossly normal to inspection.  No discharge or erythema, or obvious scleral/conjunctival abnormalities.  SKIN: Visible skin clear. No significant rash, abnormal pigmentation or lesions.  NEURO: Cranial nerves grossly intact.  Mentation and speech appropriate for age.  GENERAL: Healthy, alert and no distress  RESP: No audible wheeze, cough, or visible cyanosis.  No visible retractions or increased work of breathing.    PSYCH: Mentation appears normal, affect normal/bright, judgement and insight intact, normal speech and appearance well-groomed.      Outside and Past Medical records:    Review of the result(s) of each unique test - CT, creat, PSA         Assessment and Plan:     59 year old male with pT2a chromophobe RCC, doing well s/p R laparoscopic nephrectomy 5/1/2019. Labs and imaging today unremarkable with no evidence of recurrence. Given it has been 5 years since surgery, annual surveillance imaging is no longer indicated.     Regarding PSA, this has fluctuated some  and had PIRADS 2 on MRI. As such, will continue monitoring. Will recheck in 1 year with PCP.     - follow-up with me as needed  - PSA in 1 year with his PCP    Video-Visit Details    Type of service:  Video Visit    Video End Time:4:44 PM    Originating Location (pt. Location): Home    Distant Location (provider location):  On-site    Platform used for Video Visit: [employer]    8 minutes spent on the date of the encounter including direct interaction with the patient, performing chart review, history and exam, documentation and further activities as noted above.    Lino Esparza MD  Urology  [facility]

## 2024-05-30 NOTE — TELEPHONE ENCOUNTER
riboflavin 400 MG CAPS 90 capsule 3 4/13/2023       Last Office Visit: 10/16/23  Future Office visit:   none      Routing refill request to provider for review/approval because:  Drug not on the [facility], [facility] or [facility] refill protocol or controlled substance    Radha Benjamin RN  [facility] Red Flag Triage/MRT

## 2024-05-30 NOTE — NURSING NOTE
Is the patient currently in the state of [place]? YES    Visit mode:VIDEO    If the visit is dropped, the patient can be reconnected by: VIDEO VISIT: Text to cell phone:   Telephone Information:   Mobile [phone]       Will anyone else be joining the visit? NO  (If patient encounters technical issues they should call [phone])    How would you like to obtain your AVS? MyChart    Are changes needed to the allergy or medication list? No    Are refills needed on medications prescribed by this physician? NO    Reason for visit: RECHECK (RETURN PATIENT)    Yuliana BERMUDEZ

## 2024-05-31 RX ORDER — RIBOFLAVIN (VITAMIN B2) 400 MG
1 TABLET ORAL DAILY
Qty: 90 TABLET | Refills: 3 | Status: SHIPPED | OUTPATIENT
Start: 2024-05-31

## 2024-09-14 ENCOUNTER — HEALTH MAINTENANCE LETTER (OUTPATIENT)
Age: 59
End: 2024-09-14

## 2025-03-10 ENCOUNTER — TELEPHONE (OUTPATIENT)
Dept: OPHTHALMOLOGY | Facility: CLINIC | Age: 60
End: 2025-03-10
Payer: COMMERCIAL

## 2025-03-10 NOTE — TELEPHONE ENCOUNTER
Spoke with patient regarding scheduling for a sooner appointment from the wait-list. Rescheduled patient as offered and patient is aware of appointment details.-Per Patient

## 2025-03-11 NOTE — TELEPHONE ENCOUNTER
FUTURE VISIT INFORMATION      FUTURE VISIT INFORMATION:  Date: 4/28/25  Time: 10:20am  Location: [facility]  REFERRAL INFORMATION:  Reason for visit/diagnosis  CE    RECORDS REQUESTED FROM:       Clinic name Comments Records Status Imaging Status   [facility]  Request for recs sent 3/11  Fax: [phone]     Imaging MR Brain 5/12/23 [facility] pac

## 2025-04-15 ENCOUNTER — OFFICE VISIT (OUTPATIENT)
Dept: OPHTHALMOLOGY | Facility: CLINIC | Age: 60
End: 2025-04-15
Payer: COMMERCIAL

## 2025-04-15 DIAGNOSIS — H52.13 MYOPIC ASTIGMATISM OF BOTH EYES: ICD-10-CM

## 2025-04-15 DIAGNOSIS — H04.123 DRY EYES, BILATERAL: ICD-10-CM

## 2025-04-15 DIAGNOSIS — H52.4 PRESBYOPIA OF BOTH EYES: ICD-10-CM

## 2025-04-15 DIAGNOSIS — H04.223 EPIPHORA DUE TO INSUFFICIENT DRAINAGE OF BOTH SIDES: ICD-10-CM

## 2025-04-15 DIAGNOSIS — H25.813 MIXED TYPE AGE-RELATED CATARACT, BOTH EYES: Primary | ICD-10-CM

## 2025-04-15 DIAGNOSIS — D31.30 CHOROIDAL NEVUS, UNSPECIFIED LATERALITY: ICD-10-CM

## 2025-04-15 DIAGNOSIS — H52.203 MYOPIC ASTIGMATISM OF BOTH EYES: ICD-10-CM

## 2025-04-15 RX ORDER — OLOPATADINE HYDROCHLORIDE 1 MG/ML
1 SOLUTION/ DROPS OPHTHALMIC 2 TIMES DAILY
COMMUNITY

## 2025-04-15 RX ORDER — LOSARTAN POTASSIUM 100 MG/1
1 TABLET ORAL DAILY
COMMUNITY
Start: 2024-09-01

## 2025-04-15 ASSESSMENT — CONF VISUAL FIELD
OS_NORMAL: 1
OS_SUPERIOR_NASAL_RESTRICTION: 0
OS_INFERIOR_TEMPORAL_RESTRICTION: 0
OD_INFERIOR_NASAL_RESTRICTION: 0
OD_NORMAL: 1
OD_INFERIOR_TEMPORAL_RESTRICTION: 0
OS_SUPERIOR_TEMPORAL_RESTRICTION: 0
OS_INFERIOR_NASAL_RESTRICTION: 0
OD_SUPERIOR_TEMPORAL_RESTRICTION: 0
OD_SUPERIOR_NASAL_RESTRICTION: 0
METHOD: COUNTING FINGERS

## 2025-04-15 ASSESSMENT — VISUAL ACUITY
OS_SC: 20/25
CORRECTION_TYPE: GLASSES
OS_SC+: -1
METHOD: SNELLEN - LINEAR
OD_SC: 20/30
METHOD_MR: PT REQUESTS A REFRACTION
OD_SC+: +2

## 2025-04-15 ASSESSMENT — REFRACTION_WEARINGRX
OD_AXIS: 046
OD_CYLINDER: +1.00
OS_CYLINDER: +1.50
OS_ADD: +2.50
OD_SPHERE: +0.25
OS_SPHERE: +0.50
OS_AXIS: 135
OD_ADD: +2.50

## 2025-04-15 ASSESSMENT — REFRACTION_MANIFEST
OD_SPHERE: -1.25
OS_ADD: +2.50
OS_SPHERE: -0.50
OD_CYLINDER: +1.50
OS_AXIS: 135
OS_CYLINDER: +1.00
OD_ADD: +2.50
OD_AXIS: 040

## 2025-04-15 ASSESSMENT — CUP TO DISC RATIO
OD_RATIO: 0.3
OS_RATIO: 0.3

## 2025-04-15 ASSESSMENT — TONOMETRY
OS_IOP_MMHG: 16
IOP_METHOD: TONOPEN
OD_IOP_MMHG: 15

## 2025-04-15 ASSESSMENT — EXTERNAL EXAM - RIGHT EYE: OD_EXAM: NORMAL

## 2025-04-15 ASSESSMENT — EXTERNAL EXAM - LEFT EYE: OS_EXAM: NORMAL

## 2025-04-15 NOTE — PROGRESS NOTES
HPI  Grayson Nam is a 60 year old male here for comprehensive eye exam.    HPI       COMPREHENSIVE EYE EXAM    In both eyes.  Since onset it is stable.  Associated symptoms include glare, eye pain, redness, tearing, itching and swelling.  Treatments tried include eye drops.  Pain was noted as 0/10 (None today).             Comments    He states that for months his eyes have been excessively tearing and occasionally itching.  Using Pataday does help the itching but not the tearing.  He has a picture on his phone of his eye when it was really itchy and inflamed.    His vision seems stable in each eye, but night vision seems a bit worse.      CAIN Slater 1:46 PM  April 15, 2025               Last edited by Sarah Champagne COT on 4/15/2025  1:47 PM.             PMH:   Past Medical History:   Diagnosis Date    Adjustment disorder with anxiety     Cough variant asthma     Depressive disorder     Diverticulosis     Essential hypertension, benign     Hypertension, Benign    High cholesterol     History of blood transfusion     Kidney disease     CKD stage 3    Mixed hyperlipidemia     Proteinuria    Nasal lavage both eyes      POH: Glasses for myopia, cataracts, allergic conjunctivitis, no surgery, no trauma  Oc Meds: Pataday OU as needed  FH: Denies any glaucoma, age related macular degeneration, or other known eye diseases         Assessment & Plan     1. Mixed type age-related cataract, both eyes - Both Eyes    2. Epiphora due to insufficient drainage of both sides - Both Eyes    3. Dry eyes, bilateral - Both Eyes    4. Myopic astigmatism of both eyes - Both Eyes    5. Presbyopia of both eyes - Both Eyes    6. Choroidal nevus, unspecified laterality - Right Eye      Mixed cataract both eyes   comment: early, not visually significant, correctable to 20/20 each eye   counseled patient that it could cause mild glare/halos and refractive changes over time that can be compensated with new glasses   Plan: new  glasses prescription given, observe.  Call with vision changes    Epiphora due to insufficient drainage of both eyes   Dry eyes both eyes   Comment: Multifactorial epiphora   Minimal evaporative dry eye signs/corneal staining   Severely abnormal dye disappearance test both eyes   History of nasal and ocular allergies   Some punctal misalignment with conjunctival chalasis   Plan: Recommend warm compress at at bedtime and artificial tears, especially after using ocular antihistamine as this may be drying  Due to likely tear outflow problem recommend oculoplastics consult    Myopic astigmatism/presbyopia-both eyes  Comment: Small changes myopic shift  Plan: manifest refraction done and prescription for glasses given , update as needed    Choroidal nevus right eye  Comment: Small, no high risk features  Known to patient from previous  Plan: Recheck at annual exams   -----------------------------------------------------------------------------------       Patient disposition:   Return in about 18 months (around 10/15/2026) for Comprehensive Exam;  will call for oculoplastics next avail- epiphora consult ou  . or patient to call sooner as needed.      Complete documentation of historical and exam elements from today's encounter can be found in the full encounter summary report (not reduplicated in this progress note). I personally obtained the chief complaint(s) and history of present illness.  I have confirmed and edited as necessary the CC, HPI, PMH/PSH, social history, FMH, ROS, and exam/neuro findings as obtained by the technician or others. I have examined this patient myself and I personally viewed the image(s) and studies listed above and the documentation reflects my findings and interpretation.  I formulated and edited as necessary the assessment and plan and discussed the findings and management plan with the patient and family.     Jenny Alford MD

## 2025-04-15 NOTE — NURSING NOTE
Chief Complaints and History of Present Illnesses   Patient presents with    COMPREHENSIVE EYE EXAM     Chief Complaint(s) and History of Present Illness(es)       COMPREHENSIVE EYE EXAM              Laterality: both eyes    Course: stable    Associated symptoms: glare, eye pain, redness, tearing, itching and swelling    Treatments tried: eye drops    Pain scale: 0/10 (None today)              Comments    He states that for months his eyes have been excessively tearing and occasionally itching.  Using Pataday does help the itching but not the tearing.  He has a picture on his phone of his eye when it was really itchy and inflamed.    His vision seems stable in each eye, but night vision seems a bit worse.      Sarah Champagne, COT 1:46 PM  April 15, 2025

## 2025-04-28 ENCOUNTER — PRE VISIT (OUTPATIENT)
Dept: OPHTHALMOLOGY | Facility: CLINIC | Age: 60
End: 2025-04-28

## 2025-06-23 ENCOUNTER — OFFICE VISIT (OUTPATIENT)
Dept: OPHTHALMOLOGY | Facility: CLINIC | Age: 60
End: 2025-06-23
Payer: COMMERCIAL

## 2025-06-23 DIAGNOSIS — H02.135 SENILE ECTROPION OF BOTH LOWER EYELIDS: ICD-10-CM

## 2025-06-23 DIAGNOSIS — H04.223 EPIPHORA DUE TO INSUFFICIENT DRAINAGE OF BOTH SIDES: Primary | ICD-10-CM

## 2025-06-23 DIAGNOSIS — H02.132 SENILE ECTROPION OF BOTH LOWER EYELIDS: ICD-10-CM

## 2025-06-23 PROCEDURE — 99214 OFFICE O/P EST MOD 30 MIN: CPT | Mod: 25 | Performed by: OPHTHALMOLOGY

## 2025-06-23 PROCEDURE — 68801 DILATE TEAR DUCT OPENING: CPT | Mod: 50 | Performed by: OPHTHALMOLOGY

## 2025-06-23 ASSESSMENT — EXTERNAL EXAM - LEFT EYE: OS_EXAM: NORMAL

## 2025-06-23 ASSESSMENT — VISUAL ACUITY
OD_SC: 20/40
OS_SC+: -2
METHOD: SNELLEN - LINEAR
OS_SC: 20/30
OD_SC+: -1+1

## 2025-06-23 ASSESSMENT — TONOMETRY
IOP_METHOD: ICARE
OD_IOP_MMHG: 15
OS_IOP_MMHG: 15

## 2025-06-23 ASSESSMENT — EXTERNAL EXAM - RIGHT EYE: OD_EXAM: NORMAL

## 2025-06-23 NOTE — PROGRESS NOTES
Chief Complaints and History of Present Illnesses   Patient presents with    Consult For     Grayson Nam is being seen for a consult today by the request of Dr. Alford for Epiphora each eye.      Chief Complaint(s) and History of Present Illness(es)     Consult For    Associated symptoms include itching.  Negative for eye pain.  Treatments   tried include eye drops.  Pain was noted as 0/10. Additional comments:   Grayson Nam is being seen for a consult today by the request of   Dr. Alford for Epiphora each eye.     Comments    Pt states daily tearing each eye over the last 3 months.   Occasionally the tears will run down his face.  Intermittent itching each eye.      Ocular Meds: Pataday about 2x per week.    Hardik Ho 10:09 AM June 23, 2025        Assessment & Plan     Grayson Nam is a 60 year old male with the following diagnoses:   1. Epiphora due to insufficient drainage of both sides    2. Senile ectropion of both lower eyelids        Epiphora each eye about once weekly, for the last several months.    On irrigation of BLL, there is about 50% reflux each eye.   Nasal endoscopy wnl    Exam also shows BLL ectropion and laxity, likely contributing to epiphora as well.    Discussed options:  BLL ectropion repair with punctoplasty and Garza stent  2.   Bilateral external dacryocystorhinostomy     He would like to proceed with BLL ectropion repair with stent placement. He understands that he may need further NLD treatment if the epiphora fails to resolve following this surgery.    On ASA 81 mg daily. No pacemaker.          Suzan De Jesus MD  Oculoplastic Surgery Fellow    Attending Physician Attestation:  I have seen and examined this patient with the fellow .  I have confirmed and edited as necessary the chief complaint(s), history of present illness, review of systems, relevant history, and examination findings as documented by others.  I have personally reviewed the relevant tests,  images, and reports as documented above.  I have confirmed and edited as necessary the assessment and plan and agree with this note.    - Romero Feldman MD 10:52 AM 6/23/2025    Today with Grayson Nam, I reviewed the indications, risks, benefits, and alternatives of the proposed surgical procedure including, but not limited to, failure obtain the desired result  and need for additional surgery, bleeding, infection, loss of vision, loss of the eye, and the remote possibility of permanent damage to any organ system or death with the use of anesthesia.  I provided multiple opportunities for the questions, answered all questions to the best of my ability, and confirmed that my answers and my discussion were understood.     - Romero Feldman MD 10:52 AM 6/23/2025

## 2025-06-23 NOTE — LETTER
2025         RE:  :  MRN: Grayson Nam  1965  [id number]     Dear Jenny,    Thank you for asking me to see your patient, Grayson Nam, for an oculoplastic   consultation.  My assessment and plan are below.  For further details, please see my attached clinic note.      Assessment & Plan     Grayson Nam is a 60 year old male with the following diagnoses:   1. Epiphora due to insufficient drainage of both sides    2. Senile ectropion of both lower eyelids        Epiphora each eye about once weekly, for the last several months.    On irrigation of BLL, there is about 50% reflux each eye.   Nasal endoscopy wnl    Exam also shows BLL ectropion and laxity, likely contributing to epiphora as well.    Discussed options:  BLL ectropion repair with punctoplasty and Garza stent  2.   Bilateral external dacryocystorhinostomy     He would like to proceed with BLL ectropion repair with stent placement. He understands that he may need further NLD treatment if the epiphora fails to resolve following this surgery.    On ASA 81 mg daily. No pacemaker.         Again, thank you for allowing me to participate in the care of your patient.      Sincerely,    Romero Feldman MD  Department of Ophthalmology and Visual Neurosciences  [facility]    CC: Jenny Alford MD  [address] [facility] [address]  Via In Basket    Sadaf HUBER MD  [address]  [address] [address]  Via Fax: [phone]

## 2025-06-23 NOTE — NURSING NOTE
Chief Complaints and History of Present Illnesses   Patient presents with    Consult For     Grayson Hackettruddy is being seen for a consult today by the request of Dr. Alford for Epiphora each eye.      Chief Complaint(s) and History of Present Illness(es)       Consult For              Associated symptoms: itching.  Negative for eye pain    Treatments tried: eye drops    Pain scale: 0/10    Comments: Graysonjean-claude Nam is being seen for a consult today by the request of Dr. Alford for Epiphora each eye.               Comments    Pt states daily tearing each eye over the last 3 months.   Occasionally the tears will run down his face.  Intermittent itching each eye.      Ocular Meds: Pataday about 2x per week.    Hardik Currie 10:09 AM June 23, 2025

## 2025-06-24 ENCOUNTER — TELEPHONE (OUTPATIENT)
Dept: OPHTHALMOLOGY | Facility: CLINIC | Age: 60
End: 2025-06-24
Payer: COMMERCIAL

## 2025-06-24 NOTE — TELEPHONE ENCOUNTER
Spoke with patient, they were driving and advised the would call writer back once they were at home and able to discuss with spouse. Writer provided call back number of [phone]. Veronica Platt on 6/24/2025 at 9:46 AM

## 2025-06-25 PROBLEM — H02.135 SENILE ECTROPION OF BOTH LOWER EYELIDS: Status: ACTIVE | Noted: 2025-06-23

## 2025-06-25 PROBLEM — H02.132 SENILE ECTROPION OF BOTH LOWER EYELIDS: Status: ACTIVE | Noted: 2025-06-23

## 2025-06-25 PROBLEM — H04.223 EPIPHORA DUE TO INSUFFICIENT DRAINAGE OF BOTH SIDES: Status: ACTIVE | Noted: 2025-06-23

## 2025-06-25 NOTE — TELEPHONE ENCOUNTER
Called patient to schedule surgery with Dr Feldman    Spoke with: Grayson (patient)    Date(s) of Surgery: 10/8/25    Patient aware of approximate arrival time: Yes      Tissue: Not Applicable Ordered: Not Applicable     Location of surgery: [facility]     Pre-Op H&P: [facility]      Informed patient that they need to call to schedule pre-op H&P within 30 days of surgery date: Yes    Post-Op Appt Dates: 10/20/25 at 0845     Discussed with patient pre-op RN will call 2-3 days prior to surgery with arrival time and instructions:  Yes       Standard Surgery Packet Sent: Yes 06/25/25  via [employer]      Additional Information Sent in Packet:     Informed patient that they will need an adult  to bring patient home from surgery: Yes  : Glendy (spouse)         Additional Comments:        All patients questions were answered and was instructed to review surgical packet and call back [phone] with any questions or concerns.       Veronica Platt on 6/25/2025 at 9:54 AM

## 2025-07-03 DIAGNOSIS — R40.4 NONSPECIFIC PAROXYSMAL SPELL: ICD-10-CM

## 2025-07-03 RX ORDER — RIBOFLAVIN (VITAMIN B2) 400 MG
1 TABLET ORAL DAILY
Qty: 90 TABLET | OUTPATIENT
Start: 2025-07-03

## 2025-07-03 NOTE — TELEPHONE ENCOUNTER
Riboflavin 400 MG TABS   Last Written Prescription:  5/31/24  #90, 3 refills  ----------------------  Last Visit Date: 4/13/23  Future Visit Date: None  ----------------------  Refill decision:   [] Medication refilled per  Medication Refill in Ambulatory Care  policy.  [x] Medication unable to be refilled by RN due to: Pt not seen within past 12 months, No FOV or FOV exceeds timeframe per protocol        Request from pharmacy:  Requested Prescriptions   Pending Prescriptions Disp Refills    Riboflavin 400 MG TABS 90 tablet 3     Sig: Take 1 tablet by mouth daily.       There is no refill protocol information for this order

## (undated) DEVICE — DECANTER VIAL 2006S

## (undated) DEVICE — TUBING C02 INSUFFLATION LAP FILTER HEATER 6198

## (undated) DEVICE — ESU ENDO SCISSORS 5MM CVD 5DCS

## (undated) DEVICE — PREP CHLORAPREP 26ML TINTED ORANGE  260815

## (undated) DEVICE — ENDO POUCH UNIVERSAL RETRIEVAL SYSTEM INZII 12/15MM CD004

## (undated) DEVICE — PACK LAP CHOLE SLC15LCFSD

## (undated) DEVICE — STPL ENDO RELOAD 45MM VASCULAR MEDIUM TAN EGIA45AVM

## (undated) DEVICE — LINEN TOWEL PACK X5 5464

## (undated) DEVICE — GLOVE PROTEXIS MICRO 7.5  2D73PM75

## (undated) DEVICE — SU VICRYL 0 UR-6 27" J603H

## (undated) DEVICE — DRAPE IOBAN INCISE 23X17" 6650EZ

## (undated) DEVICE — DISSECTOR BALLOON OVL SPACEMAKER SMSBTOVL

## (undated) DEVICE — ESU HOLDER LAP INST DISP PURPLE LONG 330MM H-PRO-330

## (undated) DEVICE — SOL WATER IRRIG 1000ML BOTTLE 2F7114

## (undated) DEVICE — CATH TRAY FOLEY COUDE SURESTEP 16FR W/URNE MTR STLK A304716A

## (undated) DEVICE — EVAC SYSTEM CLEAR FLOW SC082500

## (undated) DEVICE — SURGICEL HEMOSTAT 3X4" NUKNIT 1943

## (undated) DEVICE — SUCTION CANISTER MEDIVAC LINER 3000ML W/LID 65651-530

## (undated) DEVICE — ENDO TROCAR FIRST ENTRY KII FIOS Z-THRD 12X100MM CTF73

## (undated) DEVICE — ENDO TROCAR SLEEVE KII Z-THREADED 12X100MM CTS22

## (undated) DEVICE — GLOVE PROTEXIS BLUE W/NEU-THERA 7.5  2D73EB75

## (undated) DEVICE — ESU GROUND PAD UNIVERSAL W/O CORD

## (undated) DEVICE — GLOVE PROTEXIS W/NEU-THERA 8.0  2D73TE80

## (undated) DEVICE — SYSTEM CLEARIFY VISUALIZATION 21-345

## (undated) DEVICE — CLIP ENDO HEMO-LOC PURPLE LG 544240

## (undated) DEVICE — ENDO TROCAR FIRST ENTRY KII FIOS Z-THRD 05X100MM CTF03

## (undated) DEVICE — SOL NACL 0.9% INJ 1000ML BAG 2B1324X

## (undated) DEVICE — SU VICRYL 0 CT-1 27" J340H

## (undated) DEVICE — SU MONOCRYL 4-0 PS-2 18" UND Y496G

## (undated) DEVICE — SUCTION IRR STRYKERFLOW II W/TIP 250-070-520

## (undated) DEVICE — ENDO TROCAR SLEEVE KII Z-THREADED 05X100MM CTS02

## (undated) DEVICE — ESU CORD MONOPOLAR 10'  E0510

## (undated) DEVICE — STPL ENDO HANDLE GIA ULTRA UNIVERSAL STD EGIAUSTND

## (undated) DEVICE — PREP DURAPREP 26ML APL 8630

## (undated) DEVICE — ESU PENCIL W/HOLSTER E2350H

## (undated) RX ORDER — VECURONIUM BROMIDE 1 MG/ML
INJECTION, POWDER, LYOPHILIZED, FOR SOLUTION INTRAVENOUS
Status: DISPENSED
Start: 2019-05-01

## (undated) RX ORDER — PROPOFOL 10 MG/ML
INJECTION, EMULSION INTRAVENOUS
Status: DISPENSED
Start: 2018-02-15

## (undated) RX ORDER — FENTANYL CITRATE 50 UG/ML
INJECTION, SOLUTION INTRAMUSCULAR; INTRAVENOUS
Status: DISPENSED
Start: 2018-02-15

## (undated) RX ORDER — ALBUTEROL SULFATE 90 UG/1
AEROSOL, METERED RESPIRATORY (INHALATION)
Status: DISPENSED
Start: 2019-05-01

## (undated) RX ORDER — LIDOCAINE HYDROCHLORIDE 20 MG/ML
INJECTION, SOLUTION EPIDURAL; INFILTRATION; INTRACAUDAL; PERINEURAL
Status: DISPENSED
Start: 2019-05-01

## (undated) RX ORDER — NEOSTIGMINE METHYLSULFATE 1 MG/ML
VIAL (ML) INJECTION
Status: DISPENSED
Start: 2019-05-01

## (undated) RX ORDER — GLYCOPYRROLATE 0.2 MG/ML
INJECTION, SOLUTION INTRAMUSCULAR; INTRAVENOUS
Status: DISPENSED
Start: 2019-05-01

## (undated) RX ORDER — HYDROCODONE BITARTRATE AND ACETAMINOPHEN 7.5; 325 MG/15ML; MG/15ML
SOLUTION ORAL
Status: DISPENSED
Start: 2018-02-15

## (undated) RX ORDER — PROPOFOL 10 MG/ML
INJECTION, EMULSION INTRAVENOUS
Status: DISPENSED
Start: 2019-05-01

## (undated) RX ORDER — HYDROMORPHONE HYDROCHLORIDE 1 MG/ML
INJECTION, SOLUTION INTRAMUSCULAR; INTRAVENOUS; SUBCUTANEOUS
Status: DISPENSED
Start: 2019-05-01

## (undated) RX ORDER — GABAPENTIN 300 MG/1
CAPSULE ORAL
Status: DISPENSED
Start: 2019-05-01

## (undated) RX ORDER — HYDROXYZINE HYDROCHLORIDE 25 MG/1
TABLET, FILM COATED ORAL
Status: DISPENSED
Start: 2019-05-01

## (undated) RX ORDER — ONDANSETRON 2 MG/ML
INJECTION INTRAMUSCULAR; INTRAVENOUS
Status: DISPENSED
Start: 2019-05-01

## (undated) RX ORDER — CEFAZOLIN SODIUM 2 G/100ML
INJECTION, SOLUTION INTRAVENOUS
Status: DISPENSED
Start: 2019-05-01

## (undated) RX ORDER — CEFAZOLIN SODIUM 1 G/3ML
INJECTION, POWDER, FOR SOLUTION INTRAMUSCULAR; INTRAVENOUS
Status: DISPENSED
Start: 2019-05-01

## (undated) RX ORDER — CEFAZOLIN SODIUM 2 G/100ML
INJECTION, SOLUTION INTRAVENOUS
Status: DISPENSED
Start: 2018-02-15

## (undated) RX ORDER — DEXAMETHASONE SODIUM PHOSPHATE 4 MG/ML
INJECTION, SOLUTION INTRA-ARTICULAR; INTRALESIONAL; INTRAMUSCULAR; INTRAVENOUS; SOFT TISSUE
Status: DISPENSED
Start: 2019-05-01

## (undated) RX ORDER — BUPIVACAINE HYDROCHLORIDE 5 MG/ML
INJECTION, SOLUTION EPIDURAL; INTRACAUDAL
Status: DISPENSED
Start: 2019-05-01

## (undated) RX ORDER — FENTANYL CITRATE 50 UG/ML
INJECTION, SOLUTION INTRAMUSCULAR; INTRAVENOUS
Status: DISPENSED
Start: 2019-05-01